# Patient Record
Sex: MALE | Race: WHITE | NOT HISPANIC OR LATINO
[De-identification: names, ages, dates, MRNs, and addresses within clinical notes are randomized per-mention and may not be internally consistent; named-entity substitution may affect disease eponyms.]

---

## 2017-01-05 ENCOUNTER — RX RENEWAL (OUTPATIENT)
Age: 72
End: 2017-01-05

## 2017-03-09 ENCOUNTER — APPOINTMENT (OUTPATIENT)
Dept: FAMILY MEDICINE | Facility: CLINIC | Age: 72
End: 2017-03-09

## 2017-03-09 VITALS
HEIGHT: 71 IN | SYSTOLIC BLOOD PRESSURE: 122 MMHG | BODY MASS INDEX: 27.23 KG/M2 | WEIGHT: 194.5 LBS | DIASTOLIC BLOOD PRESSURE: 70 MMHG | HEART RATE: 64 BPM

## 2017-03-09 VITALS — DIASTOLIC BLOOD PRESSURE: 80 MMHG | SYSTOLIC BLOOD PRESSURE: 120 MMHG | RESPIRATION RATE: 16 BRPM | HEART RATE: 72 BPM

## 2017-06-29 ENCOUNTER — APPOINTMENT (OUTPATIENT)
Dept: FAMILY MEDICINE | Facility: CLINIC | Age: 72
End: 2017-06-29

## 2017-06-29 VITALS
RESPIRATION RATE: 16 BRPM | BODY MASS INDEX: 26.2 KG/M2 | WEIGHT: 187.13 LBS | SYSTOLIC BLOOD PRESSURE: 110 MMHG | OXYGEN SATURATION: 97 % | HEIGHT: 71 IN | DIASTOLIC BLOOD PRESSURE: 66 MMHG | HEART RATE: 68 BPM

## 2017-06-29 VITALS — RESPIRATION RATE: 16 BRPM | HEART RATE: 64 BPM | DIASTOLIC BLOOD PRESSURE: 90 MMHG | SYSTOLIC BLOOD PRESSURE: 140 MMHG

## 2017-06-29 DIAGNOSIS — Z71.89 OTHER SPECIFIED COUNSELING: ICD-10-CM

## 2017-06-29 DIAGNOSIS — Z12.11 ENCOUNTER FOR SCREENING FOR MALIGNANT NEOPLASM OF COLON: ICD-10-CM

## 2017-06-29 DIAGNOSIS — J18.9 PNEUMONIA, UNSPECIFIED ORGANISM: ICD-10-CM

## 2017-06-29 RX ORDER — CEPHALEXIN 500 MG/1
500 TABLET ORAL
Qty: 14 | Refills: 0 | Status: COMPLETED | COMMUNITY
Start: 2017-04-12

## 2017-06-29 RX ORDER — AMOXICILLIN 875 MG/1
875 TABLET, FILM COATED ORAL
Qty: 20 | Refills: 0 | Status: COMPLETED | COMMUNITY
Start: 2017-03-09 | End: 2017-06-29

## 2017-07-11 ENCOUNTER — APPOINTMENT (OUTPATIENT)
Dept: ORTHOPEDIC SURGERY | Facility: CLINIC | Age: 72
End: 2017-07-11

## 2017-07-11 VITALS — HEART RATE: 68 BPM | DIASTOLIC BLOOD PRESSURE: 82 MMHG | SYSTOLIC BLOOD PRESSURE: 135 MMHG

## 2017-07-11 VITALS — WEIGHT: 187 LBS | BODY MASS INDEX: 26.18 KG/M2 | HEIGHT: 71 IN

## 2017-07-11 DIAGNOSIS — M47.24 OTHER SPONDYLOSIS WITH RADICULOPATHY, THORACIC REGION: ICD-10-CM

## 2017-07-11 DIAGNOSIS — M54.14 RADICULOPATHY, THORACIC REGION: ICD-10-CM

## 2017-07-11 DIAGNOSIS — S23.9XXA SPRAIN OF UNSPECIFIED PARTS OF THORAX, INITIAL ENCOUNTER: ICD-10-CM

## 2017-10-03 ENCOUNTER — MED ADMIN CHARGE (OUTPATIENT)
Age: 72
End: 2017-10-03

## 2017-10-03 ENCOUNTER — APPOINTMENT (OUTPATIENT)
Dept: FAMILY MEDICINE | Facility: CLINIC | Age: 72
End: 2017-10-03
Payer: MEDICARE

## 2017-10-03 PROCEDURE — 90662 IIV NO PRSV INCREASED AG IM: CPT

## 2017-10-03 PROCEDURE — G0008: CPT

## 2017-12-06 ENCOUNTER — MED ADMIN CHARGE (OUTPATIENT)
Age: 72
End: 2017-12-06

## 2017-12-06 ENCOUNTER — APPOINTMENT (OUTPATIENT)
Dept: FAMILY MEDICINE | Facility: CLINIC | Age: 72
End: 2017-12-06
Payer: MEDICARE

## 2017-12-06 PROCEDURE — 90670 PCV13 VACCINE IM: CPT

## 2017-12-06 PROCEDURE — G0009: CPT

## 2018-01-16 ENCOUNTER — LABORATORY RESULT (OUTPATIENT)
Age: 73
End: 2018-01-16

## 2018-01-31 ENCOUNTER — APPOINTMENT (OUTPATIENT)
Dept: FAMILY MEDICINE | Facility: CLINIC | Age: 73
End: 2018-01-31
Payer: MEDICARE

## 2018-01-31 VITALS
HEIGHT: 71 IN | TEMPERATURE: 97.6 F | BODY MASS INDEX: 27.21 KG/M2 | DIASTOLIC BLOOD PRESSURE: 62 MMHG | WEIGHT: 194.38 LBS | SYSTOLIC BLOOD PRESSURE: 112 MMHG

## 2018-01-31 VITALS — RESPIRATION RATE: 16 BRPM | SYSTOLIC BLOOD PRESSURE: 90 MMHG | HEART RATE: 72 BPM | DIASTOLIC BLOOD PRESSURE: 60 MMHG

## 2018-01-31 PROCEDURE — 99213 OFFICE O/P EST LOW 20 MIN: CPT

## 2018-01-31 RX ORDER — TOBRAMYCIN AND DEXAMETHASONE 3; 1 MG/ML; MG/ML
0.3-0.1 SUSPENSION/ DROPS OPHTHALMIC
Qty: 5 | Refills: 0 | Status: COMPLETED | COMMUNITY
Start: 2018-01-02

## 2018-01-31 RX ORDER — DILTIAZEM HCL 120 MG
CAPSULE, EXT RELEASE 24 HR ORAL
Refills: 0 | Status: COMPLETED | COMMUNITY

## 2018-01-31 RX ORDER — POLYMYXIN B SULFATE AND TRIMETHOPRIM 10000; 1 [USP'U]/ML; MG/ML
10000-0.1 SOLUTION OPHTHALMIC
Qty: 10 | Refills: 0 | Status: COMPLETED | COMMUNITY
Start: 2018-01-17

## 2018-01-31 RX ORDER — ERYTHROMYCIN 5 MG/G
5 OINTMENT OPHTHALMIC
Qty: 4 | Refills: 0 | Status: COMPLETED | COMMUNITY
Start: 2018-01-17

## 2018-01-31 RX ORDER — NAPROXEN 500 MG/1
500 TABLET ORAL
Qty: 60 | Refills: 1 | Status: COMPLETED | COMMUNITY
Start: 2017-03-09 | End: 2018-01-31

## 2018-01-31 RX ORDER — METOPROLOL SUCCINATE 200 MG/1
TABLET, EXTENDED RELEASE ORAL
Refills: 0 | Status: COMPLETED | COMMUNITY

## 2018-02-21 ENCOUNTER — APPOINTMENT (OUTPATIENT)
Dept: FAMILY MEDICINE | Facility: CLINIC | Age: 73
End: 2018-02-21
Payer: MEDICARE

## 2018-02-21 VITALS
DIASTOLIC BLOOD PRESSURE: 62 MMHG | WEIGHT: 194.13 LBS | BODY MASS INDEX: 27.18 KG/M2 | HEIGHT: 71 IN | SYSTOLIC BLOOD PRESSURE: 126 MMHG

## 2018-02-21 VITALS — RESPIRATION RATE: 16 BRPM | DIASTOLIC BLOOD PRESSURE: 74 MMHG | SYSTOLIC BLOOD PRESSURE: 120 MMHG | HEART RATE: 72 BPM

## 2018-02-21 DIAGNOSIS — J06.9 ACUTE UPPER RESPIRATORY INFECTION, UNSPECIFIED: ICD-10-CM

## 2018-02-21 DIAGNOSIS — Q38.3 OTHER CONGENITAL MALFORMATIONS OF TONGUE: ICD-10-CM

## 2018-02-21 PROCEDURE — 99214 OFFICE O/P EST MOD 30 MIN: CPT

## 2018-02-21 RX ORDER — AMOXICILLIN 250 MG/1
250 CAPSULE ORAL
Qty: 28 | Refills: 0 | Status: COMPLETED | COMMUNITY
Start: 2018-02-20 | End: 2018-02-21

## 2018-02-21 RX ORDER — METHYLPREDNISOLONE 4 MG/1
4 TABLET ORAL
Qty: 21 | Refills: 0 | Status: COMPLETED | COMMUNITY
Start: 2018-02-20

## 2018-02-21 RX ORDER — SOD CHLOR,BICARB/SQUEEZ BOTTLE
PACKET, WITH RINSE DEVICE NASAL
Qty: 1 | Refills: 0 | Status: COMPLETED | COMMUNITY
Start: 2018-01-31 | End: 2018-02-21

## 2018-03-17 ENCOUNTER — APPOINTMENT (OUTPATIENT)
Dept: FAMILY MEDICINE | Facility: CLINIC | Age: 73
End: 2018-03-17
Payer: MEDICARE

## 2018-03-17 VITALS
WEIGHT: 194 LBS | HEIGHT: 71 IN | DIASTOLIC BLOOD PRESSURE: 70 MMHG | HEART RATE: 96 BPM | OXYGEN SATURATION: 96 % | TEMPERATURE: 99 F | BODY MASS INDEX: 27.16 KG/M2 | SYSTOLIC BLOOD PRESSURE: 110 MMHG

## 2018-03-17 VITALS — RESPIRATION RATE: 16 BRPM | HEART RATE: 76 BPM | SYSTOLIC BLOOD PRESSURE: 110 MMHG | DIASTOLIC BLOOD PRESSURE: 70 MMHG

## 2018-03-17 LAB
BILIRUB UR QL STRIP: NEGATIVE
CLARITY UR: CLEAR
COLLECTION METHOD: NORMAL
GLUCOSE UR-MCNC: NEGATIVE
HCG UR QL: 0.2 EU/DL
HGB UR QL STRIP.AUTO: NORMAL
KETONES UR-MCNC: NEGATIVE
LEUKOCYTE ESTERASE UR QL STRIP: NORMAL
NITRITE UR QL STRIP: NEGATIVE
PH UR STRIP: 6
PROT UR STRIP-MCNC: 100
SP GR UR STRIP: 1.02

## 2018-03-17 PROCEDURE — 99214 OFFICE O/P EST MOD 30 MIN: CPT | Mod: 25

## 2018-03-17 PROCEDURE — 81003 URINALYSIS AUTO W/O SCOPE: CPT | Mod: QW

## 2018-10-11 ENCOUNTER — APPOINTMENT (OUTPATIENT)
Dept: FAMILY MEDICINE | Facility: CLINIC | Age: 73
End: 2018-10-11
Payer: MEDICARE

## 2018-10-11 VITALS
RESPIRATION RATE: 16 BRPM | HEART RATE: 68 BPM | WEIGHT: 194 LBS | OXYGEN SATURATION: 96 % | HEIGHT: 71 IN | BODY MASS INDEX: 27.16 KG/M2 | DIASTOLIC BLOOD PRESSURE: 60 MMHG | SYSTOLIC BLOOD PRESSURE: 90 MMHG | TEMPERATURE: 98.4 F

## 2018-10-11 DIAGNOSIS — R82.71 BACTERIURIA: ICD-10-CM

## 2018-10-11 LAB
BILIRUB UR QL STRIP: NEGATIVE
GLUCOSE UR-MCNC: NEGATIVE
HCG UR QL: 0.2 EU/DL
HGB UR QL STRIP.AUTO: NORMAL
KETONES UR-MCNC: NEGATIVE
LEUKOCYTE ESTERASE UR QL STRIP: NORMAL
NITRITE UR QL STRIP: NEGATIVE
PH UR STRIP: 6
PROT UR STRIP-MCNC: NEGATIVE
SP GR UR STRIP: 1.02

## 2018-10-11 PROCEDURE — 81003 URINALYSIS AUTO W/O SCOPE: CPT | Mod: QW

## 2018-10-11 PROCEDURE — 99214 OFFICE O/P EST MOD 30 MIN: CPT

## 2018-10-11 RX ORDER — MOMETASONE FUROATE 1 MG/G
0.1 CREAM TOPICAL
Qty: 15 | Refills: 0 | Status: COMPLETED | COMMUNITY
Start: 2018-06-21

## 2018-10-11 RX ORDER — DESONIDE 0.5 MG/G
0.05 CREAM TOPICAL
Qty: 30 | Refills: 0 | Status: COMPLETED | COMMUNITY
Start: 2018-09-28

## 2018-10-11 RX ORDER — CEPHALEXIN 500 MG/1
500 CAPSULE ORAL
Qty: 20 | Refills: 0 | Status: COMPLETED | COMMUNITY
Start: 2018-10-03

## 2018-10-11 NOTE — HISTORY OF PRESENT ILLNESS
[FreeTextEntry8] : recurrent  uti  \par \par has  seen multiple  md  for recurrent  utihas  had  multiple  opinions has  had  bacteriuria  but not having  any sx  of  dysuria going  for  ct  scan of abd and pelvis

## 2018-10-23 ENCOUNTER — APPOINTMENT (OUTPATIENT)
Dept: FAMILY MEDICINE | Facility: CLINIC | Age: 73
End: 2018-10-23

## 2018-10-25 ENCOUNTER — APPOINTMENT (OUTPATIENT)
Dept: FAMILY MEDICINE | Facility: CLINIC | Age: 73
End: 2018-10-25
Payer: MEDICARE

## 2018-10-25 VITALS — TEMPERATURE: 98.2 F

## 2018-10-25 DIAGNOSIS — Z23 ENCOUNTER FOR IMMUNIZATION: ICD-10-CM

## 2018-10-25 PROCEDURE — 90662 IIV NO PRSV INCREASED AG IM: CPT

## 2018-10-25 PROCEDURE — G0008: CPT

## 2018-10-31 ENCOUNTER — APPOINTMENT (OUTPATIENT)
Dept: FAMILY MEDICINE | Facility: CLINIC | Age: 73
End: 2018-10-31

## 2019-02-11 ENCOUNTER — NON-APPOINTMENT (OUTPATIENT)
Age: 74
End: 2019-02-11

## 2019-02-11 ENCOUNTER — APPOINTMENT (OUTPATIENT)
Dept: FAMILY MEDICINE | Facility: CLINIC | Age: 74
End: 2019-02-11
Payer: MEDICARE

## 2019-02-11 VITALS
OXYGEN SATURATION: 97 % | SYSTOLIC BLOOD PRESSURE: 136 MMHG | HEART RATE: 65 BPM | RESPIRATION RATE: 16 BRPM | WEIGHT: 193 LBS | BODY MASS INDEX: 27.02 KG/M2 | TEMPERATURE: 97.7 F | HEIGHT: 71 IN | DIASTOLIC BLOOD PRESSURE: 86 MMHG

## 2019-02-11 VITALS — HEART RATE: 72 BPM | DIASTOLIC BLOOD PRESSURE: 90 MMHG | SYSTOLIC BLOOD PRESSURE: 140 MMHG | RESPIRATION RATE: 16 BRPM

## 2019-02-11 PROCEDURE — 93000 ELECTROCARDIOGRAM COMPLETE: CPT

## 2019-02-11 PROCEDURE — 99214 OFFICE O/P EST MOD 30 MIN: CPT | Mod: 25

## 2019-02-11 RX ORDER — APIXABAN 5 MG/1
5 TABLET, FILM COATED ORAL
Qty: 4 | Refills: 0 | Status: COMPLETED | COMMUNITY
Start: 2018-05-23 | End: 2019-02-11

## 2019-02-11 RX ORDER — NAPROXEN 500 MG/1
500 TABLET ORAL
Qty: 60 | Refills: 1 | Status: COMPLETED | COMMUNITY
Start: 2017-07-11 | End: 2019-02-11

## 2019-02-11 RX ORDER — LEVOFLOXACIN 500 MG/1
500 TABLET, FILM COATED ORAL DAILY
Qty: 10 | Refills: 0 | Status: COMPLETED | COMMUNITY
Start: 2018-03-17 | End: 2019-02-11

## 2019-02-11 NOTE — ASSESSMENT
[High Risk Surgery - Intraperitoneal, Intrathoracic or Supringuinal Vascular Procedures] : High Risk Surgery - Intraperitoneal, Intrathoracic or Supringuinal Vascular Procedures - No (0) [Ischemic Heart Disease] : Ischemic Heart Disease - No (0) [Congestive Heart Failure] : Congestive Heart Failure - No (0) [Prior Cerebrovascular Accident or TIA] : Prior Cerebrovascular Accident or TIA - No (0) [Creatinine >= 2mg/dL (1 Point)] : Creatinine >= 2mg/dL - No (0) [Insulin-dependent Diabetic (1 Point)] : Insulin-dependent Diabetic - No (0) [0] : 0 , RCRI Class: I, Risk of Post-Op Cardiac Complications: 0.4%, Procedure Risk: Low-Risk [Patient Optimized for Surgery] : Patient optimized for surgery [No Further Testing Recommended] : no further testing recommended

## 2019-02-11 NOTE — HISTORY OF PRESENT ILLNESS
[No Pertinent Cardiac History] : no history of aortic stenosis, atrial fibrillation, coronary artery disease, recent myocardial infarction, or implantable device/pacemaker [No Pertinent Pulmonary History] : no history of asthma, COPD, sleep apnea, or smoking [No Adverse Anesthesia Reaction] : no adverse anesthesia reaction in self or family member [(Patient denies any chest pain, claudication, dyspnea on exertion, orthopnea, palpitations or syncope)] : Patient denies any chest pain, claudication, dyspnea on exertion, orthopnea, palpitations or syncope [Moderate (4-6 METs)] : Moderate (4-6 METs) [Chronic Anticoagulation] : no chronic anticoagulation [Chronic Kidney Disease] : no chronic kidney disease [Diabetes] : no diabetes [FreeTextEntry1] : Cataract  [FreeTextEntry2] : 2//25/19  [FreeTextEntry3] : Dr. Edouard [FreeTextEntry4] : pt is a 73 year old  male here for cataract extraction

## 2019-02-11 NOTE — REVIEW OF SYSTEMS
[Fever] : no fever [Chills] : no chills [Discharge] : no discharge [Vision Problems] : vision problems [Sore Throat] : no sore throat [Chest Pain] : no chest pain [Palpitations] : no palpitations [Shortness Of Breath] : no shortness of breath [Cough] : no cough [Abdominal Pain] : no abdominal pain [Constipation] : constipation [Diarrhea] : no diarrhea [Dysuria] : no dysuria [Skin Rash] : no skin rash [Dizziness] : no dizziness [Fainting] : no fainting [Swollen Glands] : no swollen glands

## 2019-02-11 NOTE — CONSULT LETTER
[Dear  ___] : Dear  [unfilled], [( Thank you for referring [unfilled] for consultation for _____ )] : Thank you for referring [unfilled] for consultation for [unfilled] [Consult Closing:] : Thank you very much for allowing me to participate in the care of this patient.  If you have any questions, please do not hesitate to contact me.

## 2019-03-06 ENCOUNTER — APPOINTMENT (OUTPATIENT)
Dept: FAMILY MEDICINE | Facility: CLINIC | Age: 74
End: 2019-03-06
Payer: MEDICARE

## 2019-03-06 VITALS
RESPIRATION RATE: 16 BRPM | OXYGEN SATURATION: 98 % | SYSTOLIC BLOOD PRESSURE: 124 MMHG | DIASTOLIC BLOOD PRESSURE: 70 MMHG | HEART RATE: 59 BPM | TEMPERATURE: 97.5 F | HEIGHT: 71 IN | WEIGHT: 190 LBS | BODY MASS INDEX: 26.6 KG/M2

## 2019-03-06 VITALS — HEART RATE: 72 BPM | RESPIRATION RATE: 16 BRPM | DIASTOLIC BLOOD PRESSURE: 90 MMHG | SYSTOLIC BLOOD PRESSURE: 140 MMHG

## 2019-03-06 DIAGNOSIS — H26.9 UNSPECIFIED CATARACT: ICD-10-CM

## 2019-03-06 PROCEDURE — 99214 OFFICE O/P EST MOD 30 MIN: CPT

## 2019-03-06 RX ORDER — CIPROFLOXACIN HYDROCHLORIDE 500 MG/1
500 TABLET, FILM COATED ORAL TWICE DAILY
Qty: 20 | Refills: 0 | Status: COMPLETED | COMMUNITY
Start: 2019-02-11 | End: 2019-03-06

## 2019-03-06 NOTE — HISTORY OF PRESENT ILLNESS
[Atrial Fibrillation] : atrial fibrillation [No Pertinent Pulmonary History] : no history of asthma, COPD, sleep apnea, or smoking [No Adverse Anesthesia Reaction] : no adverse anesthesia reaction in self or family member [(Patient denies any chest pain, claudication, dyspnea on exertion, orthopnea, palpitations or syncope)] : Patient denies any chest pain, claudication, dyspnea on exertion, orthopnea, palpitations or syncope [Moderate (4-6 METs)] : Moderate (4-6 METs) [Chronic Anticoagulation] : no chronic anticoagulation [Chronic Kidney Disease] : no chronic kidney disease [Diabetes] : no diabetes [FreeTextEntry1] : cataract OD [FreeTextEntry2] : 3/19/19 [FreeTextEntry3] : Dr. Edouard  [FreeTextEntry4] : pt here  for  clearance for cataract  surgery  active  medical problems  include AF AND HLD

## 2019-03-06 NOTE — ASSESSMENT
[High Risk Surgery - Intraperitoneal, Intrathoracic or Supringuinal Vascular Procedures] : High Risk Surgery - Intraperitoneal, Intrathoracic or Supringuinal Vascular Procedures - No (0) [Ischemic Heart Disease] : Ischemic Heart Disease - No (0) [Congestive Heart Failure] : Congestive Heart Failure - No (0) [Prior Cerebrovascular Accident or TIA] : Prior Cerebrovascular Accident or TIA - No (0) [Creatinine >= 2mg/dL (1 Point)] : Creatinine >= 2mg/dL - No (0) [Insulin-dependent Diabetic (1 Point)] : Insulin-dependent Diabetic - No (0) [0] : 0 , RCRI Class: I, Risk of Post-Op Cardiac Complications: 0.4%, Procedure Risk: Low-Risk [Patient Optimized for Surgery] : Patient optimized for surgery [FreeTextEntry4] : acceptable medical condition for surgery\par

## 2019-03-06 NOTE — REVIEW OF SYSTEMS
[Vision Problems] : vision problems [Constipation] : constipation [Fever] : no fever [Chills] : no chills [Discharge] : no discharge [Sore Throat] : no sore throat [Chest Pain] : no chest pain [Palpitations] : no palpitations [Shortness Of Breath] : no shortness of breath [Cough] : no cough [Abdominal Pain] : no abdominal pain [Diarrhea] : no diarrhea [Dysuria] : no dysuria [Skin Rash] : no skin rash [Dizziness] : no dizziness [Fainting] : no fainting [Swollen Glands] : no swollen glands

## 2019-03-09 ENCOUNTER — RX RENEWAL (OUTPATIENT)
Age: 74
End: 2019-03-09

## 2019-03-15 ENCOUNTER — APPOINTMENT (OUTPATIENT)
Dept: ORTHOPEDIC SURGERY | Facility: CLINIC | Age: 74
End: 2019-03-15

## 2019-08-23 ENCOUNTER — RX RENEWAL (OUTPATIENT)
Age: 74
End: 2019-08-23

## 2019-09-19 ENCOUNTER — APPOINTMENT (OUTPATIENT)
Dept: UROLOGY | Facility: CLINIC | Age: 74
End: 2019-09-19
Payer: MEDICARE

## 2019-09-19 PROCEDURE — 99204 OFFICE O/P NEW MOD 45 MIN: CPT | Mod: 25

## 2019-09-19 PROCEDURE — 51798 US URINE CAPACITY MEASURE: CPT

## 2019-09-20 LAB
APPEARANCE: CLEAR
BACTERIA: ABNORMAL
BILIRUBIN URINE: NEGATIVE
BLOOD URINE: ABNORMAL
COLOR: NORMAL
GLUCOSE QUALITATIVE U: NEGATIVE
HYALINE CASTS: 1 /LPF
KETONES URINE: NEGATIVE
LEUKOCYTE ESTERASE URINE: NEGATIVE
MICROSCOPIC-UA: NORMAL
NITRITE URINE: NEGATIVE
PH URINE: 7.5
PROTEIN URINE: ABNORMAL
PSA FREE FLD-MCNC: NORMAL %
PSA FREE SERPL-MCNC: <0.01 NG/ML
PSA SERPL-MCNC: <0.01 NG/ML
RED BLOOD CELLS URINE: 10 /HPF
SPECIFIC GRAVITY URINE: 1.01
SQUAMOUS EPITHELIAL CELLS: 1 /HPF
UROBILINOGEN URINE: NORMAL
WHITE BLOOD CELLS URINE: 16 /HPF

## 2019-10-03 ENCOUNTER — APPOINTMENT (OUTPATIENT)
Dept: FAMILY MEDICINE | Facility: CLINIC | Age: 74
End: 2019-10-03
Payer: MEDICARE

## 2019-10-03 VITALS
OXYGEN SATURATION: 97 % | WEIGHT: 186.13 LBS | TEMPERATURE: 98 F | HEIGHT: 71 IN | SYSTOLIC BLOOD PRESSURE: 128 MMHG | BODY MASS INDEX: 26.06 KG/M2 | DIASTOLIC BLOOD PRESSURE: 80 MMHG | HEART RATE: 68 BPM

## 2019-10-03 VITALS — SYSTOLIC BLOOD PRESSURE: 110 MMHG | DIASTOLIC BLOOD PRESSURE: 70 MMHG | HEART RATE: 72 BPM | RESPIRATION RATE: 16 BRPM

## 2019-10-03 PROCEDURE — G0008: CPT

## 2019-10-03 PROCEDURE — G0444 DEPRESSION SCREEN ANNUAL: CPT | Mod: 59

## 2019-10-03 PROCEDURE — 99214 OFFICE O/P EST MOD 30 MIN: CPT | Mod: 25

## 2019-10-03 PROCEDURE — 90662 IIV NO PRSV INCREASED AG IM: CPT

## 2019-10-03 NOTE — HISTORY OF PRESENT ILLNESS
[Hyperlipidemia] : Hyperlipidemia [Other: ___] : [unfilled] [Mild] : mild [Congestion] : congestion [Cough] : cough [Improving] : improving [Doing Well] : Patient is doing well [Moderate Intensity Therapy] : Patient is currently on moderate intensity statin  therapy [Sore Throat] : no sore throat [Fever] : no fever [FreeTextEntry6] : had  episode of  AF  1 WK AGO LASTED SEVERAL  HRS  SAW  CARDIOLOGY AND  HAD  CARDIOVERSION NOW ON ELIQUES  FOR  4 WKS

## 2019-10-03 NOTE — REVIEW OF SYSTEMS
[Palpitations] : palpitations [Anxiety] : anxiety [Fever] : no fever [Fatigue] : no fatigue [de-identified] : WORRIED  ABOUT  SON  WITH CANCER

## 2019-10-03 NOTE — PHYSICAL EXAM
[No Acute Distress] : no acute distress [PERRL] : pupils equal round and reactive to light [Normal Oropharynx] : the oropharynx was normal [No Lymphadenopathy] : no lymphadenopathy [No Accessory Muscle Use] : no accessory muscle use [Regular Rhythm] : with a regular rhythm [No Edema] : there was no peripheral edema [No Rash] : no rash [Normal] : normal gait, coordination grossly intact, no focal deficits and deep tendon reflexes were 2+ and symmetric [Normal Insight/Judgement] : insight and judgment were intact

## 2020-02-04 LAB
ALBUMIN SERPL ELPH-MCNC: 4.3 G/DL
ALP BLD-CCNC: 80 U/L
ALT SERPL-CCNC: 40 U/L
ANION GAP SERPL CALC-SCNC: 10 MMOL/L
AST SERPL-CCNC: 25 U/L
BASOPHILS # BLD AUTO: 0.07 K/UL
BASOPHILS NFR BLD AUTO: 1.5 %
BILIRUB SERPL-MCNC: 0.4 MG/DL
BUN SERPL-MCNC: 27 MG/DL
CALCIUM SERPL-MCNC: 10.1 MG/DL
CHLORIDE SERPL-SCNC: 106 MMOL/L
CHOLEST SERPL-MCNC: 180 MG/DL
CHOLEST/HDLC SERPL: 4.1 RATIO
CO2 SERPL-SCNC: 26 MMOL/L
CREAT SERPL-MCNC: 1.32 MG/DL
EOSINOPHIL # BLD AUTO: 0.13 K/UL
EOSINOPHIL NFR BLD AUTO: 2.8 %
GLUCOSE SERPL-MCNC: 104 MG/DL
HCT VFR BLD CALC: 51 %
HDLC SERPL-MCNC: 43 MG/DL
HGB BLD-MCNC: 16.6 G/DL
IMM GRANULOCYTES NFR BLD AUTO: 0.2 %
LDLC SERPL CALC-MCNC: 116 MG/DL
LYMPHOCYTES # BLD AUTO: 1.01 K/UL
LYMPHOCYTES NFR BLD AUTO: 21.9 %
MAN DIFF?: NORMAL
MCHC RBC-ENTMCNC: 31.6 PG
MCHC RBC-ENTMCNC: 32.5 GM/DL
MCV RBC AUTO: 97.1 FL
MONOCYTES # BLD AUTO: 0.6 K/UL
MONOCYTES NFR BLD AUTO: 13 %
NEUTROPHILS # BLD AUTO: 2.79 K/UL
NEUTROPHILS NFR BLD AUTO: 60.6 %
PLATELET # BLD AUTO: 278 K/UL
POTASSIUM SERPL-SCNC: 4.7 MMOL/L
PROT SERPL-MCNC: 6.6 G/DL
RBC # BLD: 5.25 M/UL
RBC # FLD: 14.1 %
SODIUM SERPL-SCNC: 142 MMOL/L
T4 SERPL-MCNC: 6.2 UG/DL
TRIGL SERPL-MCNC: 98 MG/DL
TSH SERPL-ACNC: 1.45 UIU/ML
URATE SERPL-MCNC: 6.4 MG/DL
WBC # FLD AUTO: 4.61 K/UL

## 2020-02-10 ENCOUNTER — NON-APPOINTMENT (OUTPATIENT)
Age: 75
End: 2020-02-10

## 2020-02-10 ENCOUNTER — APPOINTMENT (OUTPATIENT)
Dept: FAMILY MEDICINE | Facility: CLINIC | Age: 75
End: 2020-02-10
Payer: MEDICARE

## 2020-02-10 VITALS
OXYGEN SATURATION: 98 % | RESPIRATION RATE: 16 BRPM | WEIGHT: 182 LBS | SYSTOLIC BLOOD PRESSURE: 134 MMHG | BODY MASS INDEX: 25.48 KG/M2 | HEIGHT: 71 IN | DIASTOLIC BLOOD PRESSURE: 80 MMHG | HEART RATE: 62 BPM

## 2020-02-10 VITALS — SYSTOLIC BLOOD PRESSURE: 120 MMHG | HEART RATE: 60 BPM | RESPIRATION RATE: 16 BRPM | DIASTOLIC BLOOD PRESSURE: 70 MMHG

## 2020-02-10 PROCEDURE — 93000 ELECTROCARDIOGRAM COMPLETE: CPT | Mod: 59

## 2020-02-10 PROCEDURE — G0439: CPT

## 2020-02-10 PROCEDURE — 99213 OFFICE O/P EST LOW 20 MIN: CPT | Mod: 25

## 2020-02-10 NOTE — HISTORY OF PRESENT ILLNESS
[FreeTextEntry1] : pt  here for cpe and management of HLD AF  [de-identified] : PT  SON  KIT RECOVERING FROM TESTICULAR  CANCER

## 2020-02-10 NOTE — PHYSICAL EXAM
[No Acute Distress] : no acute distress [Well Nourished] : well nourished [Well Developed] : well developed [Normal Sclera/Conjunctiva] : normal sclera/conjunctiva [Well-Appearing] : well-appearing [PERRL] : pupils equal round and reactive to light [EOMI] : extraocular movements intact [Normal Outer Ear/Nose] : the outer ears and nose were normal in appearance [No JVD] : no jugular venous distention [Normal Oropharynx] : the oropharynx was normal [No Lymphadenopathy] : no lymphadenopathy [Supple] : supple [Thyroid Normal, No Nodules] : the thyroid was normal and there were no nodules present [No Respiratory Distress] : no respiratory distress  [No Accessory Muscle Use] : no accessory muscle use [Clear to Auscultation] : lungs were clear to auscultation bilaterally [Regular Rhythm] : with a regular rhythm [Normal Rate] : normal rate  [Normal S1, S2] : normal S1 and S2 [No Carotid Bruits] : no carotid bruits [No Murmur] : no murmur heard [No Abdominal Bruit] : a ~M bruit was not heard ~T in the abdomen [No Varicosities] : no varicosities [Pedal Pulses Present] : the pedal pulses are present [No Edema] : there was no peripheral edema [No Palpable Aorta] : no palpable aorta [No Extremity Clubbing/Cyanosis] : no extremity clubbing/cyanosis [Non Tender] : non-tender [Soft] : abdomen soft [No HSM] : no HSM [Non-distended] : non-distended [No Masses] : no abdominal mass palpated [Normal Bowel Sounds] : normal bowel sounds [Normal Sphincter Tone] : normal sphincter tone [No Mass] : no mass [Urethral Meatus] : meatus normal [Urinary Bladder Findings] : the bladder was normal on palpation [Scrotum] : the scrotum was normal [Testes Mass (___cm)] : there were no testicular masses [Normal Posterior Cervical Nodes] : no posterior cervical lymphadenopathy [No CVA Tenderness] : no CVA  tenderness [Normal Anterior Cervical Nodes] : no anterior cervical lymphadenopathy [No Joint Swelling] : no joint swelling [No Spinal Tenderness] : no spinal tenderness [No Rash] : no rash [Grossly Normal Strength/Tone] : grossly normal strength/tone [Coordination Grossly Intact] : coordination grossly intact [No Focal Deficits] : no focal deficits [Normal Gait] : normal gait [Deep Tendon Reflexes (DTR)] : deep tendon reflexes were 2+ and symmetric [Normal Insight/Judgement] : insight and judgment were intact [Normal Affect] : the affect was normal

## 2020-02-10 NOTE — HEALTH RISK ASSESSMENT
[Very Good] : ~his/her~  mood as very good [Yes] : Yes [2 - 4 times a month (2 pts)] : 2-4 times a month (2 points) [No falls in past year] : Patient reported no falls in the past year [0] : 2) Feeling down, depressed, or hopeless: Not at all (0) [With Family] : lives with family [Retired] : retired [College] : College [] :  [# Of Children ___] : has [unfilled] children [Smoke Detector] : smoke detector [Carbon Monoxide Detector] : carbon monoxide detector [Seat Belt] :  uses seat belt [] : No [Reports changes in hearing] : Reports no changes in hearing [Reports changes in vision] : Reports no changes in vision [Change in mental status noted] : No change in mental status noted [Reports changes in dental health] : Reports no changes in dental health [ColonoscopyDate] : 5/14 [de-identified] : PARTHA  [de-identified] : [facility]  SURGERY  [AdvancecareDate] : 2/20

## 2020-04-14 ENCOUNTER — RX RENEWAL (OUTPATIENT)
Age: 75
End: 2020-04-14

## 2020-05-24 DIAGNOSIS — Z00.00 ENCOUNTER FOR GENERAL ADULT MEDICAL EXAMINATION W/OUT ABNORMAL FINDINGS: ICD-10-CM

## 2020-05-28 ENCOUNTER — EMERGENCY (EMERGENCY)
Facility: HOSPITAL | Age: 75
LOS: 1 days | Discharge: HOME | End: 2020-05-28
Attending: EMERGENCY MEDICINE
Payer: MEDICARE

## 2020-05-28 VITALS
DIASTOLIC BLOOD PRESSURE: 82 MMHG | HEART RATE: 63 BPM | WEIGHT: 182.1 LBS | TEMPERATURE: 98 F | HEIGHT: 71 IN | SYSTOLIC BLOOD PRESSURE: 148 MMHG | OXYGEN SATURATION: 99 % | RESPIRATION RATE: 20 BRPM

## 2020-05-28 LAB
ALBUMIN SERPL ELPH-MCNC: 3.7 G/DL — SIGNIFICANT CHANGE UP (ref 3.3–5)
ALBUMIN SERPL ELPH-MCNC: 3.9 G/DL — SIGNIFICANT CHANGE UP (ref 3.3–5)
ALP SERPL-CCNC: 69 U/L — SIGNIFICANT CHANGE UP (ref 40–120)
ALP SERPL-CCNC: 76 U/L — SIGNIFICANT CHANGE UP (ref 40–120)
ALT FLD-CCNC: 26 U/L — SIGNIFICANT CHANGE UP (ref 10–45)
ALT FLD-CCNC: 27 U/L — SIGNIFICANT CHANGE UP (ref 10–45)
ANION GAP SERPL CALC-SCNC: 9 MMOL/L — SIGNIFICANT CHANGE UP (ref 5–17)
ANION GAP SERPL CALC-SCNC: 9 MMOL/L — SIGNIFICANT CHANGE UP (ref 5–17)
APPEARANCE UR: CLEAR — SIGNIFICANT CHANGE UP
APTT BLD: 27.6 SEC — SIGNIFICANT CHANGE UP (ref 27.5–36.3)
AST SERPL-CCNC: 20 U/L — SIGNIFICANT CHANGE UP (ref 10–40)
AST SERPL-CCNC: 23 U/L — SIGNIFICANT CHANGE UP (ref 10–40)
BACTERIA # UR AUTO: ABNORMAL
BASE EXCESS BLDV CALC-SCNC: 1.3 MMOL/L — SIGNIFICANT CHANGE UP (ref -2–2)
BASOPHILS # BLD AUTO: 0.04 K/UL — SIGNIFICANT CHANGE UP (ref 0–0.2)
BASOPHILS # BLD AUTO: 0.07 K/UL — SIGNIFICANT CHANGE UP (ref 0–0.2)
BASOPHILS NFR BLD AUTO: 0.3 % — SIGNIFICANT CHANGE UP (ref 0–2)
BASOPHILS NFR BLD AUTO: 0.6 % — SIGNIFICANT CHANGE UP (ref 0–2)
BILIRUB SERPL-MCNC: 0.4 MG/DL — SIGNIFICANT CHANGE UP (ref 0.2–1.2)
BILIRUB SERPL-MCNC: 0.4 MG/DL — SIGNIFICANT CHANGE UP (ref 0.2–1.2)
BILIRUB UR-MCNC: NEGATIVE — SIGNIFICANT CHANGE UP
BUN SERPL-MCNC: 21 MG/DL — SIGNIFICANT CHANGE UP (ref 7–23)
BUN SERPL-MCNC: 26 MG/DL — HIGH (ref 7–23)
CA-I SERPL-SCNC: 1.33 MMOL/L — HIGH (ref 1.12–1.3)
CALCIUM SERPL-MCNC: 10 MG/DL — SIGNIFICANT CHANGE UP (ref 8.4–10.5)
CALCIUM SERPL-MCNC: 9.9 MG/DL — SIGNIFICANT CHANGE UP (ref 8.4–10.5)
CHLORIDE BLDV-SCNC: 107 MMOL/L — SIGNIFICANT CHANGE UP (ref 96–108)
CHLORIDE SERPL-SCNC: 108 MMOL/L — SIGNIFICANT CHANGE UP (ref 96–108)
CHLORIDE SERPL-SCNC: 108 MMOL/L — SIGNIFICANT CHANGE UP (ref 96–108)
CO2 BLDV-SCNC: 29 MMOL/L — SIGNIFICANT CHANGE UP (ref 22–30)
CO2 SERPL-SCNC: 22 MMOL/L — SIGNIFICANT CHANGE UP (ref 22–31)
CO2 SERPL-SCNC: 24 MMOL/L — SIGNIFICANT CHANGE UP (ref 22–31)
COLOR SPEC: SIGNIFICANT CHANGE UP
CREAT SERPL-MCNC: 1.17 MG/DL — SIGNIFICANT CHANGE UP (ref 0.5–1.3)
CREAT SERPL-MCNC: 1.17 MG/DL — SIGNIFICANT CHANGE UP (ref 0.5–1.3)
DIFF PNL FLD: ABNORMAL
EOSINOPHIL # BLD AUTO: 0.02 K/UL — SIGNIFICANT CHANGE UP (ref 0–0.5)
EOSINOPHIL # BLD AUTO: 0.08 K/UL — SIGNIFICANT CHANGE UP (ref 0–0.5)
EOSINOPHIL NFR BLD AUTO: 0.2 % — SIGNIFICANT CHANGE UP (ref 0–6)
EOSINOPHIL NFR BLD AUTO: 0.6 % — SIGNIFICANT CHANGE UP (ref 0–6)
EPI CELLS # UR: 0 /HPF — SIGNIFICANT CHANGE UP
GAS PNL BLDV: 140 MMOL/L — SIGNIFICANT CHANGE UP (ref 135–145)
GAS PNL BLDV: SIGNIFICANT CHANGE UP
GAS PNL BLDV: SIGNIFICANT CHANGE UP
GLUCOSE BLDV-MCNC: 97 MG/DL — SIGNIFICANT CHANGE UP (ref 70–99)
GLUCOSE SERPL-MCNC: 103 MG/DL — HIGH (ref 70–99)
GLUCOSE SERPL-MCNC: 126 MG/DL — HIGH (ref 70–99)
GLUCOSE UR QL: NEGATIVE — SIGNIFICANT CHANGE UP
HCO3 BLDV-SCNC: 27 MMOL/L — SIGNIFICANT CHANGE UP (ref 21–29)
HCT VFR BLD CALC: 45.8 % — SIGNIFICANT CHANGE UP (ref 39–50)
HCT VFR BLD CALC: 48 % — SIGNIFICANT CHANGE UP (ref 39–50)
HCT VFR BLDA CALC: 48 % — SIGNIFICANT CHANGE UP (ref 39–50)
HGB BLD CALC-MCNC: 15.5 G/DL — SIGNIFICANT CHANGE UP (ref 13–17)
HGB BLD-MCNC: 14.7 G/DL — SIGNIFICANT CHANGE UP (ref 13–17)
HGB BLD-MCNC: 15.6 G/DL — SIGNIFICANT CHANGE UP (ref 13–17)
HYALINE CASTS # UR AUTO: 2 /LPF — SIGNIFICANT CHANGE UP (ref 0–2)
IMM GRANULOCYTES NFR BLD AUTO: 0.4 % — SIGNIFICANT CHANGE UP (ref 0–1.5)
IMM GRANULOCYTES NFR BLD AUTO: 0.5 % — SIGNIFICANT CHANGE UP (ref 0–1.5)
INR BLD: 0.93 RATIO — SIGNIFICANT CHANGE UP (ref 0.88–1.16)
KETONES UR-MCNC: NEGATIVE — SIGNIFICANT CHANGE UP
LACTATE BLDV-MCNC: 1.6 MMOL/L — SIGNIFICANT CHANGE UP (ref 0.7–2)
LEUKOCYTE ESTERASE UR-ACNC: ABNORMAL
LIDOCAIN IGE QN: 23 U/L — SIGNIFICANT CHANGE UP (ref 7–60)
LYMPHOCYTES # BLD AUTO: 0.78 K/UL — LOW (ref 1–3.3)
LYMPHOCYTES # BLD AUTO: 0.97 K/UL — LOW (ref 1–3.3)
LYMPHOCYTES # BLD AUTO: 6.2 % — LOW (ref 13–44)
LYMPHOCYTES # BLD AUTO: 8.1 % — LOW (ref 13–44)
MCHC RBC-ENTMCNC: 30.9 PG — SIGNIFICANT CHANGE UP (ref 27–34)
MCHC RBC-ENTMCNC: 31.1 PG — SIGNIFICANT CHANGE UP (ref 27–34)
MCHC RBC-ENTMCNC: 32.1 GM/DL — SIGNIFICANT CHANGE UP (ref 32–36)
MCHC RBC-ENTMCNC: 32.5 GM/DL — SIGNIFICANT CHANGE UP (ref 32–36)
MCV RBC AUTO: 95.6 FL — SIGNIFICANT CHANGE UP (ref 80–100)
MCV RBC AUTO: 96.4 FL — SIGNIFICANT CHANGE UP (ref 80–100)
MONOCYTES # BLD AUTO: 0.9 K/UL — SIGNIFICANT CHANGE UP (ref 0–0.9)
MONOCYTES # BLD AUTO: 1.44 K/UL — HIGH (ref 0–0.9)
MONOCYTES NFR BLD AUTO: 12 % — SIGNIFICANT CHANGE UP (ref 2–14)
MONOCYTES NFR BLD AUTO: 7.2 % — SIGNIFICANT CHANGE UP (ref 2–14)
NEUTROPHILS # BLD AUTO: 10.65 K/UL — HIGH (ref 1.8–7.4)
NEUTROPHILS # BLD AUTO: 9.45 K/UL — HIGH (ref 1.8–7.4)
NEUTROPHILS NFR BLD AUTO: 78.9 % — HIGH (ref 43–77)
NEUTROPHILS NFR BLD AUTO: 85 % — HIGH (ref 43–77)
NITRITE UR-MCNC: POSITIVE
NRBC # BLD: 0 /100 WBCS — SIGNIFICANT CHANGE UP (ref 0–0)
NRBC # BLD: 0 /100 WBCS — SIGNIFICANT CHANGE UP (ref 0–0)
PCO2 BLDV: 50 MMHG — SIGNIFICANT CHANGE UP (ref 35–50)
PH BLDV: 7.36 — SIGNIFICANT CHANGE UP (ref 7.35–7.45)
PH UR: 6 — SIGNIFICANT CHANGE UP (ref 5–8)
PLATELET # BLD AUTO: 219 K/UL — SIGNIFICANT CHANGE UP (ref 150–400)
PLATELET # BLD AUTO: 225 K/UL — SIGNIFICANT CHANGE UP (ref 150–400)
PO2 BLDV: 29 MMHG — SIGNIFICANT CHANGE UP (ref 25–45)
POTASSIUM BLDV-SCNC: 3.7 MMOL/L — SIGNIFICANT CHANGE UP (ref 3.5–5.3)
POTASSIUM SERPL-MCNC: 3.9 MMOL/L — SIGNIFICANT CHANGE UP (ref 3.5–5.3)
POTASSIUM SERPL-MCNC: 4.4 MMOL/L — SIGNIFICANT CHANGE UP (ref 3.5–5.3)
POTASSIUM SERPL-SCNC: 3.9 MMOL/L — SIGNIFICANT CHANGE UP (ref 3.5–5.3)
POTASSIUM SERPL-SCNC: 4.4 MMOL/L — SIGNIFICANT CHANGE UP (ref 3.5–5.3)
PROT SERPL-MCNC: 6.4 G/DL — SIGNIFICANT CHANGE UP (ref 6–8.3)
PROT SERPL-MCNC: 6.7 G/DL — SIGNIFICANT CHANGE UP (ref 6–8.3)
PROT UR-MCNC: ABNORMAL
PROTHROM AB SERPL-ACNC: 10.7 SEC — SIGNIFICANT CHANGE UP (ref 10–12.9)
RBC # BLD: 4.75 M/UL — SIGNIFICANT CHANGE UP (ref 4.2–5.8)
RBC # BLD: 5.02 M/UL — SIGNIFICANT CHANGE UP (ref 4.2–5.8)
RBC # FLD: 14 % — SIGNIFICANT CHANGE UP (ref 10.3–14.5)
RBC # FLD: 14 % — SIGNIFICANT CHANGE UP (ref 10.3–14.5)
RBC CASTS # UR COMP ASSIST: 8 /HPF — SIGNIFICANT CHANGE UP (ref 0–4)
SAO2 % BLDV: 48 % — LOW (ref 67–88)
SARS-COV-2 RNA SPEC QL NAA+PROBE: SIGNIFICANT CHANGE UP
SODIUM SERPL-SCNC: 139 MMOL/L — SIGNIFICANT CHANGE UP (ref 135–145)
SODIUM SERPL-SCNC: 141 MMOL/L — SIGNIFICANT CHANGE UP (ref 135–145)
SP GR SPEC: 1.02 — SIGNIFICANT CHANGE UP (ref 1.01–1.02)
UROBILINOGEN FLD QL: NEGATIVE — SIGNIFICANT CHANGE UP
WBC # BLD: 11.98 K/UL — HIGH (ref 3.8–10.5)
WBC # BLD: 12.53 K/UL — HIGH (ref 3.8–10.5)
WBC # FLD AUTO: 11.98 K/UL — HIGH (ref 3.8–10.5)
WBC # FLD AUTO: 12.53 K/UL — HIGH (ref 3.8–10.5)
WBC UR QL: 35 /HPF — HIGH (ref 0–5)

## 2020-05-28 PROCEDURE — 99218: CPT | Mod: CS

## 2020-05-28 PROCEDURE — 74176 CT ABD & PELVIS W/O CONTRAST: CPT | Mod: 26

## 2020-05-28 RX ORDER — ONDANSETRON 8 MG/1
4 TABLET, FILM COATED ORAL ONCE
Refills: 0 | Status: COMPLETED | OUTPATIENT
Start: 2020-05-28 | End: 2020-05-28

## 2020-05-28 RX ORDER — CEFTRIAXONE 500 MG/1
1000 INJECTION, POWDER, FOR SOLUTION INTRAMUSCULAR; INTRAVENOUS ONCE
Refills: 0 | Status: COMPLETED | OUTPATIENT
Start: 2020-05-28 | End: 2020-05-28

## 2020-05-28 RX ORDER — KETOROLAC TROMETHAMINE 30 MG/ML
15 SYRINGE (ML) INJECTION EVERY 6 HOURS
Refills: 0 | Status: DISCONTINUED | OUTPATIENT
Start: 2020-05-28 | End: 2020-05-28

## 2020-05-28 RX ORDER — ACETAMINOPHEN 500 MG
975 TABLET ORAL ONCE
Refills: 0 | Status: COMPLETED | OUTPATIENT
Start: 2020-05-28 | End: 2020-05-28

## 2020-05-28 RX ORDER — ACETAMINOPHEN 500 MG
650 TABLET ORAL EVERY 6 HOURS
Refills: 0 | Status: DISCONTINUED | OUTPATIENT
Start: 2020-05-28 | End: 2020-06-01

## 2020-05-28 RX ORDER — ONDANSETRON 8 MG/1
4 TABLET, FILM COATED ORAL EVERY 8 HOURS
Refills: 0 | Status: DISCONTINUED | OUTPATIENT
Start: 2020-05-28 | End: 2020-06-01

## 2020-05-28 RX ORDER — SODIUM CHLORIDE 9 MG/ML
3 INJECTION INTRAMUSCULAR; INTRAVENOUS; SUBCUTANEOUS EVERY 8 HOURS
Refills: 0 | Status: DISCONTINUED | OUTPATIENT
Start: 2020-05-28 | End: 2020-06-01

## 2020-05-28 RX ORDER — SODIUM CHLORIDE 9 MG/ML
1000 INJECTION INTRAMUSCULAR; INTRAVENOUS; SUBCUTANEOUS
Refills: 0 | Status: DISCONTINUED | OUTPATIENT
Start: 2020-05-28 | End: 2020-06-01

## 2020-05-28 RX ORDER — CEFTRIAXONE 500 MG/1
1000 INJECTION, POWDER, FOR SOLUTION INTRAMUSCULAR; INTRAVENOUS EVERY 24 HOURS
Refills: 0 | Status: DISCONTINUED | OUTPATIENT
Start: 2020-05-28 | End: 2020-06-01

## 2020-05-28 RX ORDER — SODIUM CHLORIDE 9 MG/ML
1000 INJECTION INTRAMUSCULAR; INTRAVENOUS; SUBCUTANEOUS ONCE
Refills: 0 | Status: COMPLETED | OUTPATIENT
Start: 2020-05-28 | End: 2020-05-28

## 2020-05-28 RX ADMIN — SODIUM CHLORIDE 3 MILLILITER(S): 9 INJECTION INTRAMUSCULAR; INTRAVENOUS; SUBCUTANEOUS at 13:47

## 2020-05-28 RX ADMIN — Medication 650 MILLIGRAM(S): at 18:19

## 2020-05-28 RX ADMIN — ONDANSETRON 4 MILLIGRAM(S): 8 TABLET, FILM COATED ORAL at 09:29

## 2020-05-28 RX ADMIN — SODIUM CHLORIDE 1000 MILLILITER(S): 9 INJECTION INTRAMUSCULAR; INTRAVENOUS; SUBCUTANEOUS at 09:29

## 2020-05-28 RX ADMIN — CEFTRIAXONE 100 MILLIGRAM(S): 500 INJECTION, POWDER, FOR SOLUTION INTRAMUSCULAR; INTRAVENOUS at 12:03

## 2020-05-28 RX ADMIN — Medication 975 MILLIGRAM(S): at 10:40

## 2020-05-28 RX ADMIN — SODIUM CHLORIDE 100 MILLILITER(S): 9 INJECTION INTRAMUSCULAR; INTRAVENOUS; SUBCUTANEOUS at 15:00

## 2020-05-28 RX ADMIN — SODIUM CHLORIDE 3 MILLILITER(S): 9 INJECTION INTRAMUSCULAR; INTRAVENOUS; SUBCUTANEOUS at 22:09

## 2020-05-28 NOTE — ED PROVIDER NOTE - PROGRESS NOTE DETAILS
RICH Wolff: offered pain medication however refused and preferred anti-emetics only RICH Wolff: patient amenable to CDU for IV abx, pain control, and monitor close vital signs. urology aware of above

## 2020-05-28 NOTE — CONSULT NOTE ADULT - SUBJECTIVE AND OBJECTIVE BOX
HPI  75 y/o male PMHx prostate cancer s/p RALP , paroxysmal atrial fib on toprol no AC, nephrolithiasis, non smoker presented with right renal colic since 5AM this morning. Of note he has several year hx of asymptomatic bacteriuria for which he took keflex a few times and the urine cleared but had recurrence 1-2 months later. Cystoscopy by Dr. Deluna apparently revealed what sounds like bladder neck contracture and was not able to visualize bladder. Has also Seen Dr. Goldberg and Dr. Felix and decided on no intervention given no problems and reports that the office ultrasounds showed he was emptying bladder well. Never had any symptoms of UTI or fevers. Patient reported the pain is described to be a soreness, rated 7/10, and with associated nausea, no emesis. No fevers or chills, denies dysuria, frequency, urgency, incomplete emptying, straining, or incontinence or hematuria. In ED pain resolved with tylenol only. No pain meds at home.    PAST MEDICAL & SURGICAL HISTORY:  Prostate cancer  Paroxysmal atrial fibrillation      MEDICATIONS  (STANDING):  sodium chloride 0.9% lock flush 3 milliLiter(s) IV Push every 8 hours    MEDICATIONS  (PRN):      FAMILY HISTORY:      Allergies    Allergy Status Unknown    Intolerances        SOCIAL HISTORY:    REVIEW OF SYSTEMS: Otherwise negative as stated in HPI    Physical Exam  Vital signs  T(C): 36.8 (20 @ 11:40), Max: 37.2 (20 @ 09:22)  HR: 76 (20 @ 11:40)  BP: 131/66 (20 @ 11:40)  SpO2: 97% (20 @ 11:40)  Wt(kg): --    Output    UOP    Gen:  NAD    Pulm:  No respiratory distress  	  CV:  RRR    GI:  S/ND/NT    :  No SP tenderness, very mild R CVAT    MSK:      LABS:       @ 09:26    WBC 12.53 / Hct 48.0  / SCr 1.17         139  |  108  |  26<H>  ----------------------------<  126<H>  4.4   |  22  |  1.17    Ca    9.9      28 May 2020 09:26    TPro  6.7  /  Alb  3.9  /  TBili  0.4  /  DBili  x   /  AST  23  /  ALT  27  /  AlkPhos  76      PT/INR - ( 28 May 2020 09:26 )   PT: 10.7 sec;   INR: 0.93 ratio         PTT - ( 28 May 2020 09:26 )  PTT: 27.6 sec  Urinalysis Basic - ( 28 May 2020 11:02 )    Color: Light Yellow / Appearance: Clear / S.017 / pH: x  Gluc: x / Ketone: Negative  / Bili: Negative / Urobili: Negative   Blood: x / Protein: 30 mg/dL / Nitrite: Positive   Leuk Esterase: Large / RBC: 8 /hpf / WBC 35 /HPF   Sq Epi: x / Non Sq Epi: 0 /hpf / Bacteria: Many        Urine Cx:  Blood Cx:    RADIOLOGY:  < from: CT Abdomen and Pelvis No Cont (20 @ 10:10) >  PROCEDURE DATE:  2020            INTERPRETATION:  CLINICAL INFORMATION: Right flank pain. Evaluate for kidney stone.    COMPARISON: None.    PROCEDURE:   CT of the Abdomen and Pelvis wasperformed without intravenous contrast.   Intravenous contrast: None.  Oral contrast: None.  Sagittal and coronal reformats were performed.    FINDINGS:  LOWER CHEST: Within normal limits.    LIVER: Fluid attenuation lesion in the dome of the right hepatic lobe, consistent with benign etiology such as a cyst.  BILE DUCTS: Normal caliber.  GALLBLADDER: Within normal limits.  SPLEEN: Within normal limits.  PANCREAS: Within normal limits.  ADRENALS: Within normal limits.  KIDNEYS/URETERS: Bilateral parapelvic renal cysts. Mild right hydronephrosis to the level of a 6 mm stone at the UPJ. Bilateral nonobstructing renal stones measuring 7 mm in the upper pole of the left kidney and 4 mm in the upper pole of the right kidney.    BLADDER: Within normal limits.  REPRODUCTIVE ORGANS: No pelvic masses.    BOWEL: No bowel obstruction. Appendix is not visualized.  PERITONEUM: No ascites.  VESSELS: Atherosclerotic calcifications.  RETROPERITONEUM/LYMPH NODES: No lymphadenopathy.    ABDOMINAL WALL: Bilateral fat-containing inguinal hernia.  BONES: Degenerative changes.    IMPRESSION:     Right-sided obstructive uropathy with mild right hydronephrosis to the level of a 6 mm stone at the UPJ.      < end of copied text >

## 2020-05-28 NOTE — ED PROVIDER NOTE - CLINICAL SUMMARY MEDICAL DECISION MAKING FREE TEXT BOX
Claire: 74 year old male with cancer s/p resection here with right flank pain radiating to the groin. will get labs, ua, urine culture, ct a/p r/o stone. reassess

## 2020-05-28 NOTE — ED CDU PROVIDER DISPOSITION NOTE - NSFOLLOWUPINSTRUCTIONS_ED_ALL_ED_FT
1. STAY HYDRATED and Strain Urine.   2. Follow up with your Primary Care Physician within the next 2-3 days. Please call tomorrow to make an appointment.  3. Additionally recommend follow up with Urology within a week after discharge. Please see below for follow up information.   4. For pain take Motrin 400-600mg every 6 hrs as needed for pain (take with food). You may alternate with Tylenol 1g every 6 hours. Additionally take Flomax once each night.   5. Continue your current medication regim en.  6. Return to the Emergency Room if you experience new or worsening symptoms including increased or worsening pain, fevers, nausea or vomiting, burining with urination and all other concerns      [facility]   [phone]    [address] [place] [address] 1. STAY HYDRATED and Strain Urine.   2. Follow up with your Primary Care Physician within the next 2-3 days. Please call tomorrow to make an appointment.  3. Additionally recommend follow up with Urology within a week after discharge. Please see below for follow up information.   4. For pain take Motrin 400-600mg every 6 hours as needed for pain (take with food). You may alternate with Tylenol 1000 mg every 6 hours. Additionally take Flomax once each night.   5. Take Keflex as prescribed for your urinary tract infection.  6. Continue your current medication regimen.  7. Return to the Emergency Room if you experience new or worsening symptoms including increased or worsening pain, fevers, nausea or vomiting, burning with urination and all other concerns      [facility]   [phone]    [address] [place] [address] 1. STAY HYDRATED and Strain Urine.   2. Follow up with your Primary Care Physician within the next 2-3 days. Please call tomorrow to make an appointment.  3. Additionally recommend follow up with Urology within a week after discharge. Please see below for follow up information.   4. For pain take Motrin 400-600mg every 6 hours as needed for pain (take with food). You may alternate with Tylenol 1000 mg every 6 hours. Additionally take Flomax once each night.   5. Take Cefdinir 300mg twice a day for 10 days, as prescribed for your urinary tract infection.  6. Continue your current medication regimen.  7. Return to the Emergency Room if you experience new or worsening symptoms including increased or worsening pain, fevers, nausea or vomiting, burning with urination and all other concerns      [facility]   [phone]    [address] [place] [address]    Goldberg, Gary D D  UROLOGY  [address] [place] [address]  Phone: [phone]  Fax: [phone] 1. STAY HYDRATED and Strain Urine.   2. Follow up with your Primary Care Physician within the next 2-3 days. Please call tomorrow to make an appointment.  3. Additionally recommend follow up with Urology within a week after discharge. Please see below for follow up information.   4. For pain take Motrin 400-600mg every 6 hours as needed for pain (take with food). You may alternate with Tylenol 1000 mg every 6 hours.   5. Take Cefdinir 300mg twice a day for 10 days, as prescribed for your urinary tract infection.  6. Continue your current medication regimen.  7. Return to the Emergency Room if you experience new or worsening symptoms including increased or worsening pain, fevers, nausea or vomiting, burning with urination and all other concerns      [facility]   [phone]    [address] [place] [address]    Goldberg, Gary D D  UROLOGY  [address] [place] [id number]  Phone: [phone]  Fax: [phone]

## 2020-05-28 NOTE — ED CDU PROVIDER INITIAL DAY NOTE - PHYSICAL EXAMINATION
CONSTITUTIONAL: Patient is awake, alert and oriented x 3. Patient is well appearing and in no acute distress.  HEAD: NCAT,   NECK: supple,   LUNGS: CTA B/L,  HEART: RRR.+S1S2 no murmurs,   ABDOMEN: Soft nd/nt+ RCVAT  EXTREMITY: no edema or calf tenderness b/l, FROM upper and lower ext b/l  SKIN: with no rash or lesions.   NEURO: No focal deficits

## 2020-05-28 NOTE — ED CDU PROVIDER DISPOSITION NOTE - CLINICAL COURSE
73 y/o male PMHx prostate cancer s/p resection, paroxysmal atrial fib no AC, L sided kidney stone found incidentally,  presents to the ED c/o constant waxing and waning R flank pain since 5AM this morning. Patient reported the pain is described to be a soreness, rated 7/10, and with associated nausea. Patient unsure if it was a muscle strain as he was doing heavy lifting. Patient gave himself an enema as he thought he was constipated and had minimal bowel movement. Patient has not taken any medication for pain prior to arrival. He denies fevers, chills, chest pain, SOB, cough, respiratory symptoms, vomiting, diarrhea, dizziness, headache, numbness, paresthesias, headache, hematuria, dysuria, urinary freq, rash, penile discharge, testicular pain. Patient reported he is prone to UTIs but urologist cannot perform cystoscopy as he has scar tissue from previous prostate surgery.   In ED pt w/ leukocytosis 12.53, otherwise labs unremarkable. UA w/ 35wbc leuk and nitrite. Pt received dose iv abx. On CT found to have 6mm R UPJ stone. Decision made to send pt to CDU for further monitoring and management. While in CDU....... 73 y/o male PMHx prostate cancer s/p resection, paroxysmal atrial fib no AC, L sided kidney stone found incidentally,  presents to the ED c/o constant waxing and waning R flank pain since 5AM this morning. Patient reported the pain is described to be a soreness, rated 7/10, and with associated nausea. Patient unsure if it was a muscle strain as he was doing heavy lifting. Patient gave himself an enema as he thought he was constipated and had minimal bowel movement. Patient has not taken any medication for pain prior to arrival. He denies fevers, chills, chest pain, SOB, cough, respiratory symptoms, vomiting, diarrhea, dizziness, headache, numbness, paresthesias, headache, hematuria, dysuria, urinary freq, rash, penile discharge, testicular pain. Patient reported he is prone to UTIs but urologist cannot perform cystoscopy as he has scar tissue from previous prostate surgery.   In ED pt w/ leukocytosis 12.53, otherwise labs unremarkable. UA w/ 35wbc leuk and nitrite. Pt received dose iv abx. On CT found to have 6mm R UPJ stone. Decision made to send pt to CDU for further monitoring and management. While in CDU patient's pain remained control however he spiked a fever of 100.8. Urology was informed and recommended continuing to observe overnight. Fever improved with tylenol and pain remained controlled with no adverse events or return of symptoms overnight. In AM urology re-evaluated pt and recommended _______________________. 73 y/o male PMHx prostate cancer s/p resection, paroxysmal atrial fib no AC, L sided kidney stone found incidentally,  presents to the ED c/o constant waxing and waning R flank pain since 5AM this morning. Patient reported the pain is described to be a soreness, rated 7/10, and with associated nausea. Patient unsure if it was a muscle strain as he was doing heavy lifting. Patient gave himself an enema as he thought he was constipated and had minimal bowel movement. Patient has not taken any medication for pain prior to arrival. He denies fevers, chills, chest pain, SOB, cough, respiratory symptoms, vomiting, diarrhea, dizziness, headache, numbness, paresthesias, headache, hematuria, dysuria, urinary freq, rash, penile discharge, testicular pain. Patient reported he is prone to UTIs but urologist cannot perform cystoscopy as he has scar tissue from previous prostate surgery.   In ED pt w/ leukocytosis 12.53, otherwise labs unremarkable. UA w/ 35wbc leuk and nitrite. Pt received dose iv abx. On CT found to have 6mm R UPJ stone. Decision made to send pt to CDU for further monitoring and management. While in CDU patient's pain remained control however he spiked a fever of 100.8. Urology was informed and recommended continuing to observe overnight. WBC improved slightly to 11.98K. Fever improved with tylenol and pain remained controlled with no adverse events or return of symptoms overnight. In AM urology re-evaluated pt and recommended _______________________. 73 y/o male PMHx prostate cancer s/p resection, paroxysmal atrial fib no AC, L sided kidney stone found incidentally,  presents to the ED c/o constant waxing and waning R flank pain since 5AM this morning. Patient reported the pain is described to be a soreness, rated 7/10, and with associated nausea. Patient unsure if it was a muscle strain as he was doing heavy lifting. Patient gave himself an enema as he thought he was constipated and had minimal bowel movement. Patient has not taken any medication for pain prior to arrival. He denies fevers, chills, chest pain, SOB, cough, respiratory symptoms, vomiting, diarrhea, dizziness, headache, numbness, paresthesias, headache, hematuria, dysuria, urinary freq, rash, penile discharge, testicular pain. Patient reported he is prone to UTIs but urologist cannot perform cystoscopy as he has scar tissue from previous prostate surgery.   In ED pt w/ leukocytosis 12.53, otherwise labs unremarkable. UA w/ 35wbc leuk and nitrite. Pt received dose iv abx. On CT found to have 6mm R UPJ stone. Decision made to send pt to CDU for further monitoring and management. While in CDU patient's pain remained control however he spiked a fever of 100.8. Urology was informed and recommended continuing to observe overnight. WBC improved slightly to 11.98K. Fever improved with tylenol and pain remained controlled with no adverse events or return of symptoms overnight. In AM urology re-evaluated pt and recommended cefdinir 300mg BID x 10 days and outpatient follow up within the next week. MOLLY Contreras.

## 2020-05-28 NOTE — ED CDU PROVIDER INITIAL DAY NOTE - PROGRESS NOTE DETAILS
CDU NOTE RICH Mills: VSS NAD. Patient is resting comfortably and is without any complaints. Plan to repeat labs this evening and again in am CDU NOTE RICH Mills: NAD. Patient is resting comfortably and is without any complaints. Spiked fever to 100.8. Spoke to Urology who initially stated pt could go home from ED. Advised ED felt pt should stay and he was placed in CDU. Uro reports will discuss w/ attending and give recs Patient seen at bedside in NAD.  VSS.  Patient resting comfortably without complaints. Temp improved sp tylenol. Pt states he feels well. Denies current pain, n/v/d, chills. Ambulating around unit without difficulty. Urology resident returned call, aware of temp 100.8 and they d/w Dr. Goldberg, wishes to continue monitoring overnight and will see in AM< urology advised to call if temp continues to increase or anything changes. Will continue to monitor closely. - Luis Alberto Fernandes PA-C Patient seen at bedside in NAD.  VSS.  Patient resting comfortably without complaints. Temp improved sp tylenol. Pt states he feels well. Denies current pain, n/v/d, chills. Ambulating around unit without difficulty. Urology resident returned call, aware of temp 100.8 and they d/w Dr. Goldberg, wishes to continue monitoring overnight and will see in AM, urology advised to call if temp continues to increase or anything changes. Will continue to monitor closely. - Luis Alberto Fernandes PA-C

## 2020-05-28 NOTE — ED ADULT NURSE NOTE - OBJECTIVE STATEMENT
74 y.o M A&OX3 with PMH of kidney stone, afib and prostate CA, presents to the ED c.o R sided abd/flank pain since last night. Pt. reports symptoms presented last night and worsened around 0500 this AM associated with nausea. States he was having difficulty sleeping due to the discomfort. Thought he may be constipated so he gave himself an enema. Had a small BM with minimal relief. Denies any episodes of vomiting. Describes pain as "twisting" sensation. Rates pain level 8/10 at this time. Does not radiate to groin region. + R sided CVA tenderness noted. Denies hematuria, dysuria, nausea, fevers, chills, palpitations, CP, and SOB. Pt. states he was heavy lifting yesterday. Safety and comfort provided.

## 2020-05-28 NOTE — ED PROVIDER NOTE - OBJECTIVE STATEMENT
73 y/o male PMHx prostate cancer s/p resection, paroxysmal atrial fib on toprol no AC, L sided kidney stone no instrumentation found incidentally, non smoker now presenting to the ED c/o constant waxing and waning flank pain since 5AM this morning. Patient reported the pain is described to be a soreness, rated 7/10, and with associated nausea. Patient unsure if it was a muscle strain as he was doing heavy lifting. Patient gave himself an enema as he thought he was constipated and had minimal bowel movement. Patient has not taken any medication for pain prior to arrival. Patient denied CP, SOB, cough, respiratory symptoms, vomiting, diarrhea, dizziness, headache, numbness, paresthesias, headache, hematuria, dysuria, urinary freq, rash, penile discharge, testicular pain, COVID contact, fever. Patient reported he is prone to UTIs but urologist cannot perform cystoscopy as he has scar tissue from previous prostate surgery

## 2020-05-28 NOTE — CONSULT NOTE ADULT - ASSESSMENT
73 yo M with R renal colic    - no signs of infection  - currently does not have any pain  - would treat with cephalosporin or similar for asymptomatic bacteriuria in setting of ureteral stone for 5-7 days  - follow-up urine culture  - bladder neck contracture would complicate retrograde stent placement  - follow-up with Dr. Goldberg next week, please call office tomorrow for appointment  - return precautions given to patient including fevers, chills, emesis, unremitting pain, and all questions answered. Patient agrees with plan, prefers to avoid intervention and this is reasonable at this time  - discussed with Dr. Goldberg

## 2020-05-28 NOTE — ED ADULT TRIAGE NOTE - CHIEF COMPLAINT QUOTE
right flank/abdominal pain, "squeezing"  associated with nausea since 0500   no urinary symptoms, fevers, chills, chest pain  PMH kidney stones

## 2020-05-28 NOTE — ED CDU PROVIDER INITIAL DAY NOTE - MEDICAL DECISION MAKING DETAILS
Claire: 74 year old male with right sided kidney stone with positive urine. will c/w iv abx, urology consulting, pain control, reassess

## 2020-05-28 NOTE — ED CDU PROVIDER DISPOSITION NOTE - PATIENT PORTAL LINK FT
You can access the FollowMyHealth Patient Portal offered by [facility] by registering at the following website: [email or web address]. By joining [employer] [facility]’s FollowMyHealth portal, you will also be able to view your health information using other applications (apps) compatible with our system.

## 2020-05-28 NOTE — ED CDU PROVIDER DISPOSITION NOTE - ATTENDING CONTRIBUTION TO CARE
I, Basim Contreras, have personally performed a face to face diagnostic evaluation on this patient.  I have reviewed the ACP note and agree with the history, exam, and plan of care, except as noted.  History and Exam by me shows pt improved and without pain.  Patient stable for outpatient f/u with urology.

## 2020-05-29 VITALS
OXYGEN SATURATION: 97 % | RESPIRATION RATE: 18 BRPM | TEMPERATURE: 98 F | DIASTOLIC BLOOD PRESSURE: 75 MMHG | SYSTOLIC BLOOD PRESSURE: 148 MMHG | HEART RATE: 73 BPM

## 2020-05-29 DIAGNOSIS — Z90.79 ACQUIRED ABSENCE OF OTHER GENITAL ORGAN(S): Chronic | ICD-10-CM

## 2020-05-29 DIAGNOSIS — C61 MALIGNANT NEOPLASM OF PROSTATE: ICD-10-CM

## 2020-05-29 DIAGNOSIS — N20.0 CALCULUS OF KIDNEY: ICD-10-CM

## 2020-05-29 DIAGNOSIS — N39.0 URINARY TRACT INFECTION, SITE NOT SPECIFIED: ICD-10-CM

## 2020-05-29 PROCEDURE — 87086 URINE CULTURE/COLONY COUNT: CPT

## 2020-05-29 PROCEDURE — 82947 ASSAY GLUCOSE BLOOD QUANT: CPT

## 2020-05-29 PROCEDURE — 99217: CPT | Mod: CS

## 2020-05-29 PROCEDURE — 83605 ASSAY OF LACTIC ACID: CPT

## 2020-05-29 PROCEDURE — 84132 ASSAY OF SERUM POTASSIUM: CPT

## 2020-05-29 PROCEDURE — 82803 BLOOD GASES ANY COMBINATION: CPT

## 2020-05-29 PROCEDURE — 84295 ASSAY OF SERUM SODIUM: CPT

## 2020-05-29 PROCEDURE — 96375 TX/PRO/DX INJ NEW DRUG ADDON: CPT

## 2020-05-29 PROCEDURE — 83690 ASSAY OF LIPASE: CPT

## 2020-05-29 PROCEDURE — 87186 SC STD MICRODIL/AGAR DIL: CPT

## 2020-05-29 PROCEDURE — 85014 HEMATOCRIT: CPT

## 2020-05-29 PROCEDURE — 85730 THROMBOPLASTIN TIME PARTIAL: CPT

## 2020-05-29 PROCEDURE — 96374 THER/PROPH/DIAG INJ IV PUSH: CPT

## 2020-05-29 PROCEDURE — 80053 COMPREHEN METABOLIC PANEL: CPT

## 2020-05-29 PROCEDURE — G0378: CPT

## 2020-05-29 PROCEDURE — 85610 PROTHROMBIN TIME: CPT

## 2020-05-29 PROCEDURE — 82330 ASSAY OF CALCIUM: CPT

## 2020-05-29 PROCEDURE — 81001 URINALYSIS AUTO W/SCOPE: CPT

## 2020-05-29 PROCEDURE — 96361 HYDRATE IV INFUSION ADD-ON: CPT

## 2020-05-29 PROCEDURE — 99284 EMERGENCY DEPT VISIT MOD MDM: CPT | Mod: 25

## 2020-05-29 PROCEDURE — 85027 COMPLETE CBC AUTOMATED: CPT

## 2020-05-29 PROCEDURE — 74176 CT ABD & PELVIS W/O CONTRAST: CPT

## 2020-05-29 PROCEDURE — 96376 TX/PRO/DX INJ SAME DRUG ADON: CPT

## 2020-05-29 PROCEDURE — 82435 ASSAY OF BLOOD CHLORIDE: CPT

## 2020-05-29 RX ORDER — CEFDINIR 250 MG/5ML
1 POWDER, FOR SUSPENSION ORAL
Qty: 20 | Refills: 0
Start: 2020-05-29 | End: 2020-06-07

## 2020-05-29 RX ADMIN — SODIUM CHLORIDE 1000 MILLILITER(S): 9 INJECTION INTRAMUSCULAR; INTRAVENOUS; SUBCUTANEOUS at 02:26

## 2020-05-29 RX ADMIN — CEFTRIAXONE 100 MILLIGRAM(S): 500 INJECTION, POWDER, FOR SOLUTION INTRAMUSCULAR; INTRAVENOUS at 09:51

## 2020-05-29 RX ADMIN — SODIUM CHLORIDE 3 MILLILITER(S): 9 INJECTION INTRAMUSCULAR; INTRAVENOUS; SUBCUTANEOUS at 05:44

## 2020-05-29 NOTE — ED CDU PROVIDER SUBSEQUENT DAY NOTE - ATTENDING CONTRIBUTION TO CARE
I, Basim Contreras, have personally performed a face to face diagnostic evaluation on this patient.  I have reviewed the ACP note and agree with the history, exam, and plan of care, except as noted.  History and Exam by me shows pt feels improved, without fever, and comfortable with plan of outpatient abx and f/u as outpatient.

## 2020-05-29 NOTE — CONSULT NOTE ADULT - ASSESSMENT
right proximal stone with history of recurrent uti and bladder neck contracture  patient refusing surgical therapy   advised of concern with stricture and infections and emergent intervention if febrile with nt  treatment options discussed include medical expulsive therapy, elective eswl,   cysto with turbn and ureteroscopy   pcn and stone extraction'    patient wishes for conservative therapy

## 2020-05-29 NOTE — CONSULT NOTE ADULT - SUBJECTIVE AND OBJECTIVE BOX
HPI  Patient not seen by me for years but presented to [facility] with sudden onseet of severe right flank pain associated with recurrent uti and recurrent strictures following ralp by Dr Shane.  He has had intermittent pain without fever or chills, n/v.  Hi is a 73 y/o male PMHx prostate cancer s/p RALP , paroxysmal atrial fib on toprol no AC, nephrolithiasis, non smoker presented with right renal colic since 5AM this morning. Of note he has several year hx of asymptomatic bacteriuria for which he took keflex a few times and the urine cleared but had recurrence 1-2 months later. Cystoscopy by Dr. Deluna cw bladder neck contracture and was not able to visualize bladder. Patient and Dr Deluna  decided on no intervention given no problems and reports that the office ultrasounds showed he was emptying bladder well. Never had any symptoms of UTI or fevers. Patient reported the pain is described to be a soreness, rated 7/10, and with associated nausea, no emesis. No fevers or chills, denies dysuria, frequency, urgency, incomplete emptying, straining, or incontinence or hematuria. In ED pain resolved with tylenol only. No pain meds at home.      PAST MEDICAL & SURGICAL HISTORY:  Prostate cancer  Paroxysmal atrial fibrillation  History of robot-assisted laparoscopic radical prostatectomy      MEDICATIONS  (STANDING):  cefTRIAXone   IVPB 1000 milliGRAM(s) IV Intermittent every 24 hours  sodium chloride 0.9% lock flush 3 milliLiter(s) IV Push every 8 hours  sodium chloride 0.9%. 1000 milliLiter(s) (100 mL/Hr) IV Continuous <Continuous>    MEDICATIONS  (PRN):  acetaminophen   Tablet .. 650 milliGRAM(s) Oral every 6 hours PRN Mild Pain (1 - 3)  ketorolac   Injectable 15 milliGRAM(s) IV Push every 6 hours PRN Mild Pain (1 - 3)  ondansetron Injectable 4 milliGRAM(s) IV Push every 8 hours PRN Nausea and/or Vomiting    FAMILY HISTORY:  No pertinent family history in first degree relatives    Allergies    No Known Allergies    SOCIAL HISTORY:  no etoh ros    REVIEW OF SYSTEMS:   Gen neg neck neg heent neg  res neg cv ne  gu per hpi gi neg  msk pain neuro neg  all neg skin neg psych neg  endo neg    Physical Exam  Vital signs  T(C): 36.7 (20 @ 07:46), Max: 38.2 (20 @ 18:09)  HR: 73 (20 @ 07:46)  BP: 148/75 (20 @ 07:46)  SpO2: 97% (20 @ 07:46)    Gen:  WDWN male in nad  heent ncat neck symm supple  cor reg  chest clear  abd soft nd nt no cvat no spt no debbie guarding  gu absent prostate no cvat  neuro non focal psych a and o x 3  ext no c.c.e  LABS:       @ 21:46    WBC 11.98 / Hct 45.8  / SCr 1.17      @ 09:26    WBC 12.53 / Hct 48.0  / SCr 1.17         141  |  108  |  21  ----------------------------<  103<H>  3.9   |  24  |  1.17    Ca    10.0      28 May 2020 21:46    TPro  6.4  /  Alb  3.7  /  TBili  0.4  /  DBili  x   /  AST  20  /  ALT  26  /  AlkPhos  69      PT/INR - ( 28 May 2020 09:26 )   PT: 10.7 sec;   INR: 0.93 ratio         PTT - ( 28 May 2020 09:26 )  PTT:27.6 sec  Urinalysis Basic - ( 28 May 2020 11:02 )    Color: Light Yellow / Appearance: Clear / S.017 / pH: x  Gluc: x / Ketone: Negative  / Bili: Negative / Urobili: Negative   Blood: x / Protein: 30 mg/dL / Nitrite: Positive   Leuk Esterase: Large / RBC: 8 /hpf / WBC 35 /HPF   Sq Epi: x / Non Sq Epi: 0 /hpf / Bacteria: Many        RADIOLOGY: right proximal renal stone

## 2020-05-29 NOTE — ED CDU PROVIDER SUBSEQUENT DAY NOTE - MEDICAL DECISION MAKING DETAILS
Patient with R UPJ stone and with fever/chills.  Patient feels improved and received abx.  Patient without pain and currently asymptomatic.  Patient stable and seen by urology.  Patient stable to f/u with urology as outpatient.

## 2020-05-29 NOTE — ED ADULT NURSE REASSESSMENT NOTE - NS ED NURSE REASSESS COMMENT FT1
07.00 Am Received Report from Erik Lala . Pt is observed for Renal stones/+UTI .  Pt is A&OX 4 denies N/V/D fever chills cp sob   Pt is pain free now  has stable vitals afebrile now  Comfort care & safety measures maintained    Iv site looks clean & dry no signs of infiltration noted  Continue to monitor   09.20 Am Pt is evaluated by CDU MD Contreras . Pt is feeling better Pt is Discharged ML out PA  Aidee Munoz  Explained the follow up care & gave the discharge summary Pt verbalized the understanding on follow up care Pt stable to go home Pt had stable vitals steady gait A&OX 4 at the time of Discharge.

## 2020-05-29 NOTE — ED CDU PROVIDER SUBSEQUENT DAY NOTE - PROGRESS NOTE DETAILS
Patient sleeping. NAD. No complaints. VSS. No interval changes from previous CDU note. Pt has been ambulating to bathroom w/o difficulty. Has not complained of pain, fever/chills. Temp 99.3, improved from previous. Urology to re-evaluate this AM. Will continue to monitor. - Luis Alberto Fernandes PA-C Patient seen at bedside. VSS. Patient resting comfortably, no complaints. Patient spoke with urology, recommending outpatient follow up. Will DW Urology for final plan. Will reevaluate. - Aidee Munoz PA-C Spoke with Urology, patient can be DC home with Cefdinir for 10 days, with follow up with Dr. Goldberg within the next week. MOLLY Contreras.- Aidee Munoz PA-C

## 2020-05-29 NOTE — ED CDU PROVIDER SUBSEQUENT DAY NOTE - CONSTITUTIONAL, MLM
normal... Well appearing elderly male, awake, alert, oriented to person, place, time/situation and in no apparent distress.

## 2020-05-29 NOTE — ED CDU PROVIDER SUBSEQUENT DAY NOTE - HISTORY
75 yo male PMhx prostate CA and afib, currently being observed overnight in CDU for monitoring of a 6mm R UPJ w/ mild hydro. Patient seen at bedside in NAD.  VSS.  Patient resting comfortably without complaints. No interval changes from previous CDU note. Temp remains 100.1. Pt denies current pain, fever/chills, n/v/d, weakness, abdominal pain. Will continue to monitor. - Luis Alberto Fernandes PA-C

## 2020-06-02 ENCOUNTER — APPOINTMENT (OUTPATIENT)
Dept: FAMILY MEDICINE | Facility: CLINIC | Age: 75
End: 2020-06-02
Payer: MEDICARE

## 2020-06-02 VITALS
OXYGEN SATURATION: 98 % | SYSTOLIC BLOOD PRESSURE: 124 MMHG | RESPIRATION RATE: 16 BRPM | HEART RATE: 68 BPM | BODY MASS INDEX: 25.62 KG/M2 | WEIGHT: 183 LBS | DIASTOLIC BLOOD PRESSURE: 82 MMHG | HEIGHT: 71 IN

## 2020-06-02 VITALS — DIASTOLIC BLOOD PRESSURE: 80 MMHG | HEART RATE: 72 BPM | SYSTOLIC BLOOD PRESSURE: 136 MMHG | RESPIRATION RATE: 16 BRPM

## 2020-06-02 PROCEDURE — 99495 TRANSJ CARE MGMT MOD F2F 14D: CPT

## 2020-06-02 NOTE — REVIEW OF SYSTEMS
[Fever] : no fever [Chills] : no chills [Diarrhea] : no diarrhea [Hematuria] : no hematuria [Dysuria] : no dysuria [Back Pain] : back pain

## 2020-06-02 NOTE — ASSESSMENT
[email or web address] [address]  [place], [address]      Brief Procedure Note    Aldo Strickland  1957  [id number]    Date of Procedure: 5/30/2019    Preoperative diagnosis: -    Postoperative diagnosis: ENDO: PHG  COLO: Colon Polyps    Type of Anesthesia: MAC (Monitored anesthesia care)    Description of findings: same as post op dx    Procedure: Procedure(s):  UPPER ENDOSCOPY  COLONOSCOPY / polypectomy    :  Dr. Yasmani Francisco MD    Assistant(s): Endoscopy Technician-1: Tabitha Dodd  Endoscopy RN-1: Forrest Stahl RN  Endoscopy RN-2: Elizabeth Guerrero RN    Devices/implants/grafts/tissues/prosthesis: None    EBL:None    Specimens:   ID Type Source Tests Collected by Time Destination   1 : polyp rectum Preservative Rectum  Brook Gonzalez MD 5/30/2019 1519 Pathology   2 : Polyp Descending Preservative Colon, Descending  Brook Gonzalez MD 5/30/2019 1520 Pathology   3 : Polyp Transverse Preservative Colon, Alveria Asa  Brook Gonzalez MD 5/30/2019 1521 Pathology   4 : Polyp Cecum Preservative Cecum  Brook Gonzalez MD 5/30/2019 1524 Pathology   5 : Polyp Ascending Preservative Colon, Ascending  Brook Gonzalez MD 5/30/2019 1531 Pathology       Findings: See printed and scanned procedure note    Complications: None    Dr. Yasmani Francisco MD  5/30/2019  3:50 PM [FreeTextEntry1] : URETERAL STONE  STILL IN PLACE  BUT NO TEMP OR CHILL  INCREASE FLUIDS

## 2020-06-02 NOTE — HISTORY OF PRESENT ILLNESS
[FreeTextEntry1] : U  [de-identified] : WAS  ADMITTED  TO [facility] WITH RT  SIDED  KIDNEY STONE  WAS  DISCHARGED 5/28/20  SAW   HAS NOT PASSED THE STONE NEXT  NEEDS  STENT OR  POSSIBLE LITHOTRIPSY STONE IS  6 MM ALSO ON CEFDINIR 300 MG  DAILY

## 2020-06-02 NOTE — PHYSICAL EXAM
[No Acute Distress] : no acute distress [Supple] : supple [Normal TMs] : both tympanic membranes were normal [PERRL] : pupils equal round and reactive to light [Regular Rhythm] : with a regular rhythm [No Murmur] : no murmur heard [Clear to Auscultation] : lungs were clear to auscultation bilaterally [Normal Posterior Cervical Nodes] : no posterior cervical lymphadenopathy [Normal Supraclavicular Nodes] : no supraclavicular lymphadenopathy [No Edema] : there was no peripheral edema [No Rash] : no rash [Normal Anterior Cervical Nodes] : no anterior cervical lymphadenopathy [Normal] : no CVA or spinal tenderness [Normal Gait] : normal gait

## 2020-06-04 ENCOUNTER — APPOINTMENT (OUTPATIENT)
Dept: UROLOGY | Facility: CLINIC | Age: 75
End: 2020-06-04

## 2020-06-22 PROBLEM — C61 MALIGNANT NEOPLASM OF PROSTATE: Chronic | Status: ACTIVE | Noted: 2020-05-28

## 2020-06-22 PROBLEM — I48.0 PAROXYSMAL ATRIAL FIBRILLATION: Chronic | Status: ACTIVE | Noted: 2020-05-28

## 2020-06-25 ENCOUNTER — APPOINTMENT (OUTPATIENT)
Dept: FAMILY MEDICINE | Facility: CLINIC | Age: 75
End: 2020-06-25
Payer: MEDICARE

## 2020-06-25 VITALS — DIASTOLIC BLOOD PRESSURE: 80 MMHG | RESPIRATION RATE: 16 BRPM | HEART RATE: 72 BPM | SYSTOLIC BLOOD PRESSURE: 140 MMHG

## 2020-06-25 VITALS
HEART RATE: 69 BPM | OXYGEN SATURATION: 98 % | DIASTOLIC BLOOD PRESSURE: 100 MMHG | SYSTOLIC BLOOD PRESSURE: 150 MMHG | RESPIRATION RATE: 16 BRPM | WEIGHT: 182 LBS | HEIGHT: 71 IN | BODY MASS INDEX: 25.48 KG/M2

## 2020-06-25 PROCEDURE — 36415 COLL VENOUS BLD VENIPUNCTURE: CPT

## 2020-06-25 PROCEDURE — 99214 OFFICE O/P EST MOD 30 MIN: CPT | Mod: 25

## 2020-06-25 NOTE — ASSESSMENT
[High Risk Surgery - Intraperitoneal, Intrathoracic or Supringuinal Vascular Procedures] : High Risk Surgery - Intraperitoneal, Intrathoracic or Supringuinal Vascular Procedures - No (0) [Congestive Heart Failure] : Congestive Heart Failure - No (0) [Ischemic Heart Disease] : Ischemic Heart Disease - No (0) [Prior Cerebrovascular Accident or TIA] : Prior Cerebrovascular Accident or TIA - No (0) [Creatinine >= 2mg/dL (1 Point)] : Creatinine >= 2mg/dL - No (0) [Insulin-dependent Diabetic (1 Point)] : Insulin-dependent Diabetic - No (0) [0] : 0 , RCRI Class: I, Risk of Post-Op Cardiac Complications: 3.9%, 95% CI for Risk Estimate: 2.8% - 5.4% [Patient Optimized for Surgery] : Patient optimized for surgery [No Further Testing Recommended] : no further testing recommended [FreeTextEntry4] : acceptable medical condition for surgery\par

## 2020-06-25 NOTE — CONSULT LETTER
[Consult Letter:] : I had the pleasure of evaluating your patient, [unfilled]. [Dear  ___] : Dear  [unfilled], [( Thank you for referring [unfilled] for consultation for _____ )] : Thank you for referring [unfilled] for consultation for [unfilled] [Consult Closing:] : Thank you very much for allowing me to participate in the care of this patient.  If you have any questions, please do not hesitate to contact me. [Sincerely,] : Sincerely,

## 2020-06-25 NOTE — HISTORY OF PRESENT ILLNESS
[FreeTextEntry1] : lithotripsy  [FreeTextEntry3] : Dr.Goldberg  [FreeTextEntry2] : 6/30/20 [FreeTextEntry4] : pt isw a 75 year old  male here for clearance for lithotripsy right side .  Pt active  medical problems include HLD  ATRIAL FIB CA PROSTATE

## 2020-06-25 NOTE — RESULTS/DATA
[] : results reviewed [de-identified] : BUN 22  CREATININE  1.2 UA  MOD  BLOOD  [de-identified] : PTT  26.7 INR 1.0  [de-identified] : WBC  5.51 HGB 15.5 PLTS 224  [de-identified] : NSR  WNL NSC

## 2020-06-25 NOTE — REVIEW OF SYSTEMS
[Fever] : no fever [Sore Throat] : no sore throat [Chest Pain] : no chest pain [Earache] : no earache [Shortness Of Breath] : no shortness of breath [Palpitations] : no palpitations [Wheezing] : no wheezing [Abdominal Pain] : no abdominal pain [Cough] : no cough [Diarrhea] : no diarrhea [Constipation] : no constipation [Skin Rash] : no skin rash [Dysuria] : no dysuria [Joint Swelling] : no joint swelling [Dizziness] : no dizziness [Headache] : no headache [Swollen Glands] : no swollen glands

## 2020-08-11 NOTE — ED ADULT NURSE REASSESSMENT NOTE - NS ED NURSE REASSESS COMMENT FT1
18.00 Received report from  Erik Caicedo  . Pt is observed for Chest pain for Nuclear stress test . Pt is A&OX 4  obeys commands  Ashwini N/V/D CP SOB Has steady gait  has 100.8F temp  with  chills medicated as per order  IV site looks clean & dry no signs of infiltration noted  Comfort care & safety measures continued  Pt advised to call for help if needed  call bell with in the reach   Continue to monitor Alert-The patient is alert, awake and responds to voice. The patient is oriented to time, place, and person. The triage nurse is able to obtain subjective information.

## 2020-09-16 ENCOUNTER — APPOINTMENT (OUTPATIENT)
Dept: UROLOGY | Facility: CLINIC | Age: 75
End: 2020-09-16

## 2020-10-12 ENCOUNTER — APPOINTMENT (OUTPATIENT)
Dept: FAMILY MEDICINE | Facility: CLINIC | Age: 75
End: 2020-10-12
Payer: MEDICARE

## 2020-10-12 PROCEDURE — 90662 IIV NO PRSV INCREASED AG IM: CPT

## 2020-10-12 PROCEDURE — G0008: CPT

## 2020-12-02 ENCOUNTER — APPOINTMENT (OUTPATIENT)
Dept: CT IMAGING | Facility: CLINIC | Age: 75
End: 2020-12-02
Payer: MEDICARE

## 2020-12-02 ENCOUNTER — OUTPATIENT (OUTPATIENT)
Dept: OUTPATIENT SERVICES | Facility: HOSPITAL | Age: 75
LOS: 1 days | End: 2020-12-02
Payer: MEDICARE

## 2020-12-02 DIAGNOSIS — Z90.79 ACQUIRED ABSENCE OF OTHER GENITAL ORGAN(S): Chronic | ICD-10-CM

## 2020-12-02 DIAGNOSIS — Z00.8 ENCOUNTER FOR OTHER GENERAL EXAMINATION: ICD-10-CM

## 2020-12-02 DIAGNOSIS — N20.0 CALCULUS OF KIDNEY: ICD-10-CM

## 2020-12-02 PROCEDURE — 74176 CT ABD & PELVIS W/O CONTRAST: CPT | Mod: 26

## 2020-12-02 PROCEDURE — 74176 CT ABD & PELVIS W/O CONTRAST: CPT

## 2020-12-07 ENCOUNTER — APPOINTMENT (OUTPATIENT)
Dept: UROLOGY | Facility: CLINIC | Age: 75
End: 2020-12-07
Payer: MEDICARE

## 2020-12-07 DIAGNOSIS — N39.46 MIXED INCONTINENCE: ICD-10-CM

## 2020-12-07 DIAGNOSIS — Z84.1 FAMILY HISTORY OF DISORDERS OF KIDNEY AND URETER: ICD-10-CM

## 2020-12-07 DIAGNOSIS — N20.9 URINARY CALCULUS, UNSPECIFIED: ICD-10-CM

## 2020-12-07 DIAGNOSIS — N52.31 ERECTILE DYSFUNCTION FOLLOWING RADICAL PROSTATECTOMY: ICD-10-CM

## 2020-12-07 DIAGNOSIS — R35.0 FREQUENCY OF MICTURITION: ICD-10-CM

## 2020-12-07 LAB
BILIRUB UR QL STRIP: NORMAL
CLARITY UR: CLEAR
COLLECTION METHOD: NORMAL
GLUCOSE UR-MCNC: NORMAL
HCG UR QL: 0.2 EU/DL
HGB UR QL STRIP.AUTO: NORMAL
KETONES UR-MCNC: NORMAL
LEUKOCYTE ESTERASE UR QL STRIP: NORMAL
NITRITE UR QL STRIP: NORMAL
PH UR STRIP: 6
PROT UR STRIP-MCNC: NORMAL
SP GR UR STRIP: 1.01

## 2020-12-07 PROCEDURE — 51798 US URINE CAPACITY MEASURE: CPT

## 2020-12-07 PROCEDURE — 99215 OFFICE O/P EST HI 40 MIN: CPT | Mod: 25

## 2020-12-07 PROCEDURE — 81003 URINALYSIS AUTO W/O SCOPE: CPT | Mod: QW

## 2020-12-07 RX ORDER — METOPROLOL SUCCINATE 100 MG/1
100 TABLET, EXTENDED RELEASE ORAL
Refills: 0 | Status: ACTIVE | COMMUNITY

## 2020-12-07 NOTE — HISTORY OF PRESENT ILLNESS
[FreeTextEntry1] : Mr. BAKARI MANTILLA comes in today for his urologic follow up. 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 Shane. Postoperatively he had a bladder stone causing urinary retention, which was managed by Dr. Salazar.  Since his prostate surgery, Mr. Mantilla has had stress incontinence and wears 3-4 pads per day.  In addition, he has ED. His PSAs have remained undetectable.\par \par From his general urologic history, Mr. Mantilla reports mild lower urinary tract symptoms, with nocturia x 1.\par IPSS: 3/35\par Sono: 10cc PVR\par \par At the end of May 2020 he awakened with right flank pain and was diagnosed with a kidney stone  (see below). He then had a procedure for this, which was likely a ureteroscopy +/- a urethral dilation/bladder neck DVIU (no records available). \par \par Mr. Mantilla has erectile dysfunction.  Initially he tried Viagra, without any improvement.  He has been using Trimix, successfully, since his surgery. \par \par PSAs: 11/4/20--<0.01; 10/8/18--0.0; \par \par Prostate Bx: 7/8/05--Prostate adenocarcinoma. [place] 3+4=7; 6/14/05--[place] 3+3=6\par \par CT abd/pelvis: 12/2/20--3mm right and 6mm left renal stone.  No hydronephrosis; 6/23/20--4mm right midpole stone. 6mm right proximal ureteral stone. 5/28/20--6mm right UPJ stone with mild hydronephrosis. Bilateral renal stones 7mm in the left upper pole and 4mm in the right upper pole;  11/16/05--s/p prostatectomy. Bladder contains 2.1 cm calculus. \par \par Renal sono:  10/6/20--6mm right renal stone. 3.8cm septated left renal cyst.

## 2020-12-07 NOTE — ASSESSMENT
[FreeTextEntry1] : I discussed the findings and options with Mr. BAKARI MANTILLA in detail.\par He does not want intervention for the postoperative stress incontinence (the most effective being an artificial sphincter).\par \par Regarding the ED, he will continue with Trimix (and will send us his current dose when he needs a refill).\par \par The acute urolithiasis episode was managed with a procedure in June and I have asked Mr. Mantilla to send me the operative report.  He will forego treatment for the bilateral renal stones at this time (the preferred being a left ESWL procedure). \par \par Providing there are no new problems, Mr. Mantilla should return in one year (sono PVR, renal sono, PSA).

## 2020-12-07 NOTE — REVIEW OF SYSTEMS
[Dry Eyes] : dryness of the eyes [Palpitations] : palpitations [Constipation] : constipation [Incontinence] : incontinence [Negative] : Heme/Lymph [FreeTextEntry3] : Sinus problems

## 2020-12-07 NOTE — HISTORY OF PRESENT ILLNESS
[FreeTextEntry1] : Mr. BAKARI MANTILLA comes in today for his urologic follow up. 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 Shane. Postoperatively he had a bladder stone causing urinary retention, which was managed by Dr. Salazar.  Since his prostate surgery, Mr. Mantilla has had stress incontinence and wears 3-4 pads per day.  In addition, he has ED. His PSAs have remained undetectable.\par \par From his general urologic history, Mr. Mantilla reports mild lower urinary tract symptoms, with nocturia x 1.\par IPSS: 3/35\par Sono: 10cc PVR\par \par At the end of May 2020 he awakened with right flank pain and was diagnosed with a kidney stone  (see below). He then had a procedure for this, which was likely a ureteroscopy +/- a urethral dilation/bladder neck DVIU (no records available). \par \par Mr. Mantilla has erectile dysfunction.  Initially he tried Viagra, without any improvement.  He has been using Trimix, successfully, since his surgery. \par \par PSAs: 11/4/20--<0.01; 10/8/18--0.0; \par \par Prostate Bx: 7/8/05--Prostate adenocarcinoma. [place] 3+4=7; 6/14/05--[place] 3+3=6\par \par CT abd/pelvis: 12/2/20--3mm right and 6mm left renal stone.  No hydronephrosis; 6/23/20--4mm right midpole stone. 6mm right proximal ureteral stone. 5/28/20--6mm right UPJ stone with mild hydronephrosis. Bilateral renal stones 7mm in the left upper pole and 4mm in the right upper pole;  11/16/05--s/p prostatectomy. Bladder contains 2.1 cm calculus. \par \par Renal sono:  10/6/20--6mm right renal stone. 3.8cm septated left renal cyst.

## 2020-12-07 NOTE — LETTER BODY
[Dear  ___] : Dear  [unfilled], [Consult Letter:] : I had the pleasure of evaluating your patient, [unfilled]. [Please see my note below.] : Please see my note below. [Consult Closing:] : Thank you very much for allowing me to participate in the care of this patient.  If you have any questions, please do not hesitate to contact me. [Sincerely,] : Sincerely, [FreeTextEntry3] : Calvin Wilder MD, FACS

## 2020-12-07 NOTE — PHYSICAL EXAM
[No Prostate Nodules] : no prostate nodules [General Appearance - Well Developed] : well developed [General Appearance - Well Nourished] : well nourished [Normal Appearance] : normal appearance [Well Groomed] : well groomed [General Appearance - In No Acute Distress] : no acute distress [Edema] : no peripheral edema [Respiration, Rhythm And Depth] : normal respiratory rhythm and effort [Exaggerated Use Of Accessory Muscles For Inspiration] : no accessory muscle use [Abdomen Soft] : soft [Abdomen Tenderness] : non-tender [Costovertebral Angle Tenderness] : no ~M costovertebral angle tenderness [Urethral Meatus] : meatus normal [Urinary Bladder Findings] : the bladder was normal on palpation [Scrotum] : the scrotum was normal [Testes Mass (___cm)] : there were no testicular masses [Normal Station and Gait] : the gait and station were normal for the patient's age [] : no rash [No Focal Deficits] : no focal deficits [Oriented To Time, Place, And Person] : oriented to person, place, and time [Affect] : the affect was normal [Mood] : the mood was normal [Not Anxious] : not anxious [No Palpable Adenopathy] : no palpable adenopathy [Abdomen Mass (___ Cm)] : no abdominal mass palpated [Abdomen Hernia] : no hernia was discovered [Penis Abnormality] : normal circumcised penis [Epididymis] : the epididymides were normal [Testes Tenderness] : no tenderness of the testes [Skin Color & Pigmentation] : normal skin color and pigmentation [FreeTextEntry1] : Testicular asymmetry (Lt<Rt)--likely a consequence of an undescended testis managed with hormones.

## 2020-12-07 NOTE — ADDENDUM
[FreeTextEntry1] : A portion of this note was written by [Jordan Mast] on 12/03/2020 acting as a scribe for Dr. Wilder. \par \par I have personally reviewed the chart and agree that the record accurately reflects my personal performance of the history, physical exam, assessment, and plan.

## 2020-12-10 LAB — BACTERIA UR CULT: ABNORMAL

## 2020-12-29 ENCOUNTER — RX RENEWAL (OUTPATIENT)
Age: 75
End: 2020-12-29

## 2021-01-16 ENCOUNTER — APPOINTMENT (OUTPATIENT)
Dept: FAMILY MEDICINE | Facility: CLINIC | Age: 76
End: 2021-01-16

## 2021-03-30 ENCOUNTER — APPOINTMENT (OUTPATIENT)
Dept: FAMILY MEDICINE | Facility: CLINIC | Age: 76
End: 2021-03-30
Payer: MEDICARE

## 2021-03-30 VITALS — SYSTOLIC BLOOD PRESSURE: 140 MMHG | HEART RATE: 64 BPM | RESPIRATION RATE: 16 BRPM | DIASTOLIC BLOOD PRESSURE: 86 MMHG

## 2021-03-30 VITALS
TEMPERATURE: 97.2 F | HEIGHT: 71 IN | DIASTOLIC BLOOD PRESSURE: 86 MMHG | SYSTOLIC BLOOD PRESSURE: 150 MMHG | OXYGEN SATURATION: 96 % | RESPIRATION RATE: 16 BRPM | WEIGHT: 184 LBS | BODY MASS INDEX: 25.76 KG/M2 | HEART RATE: 64 BPM

## 2021-03-30 DIAGNOSIS — R19.5 OTHER FECAL ABNORMALITIES: ICD-10-CM

## 2021-03-30 PROCEDURE — G0444 DEPRESSION SCREEN ANNUAL: CPT | Mod: 59

## 2021-03-30 PROCEDURE — 99214 OFFICE O/P EST MOD 30 MIN: CPT | Mod: 25

## 2021-03-30 NOTE — ASSESSMENT
[FreeTextEntry1] : stool  changes  will  check for o and p otherwise medically stable  continue all meds\par

## 2021-03-30 NOTE — PHYSICAL EXAM
[No Acute Distress] : no acute distress [Normal TMs] : both tympanic membranes were normal [Normal Percussion] : the chest was normal to percussion [Normal Rate] : normal rate  [No Edema] : there was no peripheral edema [Normal] : soft, non-tender, non-distended, no masses palpated, no HSM and normal bowel sounds [Normal Supraclavicular Nodes] : no supraclavicular lymphadenopathy [Normal Posterior Cervical Nodes] : no posterior cervical lymphadenopathy [Normal Anterior Cervical Nodes] : no anterior cervical lymphadenopathy [No CVA Tenderness] : no CVA  tenderness [No Joint Swelling] : no joint swelling [No Rash] : no rash [No Focal Deficits] : no focal deficits [Normal Affect] : the affect was normal

## 2021-03-30 NOTE — HISTORY OF PRESENT ILLNESS
[FreeTextEntry8] : pt c/o seed like  substance in stool  no abd pain and no rectal itching no temp no abd pain ate unusual  food vegetables Saturday for [employer]

## 2021-03-31 LAB — DEPRECATED O AND P PREP STL: NORMAL

## 2021-05-20 ENCOUNTER — APPOINTMENT (OUTPATIENT)
Dept: ORTHOPEDIC SURGERY | Facility: CLINIC | Age: 76
End: 2021-05-20

## 2021-06-07 NOTE — ED PROVIDER NOTE - NEUROLOGICAL, MLM
This prescription was just given 1 week ago and 30 tablets were given. If he is using the medication as prescribed he should have plenty left of this.   If he has already used all 30 tablets of the medication, he needs to be seen for evaluation or have a telephone visit with a provider to discuss his symptoms further.    Alert and oriented, no focal deficits, no motor or sensory deficits.

## 2021-06-08 ENCOUNTER — NON-APPOINTMENT (OUTPATIENT)
Age: 76
End: 2021-06-08

## 2021-06-08 ENCOUNTER — APPOINTMENT (OUTPATIENT)
Dept: ELECTROPHYSIOLOGY | Facility: CLINIC | Age: 76
End: 2021-06-08
Payer: MEDICARE

## 2021-06-08 VITALS
SYSTOLIC BLOOD PRESSURE: 142 MMHG | WEIGHT: 180 LBS | DIASTOLIC BLOOD PRESSURE: 84 MMHG | HEIGHT: 71 IN | OXYGEN SATURATION: 96 % | HEART RATE: 64 BPM | BODY MASS INDEX: 25.2 KG/M2

## 2021-06-08 PROCEDURE — 99215 OFFICE O/P EST HI 40 MIN: CPT

## 2021-06-08 PROCEDURE — 93000 ELECTROCARDIOGRAM COMPLETE: CPT

## 2021-06-08 RX ORDER — DILTIAZEM HYDROCHLORIDE 90 MG/1
TABLET ORAL
Refills: 0 | Status: DISCONTINUED | COMMUNITY
End: 2021-06-08

## 2021-06-08 RX ORDER — NITROFURANTOIN (MONOHYDRATE/MACROCRYSTALS) 25; 75 MG/1; MG/1
100 CAPSULE ORAL
Qty: 10 | Refills: 0 | Status: DISCONTINUED | COMMUNITY
Start: 2020-12-10 | End: 2021-06-08

## 2021-06-08 NOTE — PHYSICAL EXAM
[Well Developed] : well developed [Well Nourished] : well nourished [Normal Conjunctiva] : normal conjunctiva [Normal Venous Pressure] : normal venous pressure [Normal S1, S2] : normal S1, S2 [No Murmur] : no murmur [Clear Lung Fields] : clear lung fields [No Respiratory Distress] : no respiratory distress  [Soft] : abdomen soft [Normal Gait] : normal gait [No Edema] : no edema [No Rash] : no rash [Moves all extremities] : moves all extremities [Alert and Oriented] : alert and oriented

## 2021-06-09 NOTE — DISCUSSION/SUMMARY
[FreeTextEntry1] : 75 year old male with symptomatic persistent atrial fibrillation in the setting of hypertension.  Despite being counseled of his increased risk for stroke with his [id number] of 3 (age > 75 and hypertension) he is followed off oral AC.  He is adamant about an impossibility that he would not have asymptomatic AF despite being educated about the data. His MD has advised him of rapid initiation of AC and urgent cardioversion which has been occurring at a frequency of 1x/12 - 24 months.  We reaffirmed the recommendation for continued TE prophylaxis.  We discussed possible ILR to confirm asymptomatic arrhythmia and also the role of daily ECG checks (AliveCor device).  This device can be used to better identify timing of AF on recurrence which may allow for avoiding a DON for future interventions.  He does have propafenone which he has previously used for chemical cardioversion.  He will continue his metoprolol and dilt.  \par \par We had a very lengthy conversation about the role of ablation to minimize the likelihood of recurrence.  He was counseled that this would NOT change the recommendation for oral AC.  \par \par He will follow up with us in clinic in 6 months.

## 2021-06-09 NOTE — END OF VISIT
[] : Fellow [FreeTextEntry3] : Recommended AC\par Possible ablation\par follow up in 6 months. [Time Spent: ___ minutes] : I have spent [unfilled] minutes of time on the encounter.

## 2021-06-09 NOTE — REVIEW OF SYSTEMS
[Fever] : no fever [Earache] : no earache [Sore Throat] : no sore throat [SOB] : no shortness of breath [Chest Discomfort] : no chest discomfort [Syncope] : no syncope [Rash] : no rash [Dizziness] : no dizziness [Confusion] : no confusion was observed

## 2021-06-09 NOTE — HISTORY OF PRESENT ILLNESS
[FreeTextEntry1] : 75 year old male with past medical history of hypertension, hyperlipidemia,  paroxysmal atrial fibrillation is here for follow up. He was last seen in clinic on April 15,2014. States he has been having sporadic episodes of atrial fibrillation since he was 20 years old. States he can feel when he is atrial fibrillation. Initially he was having episodes of atrial fibrillation every couple of years but now he is experiencing once every year. His last episode of atrial fibrillation was on 5/21/21 requiring DON cardioversion on 5/27/21. Prior episode of atrial fibrillation was about a year ago.  His symptoms in atrial fibrillation include palpitations. His cardiologist is Dr Barker and has been seen by Electrophysiologist at [facility] in the past. He is opposed to long term anticoagulation for stroke prevention. States he takes apixaban for short term  when he goes into atrial fibrillation. \par

## 2021-06-09 NOTE — CARDIOLOGY SUMMARY
[de-identified] : Sinsu rhythm, HR 65, QTc of 386 msec [de-identified] : Stress echo 3/3/2021, exercise on mariluz protocol for 9 minutes achieving 85 % of MPHR. No evidence \par of ischemic on EKG/Echo. [de-identified] : 3/3/2021\par Normal LV systolic function with EF of 55 %.\par Normal RV size and systolic function.\par No significant valvular abnormalities.\par Mild left atrial dilatation

## 2021-06-22 ENCOUNTER — APPOINTMENT (OUTPATIENT)
Dept: CARDIOLOGY | Facility: CLINIC | Age: 76
End: 2021-06-22

## 2021-07-29 ENCOUNTER — OUTPATIENT (OUTPATIENT)
Dept: OUTPATIENT SERVICES | Facility: HOSPITAL | Age: 76
LOS: 1 days | End: 2021-07-29
Payer: MEDICARE

## 2021-07-29 ENCOUNTER — NON-APPOINTMENT (OUTPATIENT)
Age: 76
End: 2021-07-29

## 2021-07-29 ENCOUNTER — TRANSCRIPTION ENCOUNTER (OUTPATIENT)
Age: 76
End: 2021-07-29

## 2021-07-29 ENCOUNTER — APPOINTMENT (OUTPATIENT)
Dept: ELECTROPHYSIOLOGY | Facility: CLINIC | Age: 76
End: 2021-07-29
Payer: MEDICARE

## 2021-07-29 VITALS
RESPIRATION RATE: 17 BRPM | OXYGEN SATURATION: 95 % | DIASTOLIC BLOOD PRESSURE: 60 MMHG | SYSTOLIC BLOOD PRESSURE: 121 MMHG | HEART RATE: 65 BPM

## 2021-07-29 VITALS
HEART RATE: 108 BPM | WEIGHT: 179.02 LBS | OXYGEN SATURATION: 96 % | RESPIRATION RATE: 16 BRPM | HEIGHT: 71 IN | DIASTOLIC BLOOD PRESSURE: 82 MMHG | TEMPERATURE: 98 F | SYSTOLIC BLOOD PRESSURE: 139 MMHG

## 2021-07-29 VITALS
WEIGHT: 179 LBS | HEIGHT: 71 IN | SYSTOLIC BLOOD PRESSURE: 133 MMHG | DIASTOLIC BLOOD PRESSURE: 84 MMHG | HEART RATE: 91 BPM | BODY MASS INDEX: 25.06 KG/M2 | OXYGEN SATURATION: 96 %

## 2021-07-29 DIAGNOSIS — Z90.79 ACQUIRED ABSENCE OF OTHER GENITAL ORGAN(S): Chronic | ICD-10-CM

## 2021-07-29 DIAGNOSIS — I48.0 PAROXYSMAL ATRIAL FIBRILLATION: ICD-10-CM

## 2021-07-29 LAB
ALBUMIN SERPL ELPH-MCNC: 4.1 G/DL — SIGNIFICANT CHANGE UP (ref 3.3–5)
ALP SERPL-CCNC: 83 U/L — SIGNIFICANT CHANGE UP (ref 40–120)
ALT FLD-CCNC: 29 U/L — SIGNIFICANT CHANGE UP (ref 10–45)
ANION GAP SERPL CALC-SCNC: 9 MMOL/L — SIGNIFICANT CHANGE UP (ref 5–17)
APTT BLD: 34.3 SEC — SIGNIFICANT CHANGE UP (ref 27.5–35.5)
AST SERPL-CCNC: 22 U/L — SIGNIFICANT CHANGE UP (ref 10–40)
BILIRUB SERPL-MCNC: 0.4 MG/DL — SIGNIFICANT CHANGE UP (ref 0.2–1.2)
BUN SERPL-MCNC: 27 MG/DL — HIGH (ref 7–23)
CALCIUM SERPL-MCNC: 10.8 MG/DL — HIGH (ref 8.4–10.5)
CHLORIDE SERPL-SCNC: 108 MMOL/L — SIGNIFICANT CHANGE UP (ref 96–108)
CO2 SERPL-SCNC: 23 MMOL/L — SIGNIFICANT CHANGE UP (ref 22–31)
CREAT SERPL-MCNC: 1.15 MG/DL — SIGNIFICANT CHANGE UP (ref 0.5–1.3)
GLUCOSE SERPL-MCNC: 112 MG/DL — HIGH (ref 70–99)
HCT VFR BLD CALC: 48.9 % — SIGNIFICANT CHANGE UP (ref 39–50)
HGB BLD-MCNC: 16.3 G/DL — SIGNIFICANT CHANGE UP (ref 13–17)
INR BLD: 1.1 RATIO — SIGNIFICANT CHANGE UP (ref 0.88–1.16)
MCHC RBC-ENTMCNC: 31.8 PG — SIGNIFICANT CHANGE UP (ref 27–34)
MCHC RBC-ENTMCNC: 33.3 GM/DL — SIGNIFICANT CHANGE UP (ref 32–36)
MCV RBC AUTO: 95.5 FL — SIGNIFICANT CHANGE UP (ref 80–100)
NRBC # BLD: 0 /100 WBCS — SIGNIFICANT CHANGE UP (ref 0–0)
PLATELET # BLD AUTO: 245 K/UL — SIGNIFICANT CHANGE UP (ref 150–400)
POTASSIUM SERPL-MCNC: 4.6 MMOL/L — SIGNIFICANT CHANGE UP (ref 3.5–5.3)
POTASSIUM SERPL-SCNC: 4.6 MMOL/L — SIGNIFICANT CHANGE UP (ref 3.5–5.3)
PROT SERPL-MCNC: 7.3 G/DL — SIGNIFICANT CHANGE UP (ref 6–8.3)
PROTHROM AB SERPL-ACNC: 13.1 SEC — SIGNIFICANT CHANGE UP (ref 10.6–13.6)
RBC # BLD: 5.12 M/UL — SIGNIFICANT CHANGE UP (ref 4.2–5.8)
RBC # FLD: 14 % — SIGNIFICANT CHANGE UP (ref 10.3–14.5)
SODIUM SERPL-SCNC: 140 MMOL/L — SIGNIFICANT CHANGE UP (ref 135–145)
WBC # BLD: 7.65 K/UL — SIGNIFICANT CHANGE UP (ref 3.8–10.5)
WBC # FLD AUTO: 7.65 K/UL — SIGNIFICANT CHANGE UP (ref 3.8–10.5)

## 2021-07-29 PROCEDURE — 92960 CARDIOVERSION ELECTRIC EXT: CPT

## 2021-07-29 PROCEDURE — 93000 ELECTROCARDIOGRAM COMPLETE: CPT | Mod: PD

## 2021-07-29 PROCEDURE — 93010 ELECTROCARDIOGRAM REPORT: CPT

## 2021-07-29 PROCEDURE — 99213 OFFICE O/P EST LOW 20 MIN: CPT

## 2021-07-29 RX ORDER — ATORVASTATIN CALCIUM 40 MG/1
40 TABLET, FILM COATED ORAL
Refills: 0 | Status: DISCONTINUED | COMMUNITY
End: 2021-07-29

## 2021-07-29 RX ORDER — SODIUM CHLORIDE 9 MG/ML
3 INJECTION INTRAMUSCULAR; INTRAVENOUS; SUBCUTANEOUS EVERY 8 HOURS
Refills: 0 | Status: DISCONTINUED | OUTPATIENT
Start: 2021-07-29 | End: 2021-08-12

## 2021-07-29 NOTE — ASU PREOP CHECKLIST - WAS PATIENT ON BETA BLOCKER?
Airway  Urgency: elective    Airway not difficult    General Information and Staff    Patient location during procedure: OR  Anesthesiologist: NURYS BASSETT  CRNA: JAZZ ORO    Indications and Patient Condition  Indications for airway management: airway protection    Preoxygenated: yes  Mask difficulty assessment: 1 - vent by mask    Final Airway Details  Final airway type: supraglottic airway      Successful airway: unique  Size 4    Number of attempts at approach: 1    Additional Comments  Smooth IV/mask induction and placement of LMA. Atraumatic, lips/teeth/mouth intact, as preop. +ETCO2, bilateral breath sounds and equal.              Yes

## 2021-07-29 NOTE — CARDIOLOGY SUMMARY
[de-identified] : Sinsu rhythm, HR 65, QTc of 386 msec [de-identified] : artem shows SR on 7/28 11:50 am, but AF this AM at 6:30 am [de-identified] : Stress echo 3/3/2021, exercise on mariluz protocol for 9 minutes achieving 85 % of MPHR. No evidence \par of ischemic on EKG/Echo. [de-identified] : 3/3/2021\par Normal LV systolic function with EF of 55 %.\par Normal RV size and systolic function.\par No significant valvular abnormalities.\par Mild left atrial dilatation

## 2021-07-29 NOTE — DISCUSSION/SUMMARY
[FreeTextEntry1] : History of persistent AF, now back in AF for < 12 hours by history, < 18 by CroquetteLanda brina.  He has resumed AC.  We discussed options for therapy.  We will plan for DCCV today.  He will take his AC for 4 weeks, but understands that my continued recommendation is longterm AC.  \par

## 2021-07-29 NOTE — HISTORY OF PRESENT ILLNESS
[FreeTextEntry1] : As noted this AM he awoke and felt the onset of AF.  He did have some wine last night.  Ramon shows SR on 7/28 11:50 am, but AF this AM at 6:30 am.  No chest pain.  No shortness of breath. No lightheadedness/dizziness.  It is not as bad as it usually is.  He took his Eliquis at 6 AM.

## 2021-07-29 NOTE — PRE-ANESTHESIA EVALUATION ADULT - NSANTHTIREDRD_ENT_A_CORE
[FreeTextEntry1] : Documented by Neftali Lara acting as a scribe for Dr. Dawit Sanders on 2/8/2019 No

## 2021-07-29 NOTE — PHYSICAL EXAM

## 2021-07-30 ENCOUNTER — TRANSCRIPTION ENCOUNTER (OUTPATIENT)
Age: 76
End: 2021-07-30

## 2021-07-31 ENCOUNTER — INPATIENT (INPATIENT)
Facility: HOSPITAL | Age: 76
LOS: 3 days | Discharge: HOME | DRG: 853 | End: 2021-08-04
Attending: INTERNAL MEDICINE | Admitting: INTERNAL MEDICINE
Payer: MEDICARE

## 2021-07-31 VITALS
SYSTOLIC BLOOD PRESSURE: 142 MMHG | WEIGHT: 179.9 LBS | RESPIRATION RATE: 20 BRPM | HEIGHT: 71 IN | DIASTOLIC BLOOD PRESSURE: 77 MMHG | HEART RATE: 71 BPM | TEMPERATURE: 98 F | OXYGEN SATURATION: 96 %

## 2021-07-31 DIAGNOSIS — C61 MALIGNANT NEOPLASM OF PROSTATE: ICD-10-CM

## 2021-07-31 DIAGNOSIS — N20.0 CALCULUS OF KIDNEY: ICD-10-CM

## 2021-07-31 DIAGNOSIS — Z90.79 ACQUIRED ABSENCE OF OTHER GENITAL ORGAN(S): Chronic | ICD-10-CM

## 2021-07-31 DIAGNOSIS — I48.0 PAROXYSMAL ATRIAL FIBRILLATION: ICD-10-CM

## 2021-07-31 LAB
ALBUMIN SERPL ELPH-MCNC: 3 G/DL — LOW (ref 3.3–5)
ALBUMIN SERPL ELPH-MCNC: 4 G/DL — SIGNIFICANT CHANGE UP (ref 3.3–5)
ALP SERPL-CCNC: 55 U/L — SIGNIFICANT CHANGE UP (ref 40–120)
ALP SERPL-CCNC: 79 U/L — SIGNIFICANT CHANGE UP (ref 40–120)
ALT FLD-CCNC: 23 U/L — SIGNIFICANT CHANGE UP (ref 10–45)
ALT FLD-CCNC: 29 U/L — SIGNIFICANT CHANGE UP (ref 10–45)
ANION GAP SERPL CALC-SCNC: 11 MMOL/L — SIGNIFICANT CHANGE UP (ref 5–17)
ANION GAP SERPL CALC-SCNC: 12 MMOL/L — SIGNIFICANT CHANGE UP (ref 5–17)
APPEARANCE UR: CLEAR — SIGNIFICANT CHANGE UP
APTT BLD: 24.9 SEC — LOW (ref 27.5–35.5)
AST SERPL-CCNC: 22 U/L — SIGNIFICANT CHANGE UP (ref 10–40)
AST SERPL-CCNC: 26 U/L — SIGNIFICANT CHANGE UP (ref 10–40)
BACTERIA # UR AUTO: ABNORMAL
BASE EXCESS BLDV CALC-SCNC: 3.2 MMOL/L — HIGH (ref -2–2)
BASOPHILS # BLD AUTO: 0.08 K/UL — SIGNIFICANT CHANGE UP (ref 0–0.2)
BASOPHILS NFR BLD AUTO: 0.9 % — SIGNIFICANT CHANGE UP (ref 0–2)
BILIRUB SERPL-MCNC: 0.4 MG/DL — SIGNIFICANT CHANGE UP (ref 0.2–1.2)
BILIRUB SERPL-MCNC: 0.7 MG/DL — SIGNIFICANT CHANGE UP (ref 0.2–1.2)
BILIRUB UR-MCNC: NEGATIVE — SIGNIFICANT CHANGE UP
BLD GP AB SCN SERPL QL: NEGATIVE — SIGNIFICANT CHANGE UP
BUN SERPL-MCNC: 33 MG/DL — HIGH (ref 7–23)
BUN SERPL-MCNC: 39 MG/DL — HIGH (ref 7–23)
CA-I SERPL-SCNC: 1.27 MMOL/L — SIGNIFICANT CHANGE UP (ref 1.12–1.3)
CALCIUM SERPL-MCNC: 10.5 MG/DL — SIGNIFICANT CHANGE UP (ref 8.4–10.5)
CALCIUM SERPL-MCNC: 9.2 MG/DL — SIGNIFICANT CHANGE UP (ref 8.4–10.5)
CHLORIDE BLDV-SCNC: 108 MMOL/L — SIGNIFICANT CHANGE UP (ref 96–108)
CHLORIDE SERPL-SCNC: 104 MMOL/L — SIGNIFICANT CHANGE UP (ref 96–108)
CHLORIDE SERPL-SCNC: 108 MMOL/L — SIGNIFICANT CHANGE UP (ref 96–108)
CO2 BLDV-SCNC: 28 MMOL/L — SIGNIFICANT CHANGE UP (ref 22–30)
CO2 SERPL-SCNC: 18 MMOL/L — LOW (ref 22–31)
CO2 SERPL-SCNC: 25 MMOL/L — SIGNIFICANT CHANGE UP (ref 22–31)
COLOR SPEC: YELLOW — SIGNIFICANT CHANGE UP
CREAT SERPL-MCNC: 1.51 MG/DL — HIGH (ref 0.5–1.3)
CREAT SERPL-MCNC: 1.78 MG/DL — HIGH (ref 0.5–1.3)
DIFF PNL FLD: ABNORMAL
EOSINOPHIL # BLD AUTO: 0.15 K/UL — SIGNIFICANT CHANGE UP (ref 0–0.5)
EOSINOPHIL NFR BLD AUTO: 1.7 % — SIGNIFICANT CHANGE UP (ref 0–6)
EPI CELLS # UR: 0 /HPF — SIGNIFICANT CHANGE UP
GAS PNL BLDA: SIGNIFICANT CHANGE UP
GAS PNL BLDV: 137 MMOL/L — SIGNIFICANT CHANGE UP (ref 135–145)
GAS PNL BLDV: SIGNIFICANT CHANGE UP
GAS PNL BLDV: SIGNIFICANT CHANGE UP
GLUCOSE BLDV-MCNC: 121 MG/DL — HIGH (ref 70–99)
GLUCOSE SERPL-MCNC: 122 MG/DL — HIGH (ref 70–99)
GLUCOSE SERPL-MCNC: 136 MG/DL — HIGH (ref 70–99)
GLUCOSE UR QL: NEGATIVE — SIGNIFICANT CHANGE UP
HCO3 BLDV-SCNC: 27 MMOL/L — SIGNIFICANT CHANGE UP (ref 21–29)
HCT VFR BLD CALC: 40.5 % — SIGNIFICANT CHANGE UP (ref 39–50)
HCT VFR BLD CALC: 46.4 % — SIGNIFICANT CHANGE UP (ref 39–50)
HCT VFR BLDA CALC: 50 % — SIGNIFICANT CHANGE UP (ref 39–50)
HGB BLD CALC-MCNC: 16.2 G/DL — SIGNIFICANT CHANGE UP (ref 13–17)
HGB BLD-MCNC: 13.3 G/DL — SIGNIFICANT CHANGE UP (ref 13–17)
HGB BLD-MCNC: 15.3 G/DL — SIGNIFICANT CHANGE UP (ref 13–17)
HYALINE CASTS # UR AUTO: 4 /LPF — HIGH (ref 0–2)
INR BLD: 1.24 RATIO — HIGH (ref 0.88–1.16)
KETONES UR-MCNC: ABNORMAL
LACTATE BLDV-MCNC: 1.7 MMOL/L — SIGNIFICANT CHANGE UP (ref 0.7–2)
LEUKOCYTE ESTERASE UR-ACNC: ABNORMAL
LYMPHOCYTES # BLD AUTO: 0.23 K/UL — LOW (ref 1–3.3)
LYMPHOCYTES # BLD AUTO: 2.6 % — LOW (ref 13–44)
MAGNESIUM SERPL-MCNC: 1.9 MG/DL — SIGNIFICANT CHANGE UP (ref 1.6–2.6)
MANUAL SMEAR VERIFICATION: SIGNIFICANT CHANGE UP
MCHC RBC-ENTMCNC: 31.4 PG — SIGNIFICANT CHANGE UP (ref 27–34)
MCHC RBC-ENTMCNC: 31.5 PG — SIGNIFICANT CHANGE UP (ref 27–34)
MCHC RBC-ENTMCNC: 32.8 GM/DL — SIGNIFICANT CHANGE UP (ref 32–36)
MCHC RBC-ENTMCNC: 33 GM/DL — SIGNIFICANT CHANGE UP (ref 32–36)
MCV RBC AUTO: 95.5 FL — SIGNIFICANT CHANGE UP (ref 80–100)
MCV RBC AUTO: 95.7 FL — SIGNIFICANT CHANGE UP (ref 80–100)
METAMYELOCYTES # FLD: 0.9 % — HIGH (ref 0–0)
MONOCYTES # BLD AUTO: 0 K/UL — SIGNIFICANT CHANGE UP (ref 0–0.9)
MONOCYTES NFR BLD AUTO: 0 % — LOW (ref 2–14)
NEUTROPHILS # BLD AUTO: 8.19 K/UL — HIGH (ref 1.8–7.4)
NEUTROPHILS NFR BLD AUTO: 91.3 % — HIGH (ref 43–77)
NEUTS BAND # BLD: 2.6 % — SIGNIFICANT CHANGE UP (ref 0–8)
NITRITE UR-MCNC: POSITIVE
NRBC # BLD: 0 /100 WBCS — SIGNIFICANT CHANGE UP (ref 0–0)
PCO2 BLDV: 41 MMHG — SIGNIFICANT CHANGE UP (ref 35–50)
PH BLDV: 7.44 — SIGNIFICANT CHANGE UP (ref 7.35–7.45)
PH UR: 6 — SIGNIFICANT CHANGE UP (ref 5–8)
PHOSPHATE SERPL-MCNC: 2.7 MG/DL — SIGNIFICANT CHANGE UP (ref 2.5–4.5)
PLAT MORPH BLD: NORMAL — SIGNIFICANT CHANGE UP
PLATELET # BLD AUTO: 178 K/UL — SIGNIFICANT CHANGE UP (ref 150–400)
PLATELET # BLD AUTO: 214 K/UL — SIGNIFICANT CHANGE UP (ref 150–400)
PO2 BLDV: 30 MMHG — SIGNIFICANT CHANGE UP (ref 25–45)
POTASSIUM BLDV-SCNC: 4 MMOL/L — SIGNIFICANT CHANGE UP (ref 3.5–5.3)
POTASSIUM SERPL-MCNC: 3.9 MMOL/L — SIGNIFICANT CHANGE UP (ref 3.5–5.3)
POTASSIUM SERPL-MCNC: 4.3 MMOL/L — SIGNIFICANT CHANGE UP (ref 3.5–5.3)
POTASSIUM SERPL-SCNC: 3.9 MMOL/L — SIGNIFICANT CHANGE UP (ref 3.5–5.3)
POTASSIUM SERPL-SCNC: 4.3 MMOL/L — SIGNIFICANT CHANGE UP (ref 3.5–5.3)
PROT SERPL-MCNC: 5.9 G/DL — LOW (ref 6–8.3)
PROT SERPL-MCNC: 7 G/DL — SIGNIFICANT CHANGE UP (ref 6–8.3)
PROT UR-MCNC: ABNORMAL
PROTHROM AB SERPL-ACNC: 14.7 SEC — HIGH (ref 10.6–13.6)
RBC # BLD: 4.24 M/UL — SIGNIFICANT CHANGE UP (ref 4.2–5.8)
RBC # BLD: 4.85 M/UL — SIGNIFICANT CHANGE UP (ref 4.2–5.8)
RBC # FLD: 13.7 % — SIGNIFICANT CHANGE UP (ref 10.3–14.5)
RBC # FLD: 14.4 % — SIGNIFICANT CHANGE UP (ref 10.3–14.5)
RBC BLD AUTO: NORMAL — SIGNIFICANT CHANGE UP
RBC CASTS # UR COMP ASSIST: 4 /HPF — SIGNIFICANT CHANGE UP (ref 0–4)
RH IG SCN BLD-IMP: POSITIVE — SIGNIFICANT CHANGE UP
RH IG SCN BLD-IMP: POSITIVE — SIGNIFICANT CHANGE UP
SAO2 % BLDV: 50 % — LOW (ref 67–88)
SARS-COV-2 RNA SPEC QL NAA+PROBE: SIGNIFICANT CHANGE UP
SODIUM SERPL-SCNC: 138 MMOL/L — SIGNIFICANT CHANGE UP (ref 135–145)
SODIUM SERPL-SCNC: 140 MMOL/L — SIGNIFICANT CHANGE UP (ref 135–145)
SP GR SPEC: 1.02 — SIGNIFICANT CHANGE UP (ref 1.01–1.02)
UROBILINOGEN FLD QL: NEGATIVE — SIGNIFICANT CHANGE UP
WBC # BLD: 26.42 K/UL — HIGH (ref 3.8–10.5)
WBC # BLD: 8.72 K/UL — SIGNIFICANT CHANGE UP (ref 3.8–10.5)
WBC # FLD AUTO: 26.42 K/UL — HIGH (ref 3.8–10.5)
WBC # FLD AUTO: 8.72 K/UL — SIGNIFICANT CHANGE UP (ref 3.8–10.5)
WBC UR QL: 113 /HPF — HIGH (ref 0–5)

## 2021-07-31 PROCEDURE — 99291 CRITICAL CARE FIRST HOUR: CPT

## 2021-07-31 PROCEDURE — 99285 EMERGENCY DEPT VISIT HI MDM: CPT

## 2021-07-31 PROCEDURE — 74177 CT ABD & PELVIS W/CONTRAST: CPT | Mod: 26,MA

## 2021-07-31 PROCEDURE — 71045 X-RAY EXAM CHEST 1 VIEW: CPT | Mod: 26

## 2021-07-31 PROCEDURE — 93308 TTE F-UP OR LMTD: CPT | Mod: 26,GC

## 2021-07-31 PROCEDURE — 99222 1ST HOSP IP/OBS MODERATE 55: CPT

## 2021-07-31 RX ORDER — PSYLLIUM SEED (WITH DEXTROSE)
1 POWDER (GRAM) ORAL
Qty: 0 | Refills: 0 | DISCHARGE

## 2021-07-31 RX ORDER — HYDROMORPHONE HYDROCHLORIDE 2 MG/ML
0.25 INJECTION INTRAMUSCULAR; INTRAVENOUS; SUBCUTANEOUS
Refills: 0 | Status: DISCONTINUED | OUTPATIENT
Start: 2021-07-31 | End: 2021-07-31

## 2021-07-31 RX ORDER — APIXABAN 2.5 MG/1
5 TABLET, FILM COATED ORAL EVERY 12 HOURS
Refills: 0 | Status: DISCONTINUED | OUTPATIENT
Start: 2021-08-01 | End: 2021-08-04

## 2021-07-31 RX ORDER — APIXABAN 2.5 MG/1
5 TABLET, FILM COATED ORAL EVERY 12 HOURS
Refills: 0 | Status: DISCONTINUED | OUTPATIENT
Start: 2021-07-31 | End: 2021-07-31

## 2021-07-31 RX ORDER — METOPROLOL TARTRATE 50 MG
100 TABLET ORAL DAILY
Refills: 0 | Status: DISCONTINUED | OUTPATIENT
Start: 2021-07-31 | End: 2021-07-31

## 2021-07-31 RX ORDER — ONDANSETRON 8 MG/1
4 TABLET, FILM COATED ORAL ONCE
Refills: 0 | Status: COMPLETED | OUTPATIENT
Start: 2021-07-31 | End: 2021-07-31

## 2021-07-31 RX ORDER — SODIUM CHLORIDE 9 MG/ML
1000 INJECTION, SOLUTION INTRAVENOUS ONCE
Refills: 0 | Status: COMPLETED | OUTPATIENT
Start: 2021-07-31 | End: 2021-07-31

## 2021-07-31 RX ORDER — HEPARIN SODIUM 5000 [USP'U]/ML
5000 INJECTION INTRAVENOUS; SUBCUTANEOUS ONCE
Refills: 0 | Status: COMPLETED | OUTPATIENT
Start: 2021-07-31 | End: 2021-07-31

## 2021-07-31 RX ORDER — PANTOPRAZOLE SODIUM 20 MG/1
40 TABLET, DELAYED RELEASE ORAL
Refills: 0 | Status: DISCONTINUED | OUTPATIENT
Start: 2021-07-31 | End: 2021-08-04

## 2021-07-31 RX ORDER — CEFTRIAXONE 500 MG/1
1000 INJECTION, POWDER, FOR SOLUTION INTRAMUSCULAR; INTRAVENOUS ONCE
Refills: 0 | Status: COMPLETED | OUTPATIENT
Start: 2021-07-31 | End: 2021-07-31

## 2021-07-31 RX ORDER — PIPERACILLIN AND TAZOBACTAM 4; .5 G/20ML; G/20ML
3.38 INJECTION, POWDER, LYOPHILIZED, FOR SOLUTION INTRAVENOUS EVERY 8 HOURS
Refills: 0 | Status: DISCONTINUED | OUTPATIENT
Start: 2021-07-31 | End: 2021-08-04

## 2021-07-31 RX ORDER — HYDROMORPHONE HYDROCHLORIDE 2 MG/ML
0.25 INJECTION INTRAMUSCULAR; INTRAVENOUS; SUBCUTANEOUS
Refills: 0 | Status: DISCONTINUED | OUTPATIENT
Start: 2021-07-31 | End: 2021-08-04

## 2021-07-31 RX ORDER — DILTIAZEM HCL 120 MG
180 CAPSULE, EXT RELEASE 24 HR ORAL DAILY
Refills: 0 | Status: DISCONTINUED | OUTPATIENT
Start: 2021-07-31 | End: 2021-07-31

## 2021-07-31 RX ORDER — ACETAMINOPHEN 500 MG
975 TABLET ORAL ONCE
Refills: 0 | Status: COMPLETED | OUTPATIENT
Start: 2021-07-31 | End: 2021-07-31

## 2021-07-31 RX ORDER — CHLORHEXIDINE GLUCONATE 213 G/1000ML
1 SOLUTION TOPICAL
Refills: 0 | Status: DISCONTINUED | OUTPATIENT
Start: 2021-07-31 | End: 2021-08-04

## 2021-07-31 RX ORDER — MIDODRINE HYDROCHLORIDE 2.5 MG/1
10 TABLET ORAL EVERY 8 HOURS
Refills: 0 | Status: DISCONTINUED | OUTPATIENT
Start: 2021-07-31 | End: 2021-08-02

## 2021-07-31 RX ORDER — ONDANSETRON 8 MG/1
4 TABLET, FILM COATED ORAL EVERY 6 HOURS
Refills: 0 | Status: DISCONTINUED | OUTPATIENT
Start: 2021-07-31 | End: 2021-07-31

## 2021-07-31 RX ORDER — DOCUSATE SODIUM 100 MG
1 CAPSULE ORAL
Qty: 0 | Refills: 0 | DISCHARGE

## 2021-07-31 RX ORDER — SODIUM CHLORIDE 9 MG/ML
1000 INJECTION, SOLUTION INTRAVENOUS
Refills: 0 | Status: COMPLETED | OUTPATIENT
Start: 2021-07-31 | End: 2021-07-31

## 2021-07-31 RX ORDER — CEFTRIAXONE 500 MG/1
1000 INJECTION, POWDER, FOR SOLUTION INTRAMUSCULAR; INTRAVENOUS EVERY 24 HOURS
Refills: 0 | Status: DISCONTINUED | OUTPATIENT
Start: 2021-07-31 | End: 2021-07-31

## 2021-07-31 RX ORDER — OMEPRAZOLE 10 MG/1
1 CAPSULE, DELAYED RELEASE ORAL
Qty: 0 | Refills: 0 | DISCHARGE

## 2021-07-31 RX ORDER — KETOROLAC TROMETHAMINE 30 MG/ML
15 SYRINGE (ML) INJECTION ONCE
Refills: 0 | Status: DISCONTINUED | OUTPATIENT
Start: 2021-07-31 | End: 2021-07-31

## 2021-07-31 RX ORDER — HEPARIN SODIUM 5000 [USP'U]/ML
5000 INJECTION INTRAVENOUS; SUBCUTANEOUS EVERY 8 HOURS
Refills: 0 | Status: DISCONTINUED | OUTPATIENT
Start: 2021-07-31 | End: 2021-07-31

## 2021-07-31 RX ORDER — PHENYLEPHRINE HYDROCHLORIDE 10 MG/ML
1 INJECTION INTRAVENOUS
Qty: 40 | Refills: 0 | Status: DISCONTINUED | OUTPATIENT
Start: 2021-07-31 | End: 2021-08-01

## 2021-07-31 RX ORDER — MORPHINE SULFATE 50 MG/1
4 CAPSULE, EXTENDED RELEASE ORAL ONCE
Refills: 0 | Status: DISCONTINUED | OUTPATIENT
Start: 2021-07-31 | End: 2021-07-31

## 2021-07-31 RX ADMIN — Medication 975 MILLIGRAM(S): at 05:21

## 2021-07-31 RX ADMIN — CHLORHEXIDINE GLUCONATE 1 APPLICATION(S): 213 SOLUTION TOPICAL at 17:52

## 2021-07-31 RX ADMIN — SODIUM CHLORIDE 1000 MILLILITER(S): 9 INJECTION, SOLUTION INTRAVENOUS at 14:15

## 2021-07-31 RX ADMIN — ONDANSETRON 4 MILLIGRAM(S): 8 TABLET, FILM COATED ORAL at 03:31

## 2021-07-31 RX ADMIN — HEPARIN SODIUM 5000 UNIT(S): 5000 INJECTION INTRAVENOUS; SUBCUTANEOUS at 22:26

## 2021-07-31 RX ADMIN — SODIUM CHLORIDE 75 MILLILITER(S): 9 INJECTION, SOLUTION INTRAVENOUS at 17:50

## 2021-07-31 RX ADMIN — SODIUM CHLORIDE 1000 MILLILITER(S): 9 INJECTION, SOLUTION INTRAVENOUS at 14:51

## 2021-07-31 RX ADMIN — PHENYLEPHRINE HYDROCHLORIDE 30.6 MICROGRAM(S)/KG/MIN: 10 INJECTION INTRAVENOUS at 22:26

## 2021-07-31 RX ADMIN — PHENYLEPHRINE HYDROCHLORIDE 30.6 MICROGRAM(S)/KG/MIN: 10 INJECTION INTRAVENOUS at 17:50

## 2021-07-31 RX ADMIN — CEFTRIAXONE 100 MILLIGRAM(S): 500 INJECTION, POWDER, FOR SOLUTION INTRAMUSCULAR; INTRAVENOUS at 05:21

## 2021-07-31 RX ADMIN — MIDODRINE HYDROCHLORIDE 10 MILLIGRAM(S): 2.5 TABLET ORAL at 17:45

## 2021-07-31 RX ADMIN — PHENYLEPHRINE HYDROCHLORIDE 30.6 MICROGRAM(S)/KG/MIN: 10 INJECTION INTRAVENOUS at 10:59

## 2021-07-31 NOTE — CONSULT NOTE ADULT - SUBJECTIVE AND OBJECTIVE BOX
HPI:   75 y/o M with PMH of afib on eliquis (had cardioversion yesterday), prostate ca s/p resection presenting with L flank pain that started in the evening. Patient states pain radiates to his groin. Has associated n/v. States pain is similar to stones in past. Denies fever, chills at home, in ED spiked to 103.2 and became agitated. No fevers, chills, cp, sob, LE swelling    PAST MEDICAL & SURGICAL HISTORY:  Paroxysmal atrial fibrillation    Prostate cancer    History of robot-assisted laparoscopic radical prostatectomy      MEDICATIONS  (STANDING):    MEDICATIONS  (PRN):    FAMILY HISTORY:  No pertinent family history in first degree relatives      Allergies    quinidine (Other)  sulfa drugs (Other)    Intolerances      SOCIAL HISTORY:   Tobacco hx:    REVIEW OF SYSTEMS: Pertinent positives and negatives as stated in HPI, otherwise negative    Vital signs  T(C): 39.6, Max: 39.6 ( @ 05:15)  HR: 100  BP: 104/68  SpO2: 95%    Output    UOP    Physical Exam  Gen: NAD, was able to stated his name, place and day of the week.   Pulm: No respiratory distress, no subcostal retractions  CV: RRR, no JVD  Abd: Soft, NT, ND  : NO CVA tenderness   MSK: No edema present    LABS:     @ 03:25    Nitrite: Positive     @ 11:41    WBC 7.65  / Hct 48.9  / SCr 1.15         140  |  104  |  39<H>  ----------------------------<  122<H>  4.3   |  25  |  1.78<H>    Ca    10.5      2021 03:25    TPro  7.0  /  Alb  4.0  /  TBili  0.7  /  DBili  x   /  AST  22  /  ALT  29  /  AlkPhos  79      PT/INR - ( 2021 12:20 )   PT: 13.1 sec;   INR: 1.10 ratio         PTT - ( 2021 12:20 )  PTT:34.3 sec  Urinalysis Basic - ( 2021 03:48 )    Color: Yellow / Appearance: Clear / S.019 / pH: x  Gluc: x / Ketone: Small  / Bili: Negative / Urobili: Negative   Blood: x / Protein: 30 mg/dL / Nitrite: Positive   Leuk Esterase: Large / RBC: 4 /hpf /  /HPF   Sq Epi: x / Non Sq Epi: 0 /hpf / Bacteria: Many            Urine Cx:   Blood Cx:    RADIOLOGY:  < from: CT Abdomen and Pelvis w/ IV Cont (21 @ 04:20) >  Mild to moderate left hydroureter to the level of an obstructing 8 mm calculusin the proximal left ureter.    < end of copied text >

## 2021-07-31 NOTE — H&P ADULT - NSHPREVIEWOFSYSTEMS_GEN_ALL_CORE
REVIEW OF SYSTEMS:    CONSTITUTIONAL: No weakness, +fevers + chills  EYES/ENT: No visual changes;  No vertigo or throat pain   NECK: No pain or stiffness  RESPIRATORY: No cough, wheezing, hemoptysis; No shortness of breath  CARDIOVASCULAR: No chest pain or palpitations  GASTROINTESTINAL: L sided abdominal pain. + nausea, + vomiting No diarrhea or constipation. No melena or hematochezia.  GENITOURINARY: No dysuria, frequency or hematuria  NEUROLOGICAL: No numbness or weakness  SKIN: No itching, burning, rashes, or lesions   All other review of systems is negative unless indicated above.

## 2021-07-31 NOTE — PATIENT PROFILE ADULT - FALL HARM RISK TYPE OF ASSESSMENT
The patient has been given discharge instructions and discharged by ED Provider, the patient is in no acute distress at the time of discharge. The patient is alert and oriented x3, skin warm and dry at the time of discharge. Patient understands discharge instructions and has no further questions at the time of discharge. The patients  condition is stable at the time of discharge.  The patients condition has improved from the time of arrival. admission

## 2021-07-31 NOTE — CHART NOTE - NSCHARTNOTEFT_GEN_A_CORE
: Sorin Castillo      INDICATION: Septic Shock, Recent cardioversion      PROCEDURE:    [x ] LIMITED ECHO    [ x] IVC       FINDINGS:  PSLA and PSSA views show appropriate squeeze and left ventricular wall thickening and concentric wall motion without visible abnormality    IVC plump on examination    INTERPRETATION:  Grossly normal cardiac function without overt wall motion abnormalities noted  IVC fullness indicates pt is likely fluid replete and in the setting of shock state warrants vasopressors      Images stored on [facility] : Sorin Castillo      INDICATION: Septic Shock, Recent cardioversion      PROCEDURE:    [x ] LIMITED ECHO    FINDINGS:  PSLA and PSSA views show appropriate squeeze and left ventricular wall thickening and concentric wall motion without visible abnormality    IVC plump on examination    INTERPRETATION:  Grossly normal cardiac function without overt wall motion abnormalities noted  IVC fullness indicates pt is likely fluid replete and in the setting of shock state warrants vasopressors      Images stored on [employer]      Attending Addendum:     I was present during the entire procedure and agree with the above findings and interpretation. TIme spent on the procedure was separate from the critical care time spent caring for the patient.     Mesfin Rowell MD

## 2021-07-31 NOTE — PROGRESS NOTE ADULT - SUBJECTIVE AND OBJECTIVE BOX
Post op Check    Pt seen and examined without complaints. Pain is controlled. Denies SOB/CP/N/V.     Vital Signs Last 24 Hrs  T(C): 36.5 (31 Jul 2021 09:21), Max: 39.6 (31 Jul 2021 05:15)  T(F): 97.7 (31 Jul 2021 09:21), Max: 103.2 (31 Jul 2021 05:15)  HR: 87 (31 Jul 2021 12:30) (71 - 101)  BP: 103/53 (31 Jul 2021 11:30) (82/46 - 142/77)  BP(mean): 76 (31 Jul 2021 11:30) (60 - 88)  RR: 16 (31 Jul 2021 12:30) (16 - 22)  SpO2: 95% (31 Jul 2021 12:30) (78% - 100%)    I&O's Summary    31 Jul 2021 07:01  -  31 Jul 2021 12:45  --------------------------------------------------------  IN: 0 mL / OUT: 550 mL / NET: -550 mL        Physical Exam  Gen: NAD, A&Ox3  Pulm: No respiratory distress, no subcostal retractions  CV: RRR, no JVD  Abd: Soft, NT, ND  : voiding clear urine                           15.3   8.72  )-----------( 214      ( 31 Jul 2021 03:25 )             46.4       07-31    140  |  104  |  39<H>  ----------------------------<  122<H>  4.3   |  25  |  1.78<H>    Ca    10.5      31 Jul 2021 03:25    TPro  7.0  /  Alb  4.0  /  TBili  0.7  /  DBili  x   /  AST  22  /  ALT  29  /  AlkPhos  79  07-31

## 2021-07-31 NOTE — CONSULT NOTE ADULT - SUBJECTIVE AND OBJECTIVE BOX
Patient is a 76y old  Male who presents with a chief complaint of SEPTIC URETERAL STONE (31 Jul 2021 07:54)    From HPI" HPI:  Seen in PACU    77 y/o M with PMH of afib on eliquis (had cardioversion yesterday), prostate ca s/p resection presenting with L flank pain that started in the evening. Patient states pain radiates to his groin. Has associated n/v. States pain is similar to stones in past. No fevers, chills, cp, sob, LE swelling s/p L ureteral stent. (31 Jul 2021 11:28)  "    Above verified-agree with above unless noted below.  pt seen bu urology  Had cysto with left stent insertion today am     ID consulted     PAST MEDICAL & SURGICAL HISTORY:  Paroxysmal atrial fibrillation    Prostate cancer    History of robot-assisted laparoscopic radical prostatectomy        Social history:   denies   Smoking,    ETOH,      IVDU       FAMILY HISTORY:  No pertinent family history in first degree relatives    - Family history of medical problems in all first degree relatives reviewed.None of these were found to be related to patients current illness.    REVIEW OF SYSTEMS  General:+malaise fatigue or chills. Fevers absent    Skin:No rash  	  Ophthalmologic:Denies any visual complaints,discharge redness or photophobia  	  ENMT:No nasal discharge,headache,sinus congestion or throat pain.No dental complaints    Respiratory and Thorax:No cough,sputum or chest pain.Denies shortness of breath  	  Cardiovascular:	No chest pain,palpitaions or dizziness    Gastrointestinal:	NO nausea,abdominal pain or diarrhea.    Genitourinary:	+dysuria,frequency.+ flank pain  Now resolved     Musculoskeletal:	No joint swelling or pain.No weakness    Neurological:No confusion,diziness.No extremity weakness.No bladder or bowel incontinence	                Allergic/Immunologic:	No hives or rash   Allergies    quinidine (Other)  sulfa drugs (Other)    Intolerances        Antimicrobials:    cefTRIAXone   IVPB 1000 milliGRAM(s) IV Intermittent every 24 hours      Prior Antimicrobials:  MEDICATIONS  (STANDING):    cefTRIAXone   IVPB   100 mL/Hr IV Intermittent (07-31-21 @ 05:21)      Other medications reviewed  MEDICATIONS  (STANDING):  cefTRIAXone   IVPB 1000 milliGRAM(s) IV Intermittent every 24 hours  diltiazem    milliGRAM(s) Oral daily  lactated ringers Bolus 1000 milliLiter(s) IV Bolus once  lactated ringers Bolus 1000 milliLiter(s) IV Bolus once  lactated ringers. 1000 milliLiter(s) (75 mL/Hr) IV Continuous <Continuous>  metoprolol succinate  milliGRAM(s) Oral daily  pantoprazole    Tablet 40 milliGRAM(s) Oral before breakfast  phenylephrine    Infusion 1 MICROgram(s)/kG/Min (30.6 mL/Hr) IV Continuous <Continuous>        Vital Signs Last 24 Hrs  T(C): 36.5 (31 Jul 2021 09:21), Max: 39.6 (31 Jul 2021 05:15)  T(F): 97.7 (31 Jul 2021 09:21), Max: 103.2 (31 Jul 2021 05:15)  HR: 85 (31 Jul 2021 13:30) (71 - 101)  BP: 91/51 (31 Jul 2021 13:30) (82/46 - 142/77)  BP(mean): 65 (31 Jul 2021 13:30) (60 - 88)  RR: 16 (31 Jul 2021 13:30) (16 - 22)  SpO2: 96% (31 Jul 2021 13:30) (78% - 100%)    PHYSICAL EXAM:Pleasant patient in no acute distress.      Constitutional:Comfortable.Awake and alert  No cachexia     Eyes:PERRL EOMI.NO discharge or conjunctival injection    ENMT:No sinus tenderness.No thrush.No pharyngeal exudate or erythema.Fair dental hygiene    Neck:Supple,No LN,no JVD      Respiratory:Good air entry bilaterally,CTA    Cardiovascular:S1 S2 wnl, No murmurs,rub or gallops    Gastrointestinal:Soft BS(+) no tenderness no masses ,No rebound or guarding    Genitourinary:No CVA tendereness         Extremities:No cyanosis,clubbing or edema.    Vascular:peripheral pulses felt    Neurological:AAO X 3,No grossly focal deficits          Musculoskeletal:No joint swelling or LOM              Labs:                            15.3   8.72  )-----------( 214      ( 31 Jul 2021 03:25 )             46.4     WBC Count: 8.72 (07-31-21 @ 03:25)  WBC Count: 7.65 (07-29-21 @ 11:41)      07-31    140  |  104  |  39<H>  ----------------------------<  122<H>  4.3   |  25  |  1.78<H>    Creatinine, Serum: 1.78 mg/dL (07-31-21 @ 03:25)  Creatinine, Serum: 1.15 mg/dL (07-29-21 @ 11:41)      Ca    10.5      31 Jul 2021 03:25    TPro  7.0  /  Alb  4.0  /  TBili  0.7  /  DBili  x   /  AST  22  /  ALT  29  /  AlkPhos  79  07-31        Urine Microscopic-Add On (NC) (07.31.21 @ 03:48)   Hyaline Casts: 4 /lpf   Bacteria: Many   Epithelial Cells: 0 /hpf   Red Blood Cell - Urine: 4 /hpf   White Blood Cell - Urine: 113 /HPF Urinalysis (07.31.21 @ 03:48)   pH Urine: 6.0   Glucose Qualitative, Urine: Negative   Blood, Urine: Moderate   Color: Yellow   Urine Appearance: Clear   Bilirubin: Negative   Ketone - Urine: Small   Specific Gravity: 1.019   Protein, Urine: 30 mg/dL   Urobilinogen: Negative   Nitrite: Positive   Leukocyte Esterase Concentration: Large     Culture - Urine (05.28.20 @ 15:18)   - Amikacin: S <=16   - Ampicillin: R >16 These ampicillin results predict results for amoxicillin   - Ampicillin/Sulbactam: I 16/8 Enterobacter, Citrobacter, and Serratia may develop resistance during prolonged therapy (3-4 days)   - Aztreonam: S <=4   - Cefazolin: S <=8 (MIC_CL_COM_ENTERIC_CEFAZU) For uncomplicated UTI with K. pneumoniae, E. coli, or P. mirablis: JOSE <=16 is sensitive and JOSE >=32 is resistant. This also predicts results for oral agents cefaclor, cefdinir, cefpodoxime, cefprozil, cefuroxime axetil, cephalexin and locarbef for uncomplicated UTI. Note that some isolates may be susceptible to these agents while testing resistant to cefazolin.   - Cefepime: S <=4   - Cefoxitin: S <=8   - Ceftriaxone: S <=1 Enterobacter, Citrobacter, and Serratia may develop resistance during prolonged therapy   - Ciprofloxacin: S <=1   - Gentamicin: S <=4   - Imipenem: S <=1   - Levofloxacin: S <=2   - Meropenem: S <=1   - Nitrofurantoin: S <=32 Should not be used to treat pyelonephritis   - Piperacillin/Tazobactam: S <=16   - Tigecycline: S <=2   - Tobramycin: S <=4   - Trimethoprim/Sulfamethoxazole: R >2/38   Specimen Source: .Urine Clean Catch (Midstream)   Culture Results:   >100,000 CFU/ml Escherichia coli   Organism Identification: Escherichia coli   Organism: Escherichia coli     < from: Xray Chest 1 View AP/PA (07.31.21 @ 06:37) >    IMPRESSION:  Clear lungs.      < end of copied text >  < from: CT Abdomen and Pelvis w/ IV Cont (07.31.21 @ 04:20) >    IMPRESSION:  Mild to moderate left hydroureter to the level of an obstructing 8 mm calculusin the proximal left ureter.          < end of copied text >    Imaging studies(films) were independently reviewed.Findings as detailed in report above

## 2021-07-31 NOTE — PROGRESS NOTE ADULT - SUBJECTIVE AND OBJECTIVE BOX
***************************************************************  Angelique Chan, PGY2  Internal Medicine   pager: NS: [phone] [facility]: [phone]  ***************************************************************    PROGRESS NOTE:     Patient is a 76y old  Male who presents with a chief complaint of SEPTIC URETERAL STONE (2021 07:54)      SUBJECTIVE / OVERNIGHT EVENTS:  HPI:  77 y/o M with PMH of afib on eliquis (dx in his 20s, gets frequent cardioversions, was at hospital for cardioversion the day before admission), prostate ca s/p resection presenting with L flank pain that started in the evening. Patient states that he initially had pain in his bladder area, which then migrated to his back. He also had associated n/v and emesis. States he had similar pain is similar last year on the R side, for which he had a nephrostomy tube placement for 1-2 days and then lithotripsy. In the hospital, a L ureteral internal stent was placed with plans for lithotripsy in 1-2 weeks. When seen today, patient denied fevers, chills, cp, sob, n/v, LE swelling s/p L ureteral stent. He is making urine without dysuria. When admitted, patient had 103F.     MICU was consulted for hypotension s/p L ureteral stent placement, requiring a dc ggt at 0.8 mcg/kg/min. He was given 2L IVF, with continued hypotension, and 1 dose of CTX. Patient will be admitted to the MICU for management of dc ggt and septic shock.    Social Hx: never smoker, former , lives with wife      MEDICATIONS  (STANDING):  cefTRIAXone   IVPB 1000 milliGRAM(s) IV Intermittent every 24 hours  diltiazem    milliGRAM(s) Oral daily  lactated ringers. 1000 milliLiter(s) (75 mL/Hr) IV Continuous <Continuous>  metoprolol succinate  milliGRAM(s) Oral daily  pantoprazole    Tablet 40 milliGRAM(s) Oral before breakfast  phenylephrine    Infusion 1 MICROgram(s)/kG/Min (30.6 mL/Hr) IV Continuous <Continuous>    MEDICATIONS  (PRN):  HYDROmorphone  Injectable 0.25 milliGRAM(s) IV Push every 10 minutes PRN Moderate Pain (4 - 6)  ondansetron Injectable 4 milliGRAM(s) IV Push every 6 hours PRN Nausea and/or Vomiting      CAPILLARY BLOOD GLUCOSE        I&O's Summary    2021 07:01  -  2021 16:29  --------------------------------------------------------  IN: 1399.2 mL / OUT: 1450 mL / NET: -50.8 mL        PHYSICAL EXAM:  Vital Signs Last 24 Hrs  T(C): 36.5 (2021 09:21), Max: 39.6 (2021 05:15)  T(F): 97.7 (2021 09:21), Max: 103.2 (2021 05:15)  HR: 80 (2021 16:15) (71 - 101)  BP: 104/53 (2021 16:00) (82/46 - 142/77)  BP(mean): 77 (2021 16:00) (60 - 88)  RR: 16 (2021 16:15) (16 - 22)  SpO2: 96% (2021 16:15) (78% - 100%)    CONSTITUTIONAL: NAD, well-developed  RESPIRATORY: Normal respiratory effort; lungs are clear to auscultation bilaterally  CARDIOVASCULAR: Regular rate and rhythm, normal S1 and S2, no murmur/rub/gallop; No lower extremity edema; Peripheral pulses are 2+ bilaterally  ABDOMEN: Nontender to palpation, normoactive bowel sounds, no rebound/guarding; No hepatosplenomegaly  MUSCULOSKELETAL: no clubbing or cyanosis of digits; no joint swelling or tenderness to palpation  NEURO: CN 2-12 grossly intact, moves all limbs spontaneously  PSYCH: A+O to person, place, and time; affect appropriate    LABS:                        15.3   8.72  )-----------( 214      ( 2021 03:25 )             46.4     -    140  |  104  |  39<H>  ----------------------------<  122<H>  4.3   |  25  |  1.78<H>    Ca    10.5      2021 03:25    TPro  7.0  /  Alb  4.0  /  TBili  0.7  /  DBili  x   /  AST  22  /  ALT  29  /  AlkPhos  79  07-    PT/INR - ( 2021 06:05 )   PT: 14.7 sec;   INR: 1.24 ratio         PTT - ( 2021 06:05 )  PTT:24.9 sec      Urinalysis Basic - ( 2021 03:48 )    Color: Yellow / Appearance: Clear / S.019 / pH: x  Gluc: x / Ketone: Small  / Bili: Negative / Urobili: Negative   Blood: x / Protein: 30 mg/dL / Nitrite: Positive   Leuk Esterase: Large / RBC: 4 /hpf /  /HPF   Sq Epi: x / Non Sq Epi: 0 /hpf / Bacteria: Many          RADIOLOGY & ADDITIONAL TESTS:  Results Reviewed:   Imaging Personally Reviewed:  Electrocardiogram Personally Reviewed:    COORDINATION OF CARE:  Care Discussed with Consultants/Other Providers [Y/N]:  Prior or Outpatient Records Reviewed [Y/N]:   ***************************************************************  Angelique Chan, PGY2  Internal Medicine   pager: NS: [phone] [facility]: [phone]  ***************************************************************    MICU Transfer/Accept Note    Patient is a 76y old  Male who presents with a chief complaint of SEPTIC URETERAL STONE (2021 07:54)      SUBJECTIVE / OVERNIGHT EVENTS:  HPI:  75 y/o M with PMH of afib on eliquis (dx in his 20s, gets frequent cardioversions, was at hospital for cardioversion the day before admission), prostate ca s/p resection presenting with L flank pain that started in the evening. Patient states that he initially had pain in his bladder area, which then migrated to his back. He also had associated n/v and emesis. States he had similar pain is similar last year on the R side, for which he had a nephrostomy tube placement for 1-2 days and then lithotripsy. In the hospital, a L ureteral internal stent was placed with plans for lithotripsy in 1-2 weeks. When seen today, patient denied fevers, chills, cp, sob, n/v, LE swelling s/p L ureteral stent. He is making urine without dysuria. When admitted, patient had 103F.     MICU was consulted for hypotension s/p L ureteral stent placement, requiring a dc ggt at 0.8 mcg/kg/min. He was given 2L IVF, with continued hypotension, and 1 dose of CTX. Patient will be admitted to the MICU for management of dc ggt and septic shock.    Social Hx: never smoker, former , lives with wife      MEDICATIONS  (STANDING):  cefTRIAXone   IVPB 1000 milliGRAM(s) IV Intermittent every 24 hours  diltiazem    milliGRAM(s) Oral daily  lactated ringers. 1000 milliLiter(s) (75 mL/Hr) IV Continuous <Continuous>  metoprolol succinate  milliGRAM(s) Oral daily  pantoprazole    Tablet 40 milliGRAM(s) Oral before breakfast  phenylephrine    Infusion 1 MICROgram(s)/kG/Min (30.6 mL/Hr) IV Continuous <Continuous>    MEDICATIONS  (PRN):  HYDROmorphone  Injectable 0.25 milliGRAM(s) IV Push every 10 minutes PRN Moderate Pain (4 - 6)  ondansetron Injectable 4 milliGRAM(s) IV Push every 6 hours PRN Nausea and/or Vomiting      CAPILLARY BLOOD GLUCOSE        I&O's Summary    2021 07:01  -  2021 16:29  --------------------------------------------------------  IN: 1399.2 mL / OUT: 1450 mL / NET: -50.8 mL        PHYSICAL EXAM:  Vital Signs Last 24 Hrs  T(C): 36.5 (2021 09:21), Max: 39.6 (2021 05:15)  T(F): 97.7 (2021 09:21), Max: 103.2 (2021 05:15)  HR: 80 (2021 16:15) (71 - 101)  BP: 104/53 (2021 16:00) (82/46 - 142/77)  BP(mean): 77 (2021 16:00) (60 - 88)  RR: 16 (2021 16:15) (16 - 22)  SpO2: 96% (2021 16:15) (78% - 100%)    CONSTITUTIONAL: NAD, well-developed  RESPIRATORY: Normal respiratory effort; lungs are clear to auscultation bilaterally  CARDIOVASCULAR: Regular rate and rhythm, normal S1 and S2, no murmur/rub/gallop; No lower extremity edema; Peripheral pulses are 2+ bilaterally  ABDOMEN: Nontender to palpation, normoactive bowel sounds, no rebound/guarding; No hepatosplenomegaly  MUSCULOSKELETAL: no clubbing or cyanosis of digits; no joint swelling or tenderness to palpation  NEURO: CN 2-12 grossly intact, moves all limbs spontaneously  PSYCH: A+O to person, place, and time; affect appropriate    LABS:                        15.3   8.72  )-----------( 214      ( 2021 03:25 )             46.4         140  |  104  |  39<H>  ----------------------------<  122<H>  4.3   |  25  |  1.78<H>    Ca    10.5      2021 03:25    TPro  7.0  /  Alb  4.0  /  TBili  0.7  /  DBili  x   /  AST  22  /  ALT  29  /  AlkPhos  79      PT/INR - ( 2021 06:05 )   PT: 14.7 sec;   INR: 1.24 ratio         PTT - ( 2021 06:05 )  PTT:24.9 sec      Urinalysis Basic - ( 2021 03:48 )    Color: Yellow / Appearance: Clear / S.019 / pH: x  Gluc: x / Ketone: Small  / Bili: Negative / Urobili: Negative   Blood: x / Protein: 30 mg/dL / Nitrite: Positive   Leuk Esterase: Large / RBC: 4 /hpf /  /HPF   Sq Epi: x / Non Sq Epi: 0 /hpf / Bacteria: Many          RADIOLOGY & ADDITIONAL TESTS:  Results Reviewed:   Imaging Personally Reviewed:  Electrocardiogram Personally Reviewed:    COORDINATION OF CARE:  Care Discussed with Consultants/Other Providers [Y/N]:  Prior or Outpatient Records Reviewed [Y/N]:

## 2021-07-31 NOTE — H&P ADULT - NSHPLABSRESULTS_GEN_ALL_CORE
15.3   8.72  )-----------( 214      ( 2021 03:25 )             46.4           140  |  104  |  39<H>  ----------------------------<  122<H>  4.3   |  25  |  1.78<H>    Ca    10.5      2021 03:25    TPro  7.0  /  Alb  4.0  /  TBili  0.7  /  DBili  x   /  AST  22  /  ALT  29  /  AlkPhos  79                Urinalysis Basic - ( 2021 03:48 )    Color: Yellow / Appearance: Clear / S.019 / pH: x  Gluc: x / Ketone: Small  / Bili: Negative / Urobili: Negative   Blood: x / Protein: 30 mg/dL / Nitrite: Positive   Leuk Esterase: Large / RBC: 4 /hpf /  /HPF   Sq Epi: x / Non Sq Epi: 0 /hpf / Bacteria: Many        PT/INR - ( 2021 06:05 )   PT: 14.7 sec;   INR: 1.24 ratio         PTT - ( 2021 06:05 )  PTT:24.9 sec  < from: Xray Chest 1 View AP/PA (21 @ 06:37) >      IMPRESSION:  Clear lungs.    < end of copied text >    < from: CT Abdomen and Pelvis w/ IV Cont (21 @ 04:20) >      IMPRESSION:  Mild to moderate left hydroureter to the level of an obstructing 8 mm calculusin the proximal left ureter.      < end of copied text >    Monitor SR

## 2021-07-31 NOTE — PROGRESS NOTE ADULT - ASSESSMENT
A/P: 76y Male admitted with left flank pain 2/2 left ureteral stone and fevers (Tm 103.2) s/p left ureteral stent, requiring dc post op for BP support, but no additional fevers, AMS or tachycardia   DVT prophylaxis/OOBr  Incentive spirometry  Strict I&O's  check PVR  Analgesia and antiemetics as needed  regular Diet  AM labs  f/u urine and blood cultures  ID consult pending  abx per ID   MICU consult pending  1L LR bolus ordered

## 2021-07-31 NOTE — ED PROVIDER NOTE - PHYSICAL EXAMINATION
gen: appears uncomfortable  Mentation: AAO x 3  psych: mood appropriate  ENT: airway patent  Eyes: conjunctivae clear bilaterally  Cardio: RRR, no m/r/g  Resp: normal BS b/l  GI: s/nt/nd  : no CVA tenderness  Neuro: sensation and motor function intact  Skin: No evidence of rash  MSK: normal movement of all extremities  Lymph/Vasc: no LE edema

## 2021-07-31 NOTE — CONSULT NOTE ADULT - ASSESSMENT
patient with septic obstructing stone for emergent cysto stent insertion  r/b/c/la of therapy discussed in detail and possible need for nt   He is also aware of need for staged therapy and risk of septicemia nd shock.  no evidence of cap recurrence but will repeat psa    He will be admitted for iv abx pending cultures and ? cardiology  fu re afib  will admit to med.

## 2021-07-31 NOTE — ED PROVIDER NOTE - CLINICAL SUMMARY MEDICAL DECISION MAKING FREE TEXT BOX
Neto,  PGY-3: 75 y/o M presenting with L flank pain, will check labs, CT, urine to check for obstructing/infected stone. DO Neto PGY-3: 75 y/o M presenting with L flank pain, will check labs, CT, urine to check for obstructing/infected stone.    Claire: 76 year old male with left flank pain. history of stone in past. radiates to groin. c/o n/v. will get labs, ct a/p, ivf, ua, uine culture, reassess

## 2021-07-31 NOTE — CONSULT NOTE ADULT - SUBJECTIVE AND OBJECTIVE BOX
HPI   Patient is well known to me long standing history of nephrolithiasis and uti with prostate cancer and urethral stricture now returns with left flank pain associated with fever chills, nausea.  He was noted to have 8 mm left renal stone now in left ureter with hydro .  He has refused rx of left sided stone in past. He was evaluated 1 week ago for evaluation of afib.     PAST MEDICAL & SURGICAL HISTORY:  Paroxysmal atrial fibrillation  Nephrolithiasis  Prostate cancer  History of robot-assisted laparoscopic radical prostatectomy    MEDICATIONS  (STANDING):   	Eliquis 5 mg oral tablet: 1 tab(s) orally 2 times a day  · 	dilTIAZem 180 mg/24 hours oral capsule, extended release: 1 cap(s) orally once a day  · 	Metamucil 3.4 g/3.7 g oral powder for reconstitution: 1 dose(s) orally once a day, · 	Colace 100 mg oral capsule: 1 cap(s) orally 2 times a day, As Needed  · 	omeprazole 20 mg oral delayed release tablet: 1 tab(s) orally once a day  · 	naproxen 500 mg oral tablet: 1 tab(s) orally 2 times a day, As Needed  · 	metoprolol succinate 100 mg oral tablet, extended release: 1 tab(s) orally once a day  · 	Multiple Vitamins oral tablet: 1 tab(s) orally once a day    MEDICATIONS  (PRN):      FAMILY HISTORY:  No pertinent family history in first degree relatives    Allergies    quinidine (Other)  sulfa drugs (Other)    SOCIAL HISTORY:  no tobacco etoh    REVIEW OF SYSTEMS:   Gen neg  heent neg  neck neg  cv per hpi  gi neg  gu per hpi  msk neg   neuro neg  skin neg  all neg  endo neg  psych neg    Physical Exam  Vital signs  T(C): 38.5 (21 @ 07:25), Max: 39.6 (21 @ 05:15)  HR: 96 (21 @ 07:25)  BP: 94/61 (21 @ 07:25)  SpO2: 95% (21 @ 06:30)    Gen:  WDWN male in nad  heent ncat neck symm supple  cor reg  chest clear  abd soft nd nt no cvat  gu absent prostate no mass  ext no c/c/e  neuro non focal  psych a and o x 3    LABS:       @ 03:25    WBC 8.72  / Hct 46.4  / SCr 1.78      @ 11:41    WBC 7.65  / Hct 48.9  / SCr 1.15         140  |  104  |  39<H>  ----------------------------<  122<H>  4.3   |  25  |  1.78<H>    Ca    10.5      2021 03:25    TPro  7.0  /  Alb  4.0  /  TBili  0.7  /  DBili  x   /  AST  22  /  ALT  29  /  AlkPhos  79      PT/INR - ( 2021 06:05 )   PT: 14.7 sec;   INR: 1.24 ratio         PTT - ( 2021 06:05 )  PTT:24.9 sec  Urinalysis Basic - ( 2021 03:48 )    Color: Yellow / Appearance: Clear / S.019 / pH: x  Gluc: x / Ketone: Small  / Bili: Negative / Urobili: Negative   Blood: x / Protein: 30 mg/dL / Nitrite: Positive   Leuk Esterase: Large / RBC: 4 /hpf /  /HPF   Sq Epi: x / Non Sq Epi: 0 /hpf / Bacteria: Many    RADIOLOGY: left 8mm ureteral calculus with hydro

## 2021-07-31 NOTE — ED PROVIDER NOTE - OBJECTIVE STATEMENT
75 y/o M with PMH of afib on eliquis (had cardioversion yesterday), prostate ca s/p resection presenting with L flank pain that started in the evening. Patient states pain radiates to his groin. Has associated n/v. States pain is similar to stones in past. No fevers, chills, cp, sob, LE swelling

## 2021-07-31 NOTE — CONSULT NOTE ADULT - ASSESSMENT
75 y/o M with PMH of afib on eliquis (had cardioversion yesterday), prostate ca s/p resection presenting with L flank pain that started in the evening. Patient states pain radiates to his groin. Has associated n/v. States pain is similar to stones in past. Denies fever, chills at home, in ED spiked to 103.2 and became agitated    CT: Mild to moderate left hydroureter to the level of an obstructing 8 mm calculus the proximal left ureter.  WBC 7.65  / Hct 48.9  / SCr 1.15   Nitrite: Positive    Discussed with Dr Goldberg:  Patient is booked for OR for emergent stent placement   please collect UA/urine culture and blood cultures x2  NPO  IVF Boluses

## 2021-07-31 NOTE — PROGRESS NOTE ADULT - ASSESSMENT
75 y/o M with PMH of afib on eliquis (dx in his 20s, gets frequent cardioversions, was at hospital for cardioversion the day before admission), prostate ca s/p resection presenting with L flank pain that started the evening before admission, s/p L ureteral stent placement, found to have fever 103F, and hypotension, with U/A concerning for URI, being transferred to the MICU for management of septic shock, now on dc ggt.    # Neurology  - No active issues, Ax3    # Cardiovascular    # Afib  - Patient has had intermittent atrial fibrillation since his 20s. He was cardioverted electrically the day before this admission, and is in normal sinus rhythm.  - No active issues at this time    # HTN  - Holding home anti-hypertensive medication at this time     # Hypotension 2/2 septic shock  - Continue dc ggt with MAP goal >65  - Start midodrine 20mg Q8h    # CAD s/p CABG  - Restart home meds asa 81 mg and atorvastatin 40 mg     # Respiratory  - No active issues at this time  - Saturating well on RA    # Gastrointestinal  - No active issues  - C/w DASH/TLC diet as tolerated    #   - Presented with L stone, now s/p L internal stent placement w/ plans for lithotripsy in 1-2 weeks  - U/A + for UTI  - f/u Urine Cx  - Abx per below  - Monitor urine output    # ID  - s/p CTX   - Started meropenem  - ID following: apprec recs  - f/u BCx and UCx  - Monitor fever curve    # Endocrine  - No active issues    # Hematology  - heparin SQ    # GoC  - Full code

## 2021-07-31 NOTE — ED ADULT NURSE NOTE - OBJECTIVE STATEMENT
75 y/o M AAOX3 PMH afib presents to ED c/o left flank pain. Pt states starting last night at 7pm, he began experiencing left sided flank pain radiating to his groin. Pt endorses dysuria. Upon assessment, pt breathing spontaneously, unlabored, SPO2 97% on RA. Pt abdomen soft, nontender. Endorses multiple episodes of n/v in waiting room. States pain was much worse around midnight, but has since decreased in intensity. Denies chest pain, palpitations, SOB, HA, dizziness, numbness, tingling, fever, chills. Patient placed in position of comfort, bed locked and in lowest position. Call bell within reach.

## 2021-07-31 NOTE — ED ADULT NURSE REASSESSMENT NOTE - NS ED NURSE REASSESS COMMENT FT1
0710 Pt in ER rm 21 purple. Wife at [facility]. Fall risk  precautions maintained. Scheduled  to go to OR. NPO. IVL intact without sx of infilt. Skin W&D. Lips and nailbeds pink. No c/o pain at present. 0718Report given to OR RN by night nurse JAMAR Casarez 0710 Pt [facility] ER rm 21 purple. [facility] risk  precautions maintained. Scheduled  to go to OR. NPO. IVL intact without sx of infilt. O2 NC 3lpm intactSkin W&D. Lips and nailbeds pink. No c/o pain at present. 0739Report given to OR RN by night nurse Ayaka Casarez RN

## 2021-07-31 NOTE — CONSULT NOTE ADULT - ASSESSMENT
77 y/o M with PMH of afib on eliquis (had cardioversion yesterday), prostate ca s/p resection presenting with L flank pain that started in the evening. Patient states pain radiates to his groin. Has associated n/v. States pain is similar to stones in past. No fevers, chills, cp, sob, LE swelling   CT with obstructing 8 mm L stone  s/p  L ureteral stent.  clinically stable  no s/s dissemination  Cultures pending  Rec:  A) UTI   Pyelo   Nephrolithiasis s/p stent  No recent abx  prior E coli in urine  75 y/o M with PMH of afib on eliquis (had cardioversion yesterday), prostate ca s/p resection presenting with L flank pain that started in the evening. Patient states pain radiates to his groin. Has associated n/v. States pain is similar to stones in past. No fevers, chills, cp, sob, LE swelling   CT with obstructing 8 mm L stone  s/p  L ureteral stent.  clinically stable  no s/s dissemination  Cultures pending  Rec:  A) UTI   Pyelo   Nephrolithiasis s/p stent  No recent abx  prior E coli in urine pan sensitive  Rec  Follwo Cx  follow clinically  continue ceftriaxone  Tailor per results      B) Nephrolithiasis  s/p stent  further plan per urology    Will tailor plan for ID issues  per course,results.Will defer to primary team on management of other issues.  Assessment, plan and recommendations as detailed above were discussed with the urology  team.  Will Follow.  Beeper [phone] [facility] [id number].   Wknd/afterhours/No response-[phone] or Fellow on call

## 2021-07-31 NOTE — H&P ADULT - ASSESSMENT
76 m with    Ureteral stone and UTI  - postop care  - antibiotic  - Urology follow  - ID evaluation  - pain control    A Fib  - s/p cardioversion  - continue Rx  - cardiology evaluation called    Prostate Ca  - stable    DVT prophylaxis  - Eliquis    Further action as per clinical course     Dylan Billingsley MD pager [phone]

## 2021-07-31 NOTE — ED PROVIDER NOTE - PROGRESS NOTE DETAILS
DO Neto PGY-3: pt found to have 8 mm obstructing stone, UA + for UTI, uro consulted for infected and obstructing stone, will come see patient

## 2021-07-31 NOTE — H&P ADULT - NSHPPHYSICALEXAM_GEN_ALL_CORE
PHYSICAL EXAMINATION:  Vital Signs Last 24 Hrs  T(C): 36.5 (31 Jul 2021 09:21), Max: 39.6 (31 Jul 2021 05:15)  T(F): 97.7 (31 Jul 2021 09:21), Max: 103.2 (31 Jul 2021 05:15)  HR: 87 (31 Jul 2021 10:45) (71 - 101)  BP: 109/59 (31 Jul 2021 10:45) (82/46 - 142/77)  BP(mean): 80 (31 Jul 2021 10:45) (60 - 80)  RR: 16 (31 Jul 2021 10:45) (16 - 22)  SpO2: 99% (31 Jul 2021 10:45) (88% - 100%)  CAPILLARY BLOOD GLUCOSE          GENERAL: NAD, well-groomed, well-developed  HEAD:  atraumatic, normocephalic  EYES: sclera anicteric  ENMT: mucous membranes moist  NECK: supple, No JVD  CHEST/LUNG: clear to auscultation bilaterally; no rales, rhonchi, or wheezing b/l  HEART: normal S1, S2  ABDOMEN: BS+, soft, ND, NT   EXTREMITIES:  pulses palpable; no clubbing, cyanosis, or edema b/l LEs  NEURO: awake, alert, interactive; moves all extremities  SKIN: no rashes or lesions

## 2021-07-31 NOTE — H&P ADULT - HISTORY OF PRESENT ILLNESS
Seen in PACU    77 y/o M with PMH of afib on eliquis (had cardioversion yesterday), prostate ca s/p resection presenting with L flank pain that started in the evening. Patient states pain radiates to his groin. Has associated n/v. States pain is similar to stones in past. No fevers, chills, cp, sob, LE swelling s/p L ureteral stent.

## 2021-08-01 LAB
ALBUMIN SERPL ELPH-MCNC: 3.3 G/DL — SIGNIFICANT CHANGE UP (ref 3.3–5)
ALP SERPL-CCNC: 58 U/L — SIGNIFICANT CHANGE UP (ref 40–120)
ALT FLD-CCNC: 22 U/L — SIGNIFICANT CHANGE UP (ref 10–45)
ANION GAP SERPL CALC-SCNC: 11 MMOL/L — SIGNIFICANT CHANGE UP (ref 5–17)
AST SERPL-CCNC: 29 U/L — SIGNIFICANT CHANGE UP (ref 10–40)
BASE EXCESS BLDV CALC-SCNC: -0.6 MMOL/L — SIGNIFICANT CHANGE UP (ref -2–2)
BILIRUB SERPL-MCNC: 0.4 MG/DL — SIGNIFICANT CHANGE UP (ref 0.2–1.2)
BUN SERPL-MCNC: 32 MG/DL — HIGH (ref 7–23)
CA-I SERPL-SCNC: 1.25 MMOL/L — SIGNIFICANT CHANGE UP (ref 1.12–1.3)
CALCIUM SERPL-MCNC: 9.4 MG/DL — SIGNIFICANT CHANGE UP (ref 8.4–10.5)
CHLORIDE BLDV-SCNC: 112 MMOL/L — HIGH (ref 96–108)
CHLORIDE SERPL-SCNC: 109 MMOL/L — HIGH (ref 96–108)
CO2 BLDV-SCNC: 24 MMOL/L — SIGNIFICANT CHANGE UP (ref 22–30)
CO2 SERPL-SCNC: 20 MMOL/L — LOW (ref 22–31)
CREAT SERPL-MCNC: 1.4 MG/DL — HIGH (ref 0.5–1.3)
GAS PNL BLDV: 139 MMOL/L — SIGNIFICANT CHANGE UP (ref 135–145)
GAS PNL BLDV: SIGNIFICANT CHANGE UP
GAS PNL BLDV: SIGNIFICANT CHANGE UP
GLUCOSE BLDV-MCNC: 138 MG/DL — HIGH (ref 70–99)
GLUCOSE SERPL-MCNC: 137 MG/DL — HIGH (ref 70–99)
HCO3 BLDV-SCNC: 23 MMOL/L — SIGNIFICANT CHANGE UP (ref 21–29)
HCT VFR BLD CALC: 40.4 % — SIGNIFICANT CHANGE UP (ref 39–50)
HCT VFR BLDA CALC: 43 % — SIGNIFICANT CHANGE UP (ref 39–50)
HGB BLD CALC-MCNC: 14 G/DL — SIGNIFICANT CHANGE UP (ref 13–17)
HGB BLD-MCNC: 13.7 G/DL — SIGNIFICANT CHANGE UP (ref 13–17)
LACTATE BLDV-MCNC: 2.2 MMOL/L — HIGH (ref 0.7–2)
MAGNESIUM SERPL-MCNC: 2 MG/DL — SIGNIFICANT CHANGE UP (ref 1.6–2.6)
MCHC RBC-ENTMCNC: 32 PG — SIGNIFICANT CHANGE UP (ref 27–34)
MCHC RBC-ENTMCNC: 33.9 GM/DL — SIGNIFICANT CHANGE UP (ref 32–36)
MCV RBC AUTO: 94.4 FL — SIGNIFICANT CHANGE UP (ref 80–100)
NRBC # BLD: 0 /100 WBCS — SIGNIFICANT CHANGE UP (ref 0–0)
OTHER CELLS CSF MANUAL: 18 ML/DL — SIGNIFICANT CHANGE UP (ref 18–22)
PCO2 BLDV: 34 MMHG — LOW (ref 35–50)
PH BLDV: 7.43 — SIGNIFICANT CHANGE UP (ref 7.35–7.45)
PHOSPHATE SERPL-MCNC: 2.2 MG/DL — LOW (ref 2.5–4.5)
PLATELET # BLD AUTO: 183 K/UL — SIGNIFICANT CHANGE UP (ref 150–400)
PO2 BLDV: 72 MMHG — HIGH (ref 25–45)
POTASSIUM BLDV-SCNC: 3.7 MMOL/L — SIGNIFICANT CHANGE UP (ref 3.5–5.3)
POTASSIUM SERPL-MCNC: 3.9 MMOL/L — SIGNIFICANT CHANGE UP (ref 3.5–5.3)
POTASSIUM SERPL-SCNC: 3.9 MMOL/L — SIGNIFICANT CHANGE UP (ref 3.5–5.3)
PROT SERPL-MCNC: 6.2 G/DL — SIGNIFICANT CHANGE UP (ref 6–8.3)
RBC # BLD: 4.28 M/UL — SIGNIFICANT CHANGE UP (ref 4.2–5.8)
RBC # FLD: 14.5 % — SIGNIFICANT CHANGE UP (ref 10.3–14.5)
SAO2 % BLDV: 95 % — HIGH (ref 67–88)
SODIUM SERPL-SCNC: 140 MMOL/L — SIGNIFICANT CHANGE UP (ref 135–145)
WBC # BLD: 26.44 K/UL — HIGH (ref 3.8–10.5)
WBC # FLD AUTO: 26.44 K/UL — HIGH (ref 3.8–10.5)

## 2021-08-01 PROCEDURE — 99291 CRITICAL CARE FIRST HOUR: CPT

## 2021-08-01 PROCEDURE — 99233 SBSQ HOSP IP/OBS HIGH 50: CPT

## 2021-08-01 RX ORDER — POTASSIUM PHOSPHATE, MONOBASIC POTASSIUM PHOSPHATE, DIBASIC 236; 224 MG/ML; MG/ML
15 INJECTION, SOLUTION INTRAVENOUS ONCE
Refills: 0 | Status: COMPLETED | OUTPATIENT
Start: 2021-08-01 | End: 2021-08-01

## 2021-08-01 RX ADMIN — MIDODRINE HYDROCHLORIDE 10 MILLIGRAM(S): 2.5 TABLET ORAL at 08:28

## 2021-08-01 RX ADMIN — MIDODRINE HYDROCHLORIDE 10 MILLIGRAM(S): 2.5 TABLET ORAL at 17:02

## 2021-08-01 RX ADMIN — APIXABAN 5 MILLIGRAM(S): 2.5 TABLET, FILM COATED ORAL at 17:02

## 2021-08-01 RX ADMIN — PIPERACILLIN AND TAZOBACTAM 25 GRAM(S): 4; .5 INJECTION, POWDER, LYOPHILIZED, FOR SOLUTION INTRAVENOUS at 00:23

## 2021-08-01 RX ADMIN — PIPERACILLIN AND TAZOBACTAM 25 GRAM(S): 4; .5 INJECTION, POWDER, LYOPHILIZED, FOR SOLUTION INTRAVENOUS at 15:07

## 2021-08-01 RX ADMIN — APIXABAN 5 MILLIGRAM(S): 2.5 TABLET, FILM COATED ORAL at 07:17

## 2021-08-01 RX ADMIN — PIPERACILLIN AND TAZOBACTAM 25 GRAM(S): 4; .5 INJECTION, POWDER, LYOPHILIZED, FOR SOLUTION INTRAVENOUS at 08:28

## 2021-08-01 RX ADMIN — MIDODRINE HYDROCHLORIDE 10 MILLIGRAM(S): 2.5 TABLET ORAL at 01:15

## 2021-08-01 RX ADMIN — CHLORHEXIDINE GLUCONATE 1 APPLICATION(S): 213 SOLUTION TOPICAL at 06:49

## 2021-08-01 RX ADMIN — POTASSIUM PHOSPHATE, MONOBASIC POTASSIUM PHOSPHATE, DIBASIC 62.5 MILLIMOLE(S): 236; 224 INJECTION, SOLUTION INTRAVENOUS at 04:06

## 2021-08-01 RX ADMIN — PANTOPRAZOLE SODIUM 40 MILLIGRAM(S): 20 TABLET, DELAYED RELEASE ORAL at 06:50
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
## 2021-08-01 NOTE — PROGRESS NOTE ADULT - ASSESSMENT
patient improving sp stent for urosepsis related to obstructing stone  continue monitor bp and taper meds  await urine culture  broad spectrum abx until culture and sensitivity are available and rx pre id    staged rx for renal calculi and stent removal

## 2021-08-01 NOTE — CHART NOTE - NSCHARTNOTEFT_GEN_A_CORE
MICU Transfer Note    Transfer from: MICU    Transfer to: (X ) Medicine    (  ) Telemetry     (   ) RCU        (    ) Palliative         (   ) Stroke Unit          (   ) __________________    Accepting Physician: Jose Cruz  Signout given to:     MICU COURSE:  HPI:  77 y/o M with PMH of afib on eliquis (had cardioversion yesterday), prostate ca s/p resection presenting with L flank pain that started in the evening. Patient states pain radiates to his groin. Has associated n/v. States pain is similar to stones in past. No fevers, chills, cp, sob, LE swelling s/p L ureteral stent. (31 Jul 2021 11:28)    Pt went into septic shock, required IV pressors, now titrated off. Started on midodrine 10 mg tid. Pt ready for floors.       ASSESSMENT & PLAN:   77 y/o M with PMH of afib on eliquis (dx in his 20s, gets frequent cardioversions, was at hospital for cardioversion the day before admission), prostate ca s/p resection presenting with L flank pain that started the evening before admission, s/p L ureteral stent placement, found to have fever 103F, and hypotension, with U/A concerning for URI, being transferred to the MICU for management of septic shock, now on dc ggt.    # Neurology  - No active issues, Ax3    # Cardiovascular  Hypotension 2/2 septic shock  - now off phenylephrine  - Started midodrine 10mg Q8h; titrate off as tolerated    Afib  - Patient has had intermittent atrial fibrillation since his 20s. He was cardioverted electrically the day before this admission, and is in normal sinus rhythm.  - No active issues at this time    HTN  - Holding home anti-hypertensive medication at this time     CAD s/p CABG  - Restart home meds asa 81 mg and atorvastatin 40 mg     # Respiratory  - No active issues at this time  - Saturating well on RA    # Gastrointestinal  - No active issues  - C/w DASH/TLC diet as tolerated    #   - Presented with L stone, now s/p L internal stent placement w/ plans for lithotripsy in 1-2 weeks  - U/A + for UTI  - f/u Urine Cx  - Abx per below  - Monitor urine output    # ID  - s/p CTX   - Started zosyn 7/31  - ID following: apprec recs  - f/u BCx and UCx  - Monitor fever curve    # Endocrine  - No active issues    # Hematology  - heparin SQ    # GoC  - Full code      FOR FOLLOW UP:  [ ] urology f/u outpatient for lithotripsy  [ ] resume home BP meds when off midodrine  [ ] titrate off midodrine as tolerated

## 2021-08-01 NOTE — PROGRESS NOTE ADULT - SUBJECTIVE AND OBJECTIVE BOX
Patient is a 76y old  Male who presents with a chief complaint of renal colic (01 Aug 2021 10:30)      SUBJECTIVE / OVERNIGHT EVENTS: Comfortable without new complaints.   Review of Systems  chest pain no  palpitations no  sob no  nausea no  headache no    MEDICATIONS  (STANDING):  apixaban 5 milliGRAM(s) Oral every 12 hours  chlorhexidine 4% Liquid 1 Application(s) Topical <User Schedule>  midodrine 10 milliGRAM(s) Oral every 8 hours  pantoprazole    Tablet 40 milliGRAM(s) Oral before breakfast  phenylephrine    Infusion 1 MICROgram(s)/kG/Min (30.6 mL/Hr) IV Continuous <Continuous>  piperacillin/tazobactam IVPB.. 3.375 Gram(s) IV Intermittent every 8 hours    MEDICATIONS  (PRN):  HYDROmorphone  Injectable 0.25 milliGRAM(s) IV Push every 10 minutes PRN Moderate Pain (4 - 6)      Vital Signs Last 24 Hrs  T(C): 36.9 (01 Aug 2021 15:00), Max: 37.3 (01 Aug 2021 00:00)  T(F): 98.5 (01 Aug 2021 15:00), Max: 99.1 (01 Aug 2021 00:00)  HR: 73 (01 Aug 2021 15:00) (61 - 83)  BP: 94/54 (01 Aug 2021 15:00) (75/53 - 131/60)  BP(mean): 71 (01 Aug 2021 15:00) (60 - 90)  RR: 19 (01 Aug 2021 15:00) (14 - 35)  SpO2: 94% (01 Aug 2021 15:00) (92% - 98%)    PHYSICAL EXAM:  GENERAL: NAD, well-developed  HEAD:  Atraumatic, Normocephalic  EYES: EOMI, PERRLA, conjunctiva and sclera clear  NECK: Supple, No JVD  CHEST/LUNG: Clear to auscultation bilaterally; No wheeze  HEART: Regular rate and rhythm; No murmurs, rubs, or gallops  ABDOMEN: Soft, Nontender, Nondistended; Bowel sounds present  EXTREMITIES:  2+ Peripheral Pulses, No clubbing, cyanosis, or edema  PSYCH: AAOx3  NEUROLOGY: non-focal  SKIN: No rashes or lesions    LABS:                        13.7   26.44 )-----------( 183      ( 01 Aug 2021 00:39 )             40.4     08-    140  |  109<H>  |  32<H>  ----------------------------<  137<H>  3.9   |  20<L>  |  1.40<H>    Ca    9.4      01 Aug 2021 00:43  Phos  2.2       Mg     2.0         TPro  6.2  /  Alb  3.3  /  TBili  0.4  /  DBili  x   /  AST  29  /  ALT  22  /  AlkPhos  58      PT/INR - ( 2021 06:05 )   PT: 14.7 sec;   INR: 1.24 ratio         PTT - ( 2021 06:05 )  PTT:24.9 sec      Urinalysis Basic - ( 2021 03:48 )    Color: Yellow / Appearance: Clear / S.019 / pH: x  Gluc: x / Ketone: Small  / Bili: Negative / Urobili: Negative   Blood: x / Protein: 30 mg/dL / Nitrite: Positive   Leuk Esterase: Large / RBC: 4 /hpf /  /HPF   Sq Epi: x / Non Sq Epi: 0 /hpf / Bacteria: Many        Culture - Blood (collected 2021 10:16)  Source: .Blood Blood  Preliminary Report (01 Aug 2021 11:01):    No growth to date.    Culture - Blood (collected 2021 10:15)  Source: .Blood Blood  Preliminary Report (01 Aug 2021 11:01):    No growth to date.        RADIOLOGY & ADDITIONAL TESTS:    Imaging Personally Reviewed:    Consultant(s) Notes Reviewed:      Care Discussed with Consultants/Other Providers:

## 2021-08-01 NOTE — PROGRESS NOTE ADULT - ASSESSMENT
77 y/o M with PMH of afib on eliquis (dx in his 20s, gets frequent cardioversions, was at hospital for cardioversion the day before admission), prostate ca s/p resection presenting with L flank pain that started the evening before admission, s/p L ureteral stent placement, found to have fever 103F, and hypotension, with U/A concerning for URI, being transferred to the MICU for management of septic shock, now on dc ggt.    # Neurology  - No active issues, Ax3    # Cardiovascular    # Afib  - Patient has had intermittent atrial fibrillation since his 20s. He was cardioverted electrically the day before this admission, and is in normal sinus rhythm.  - No active issues at this time    # HTN  - Holding home anti-hypertensive medication at this time     # Hypotension 2/2 septic shock  - Continue dc ggt with MAP goal >65  - Start midodrine 20mg Q8h    # CAD s/p CABG  - Restart home meds asa 81 mg and atorvastatin 40 mg     # Respiratory  - No active issues at this time  - Saturating well on RA    # Gastrointestinal  - No active issues  - C/w DASH/TLC diet as tolerated    #   - Presented with L stone, now s/p L internal stent placement w/ plans for lithotripsy in 1-2 weeks  - U/A + for UTI  - f/u Urine Cx  - Abx per below  - Monitor urine output    # ID  - s/p CTX   - Started meropenem  - ID following: apprec recs  - f/u BCx and UCx  - Monitor fever curve    # Endocrine  - No active issues    # Hematology  - heparin SQ    # GoC  - Full code       77 y/o M with PMH of afib on eliquis (dx in his 20s, gets frequent cardioversions, was at hospital for cardioversion the day before admission), prostate ca s/p resection presenting with L flank pain that started the evening before admission, s/p L ureteral stent placement, found to have fever 103F, and hypotension, with U/A concerning for URI, being transferred to the MICU for management of septic shock, now on dc ggt.    # Neurology  - No active issues, Ax3    # Cardiovascular  Hypotension 2/2 septic shock  - titrating off phenylephrine; MAP goal >65  - Started midodrine 10mg Q8h; titrate off as tolerated    Afib  - Patient has had intermittent atrial fibrillation since his 20s. He was cardioverted electrically the day before this admission, and is in normal sinus rhythm.  - No active issues at this time    HTN  - Holding home anti-hypertensive medication at this time     CAD s/p CABG  - Restart home meds asa 81 mg and atorvastatin 40 mg     # Respiratory  - No active issues at this time  - Saturating well on RA    # Gastrointestinal  - No active issues  - C/w DASH/TLC diet as tolerated    #   - Presented with L stone, now s/p L internal stent placement w/ plans for lithotripsy in 1-2 weeks  - U/A + for UTI  - f/u Urine Cx  - Abx per below  - Monitor urine output    # ID  - s/p CTX   - Started zosyn 7/31  - ID following: apprec recs  - f/u BCx and UCx  - Monitor fever curve    # Endocrine  - No active issues    # Hematology  - heparin SQ    # GoC  - Full code

## 2021-08-01 NOTE — PROGRESS NOTE ADULT - SUBJECTIVE AND OBJECTIVE BOX
Ortega Marcelino MD, PGY-3  Available on [employer] Teams | Pager: [phone] | [facility]: [phone]  ---------------------------------------------------------------------------------------------  Patient is a 76y old  Male who presents with a chief complaint of Septic shock (2021 16:28)      HPI:  Seen in PACU    77 y/o M with PMH of afib on eliquis (had cardioversion yesterday), prostate ca s/p resection presenting with L flank pain that started in the evening. Patient states pain radiates to his groin. Has associated n/v. States pain is similar to stones in past. No fevers, chills, cp, sob, LE swelling s/p L ureteral stent. (2021 11:28)      SUBJECTIVE / OVERNIGHT EVENTS:  ADDITIONAL REVIEW OF SYSTEMS:    MEDICATIONS  (STANDING):  apixaban 5 milliGRAM(s) Oral every 12 hours  chlorhexidine 4% Liquid 1 Application(s) Topical <User Schedule>  midodrine 10 milliGRAM(s) Oral every 8 hours  pantoprazole    Tablet 40 milliGRAM(s) Oral before breakfast  phenylephrine    Infusion 1 MICROgram(s)/kG/Min (30.6 mL/Hr) IV Continuous <Continuous>  piperacillin/tazobactam IVPB.. 3.375 Gram(s) IV Intermittent every 8 hours    MEDICATIONS  (PRN):  HYDROmorphone  Injectable 0.25 milliGRAM(s) IV Push every 10 minutes PRN Moderate Pain (4 - 6)    Allergies    quinidine (Other)  sulfa drugs (Other)    Intolerances        ICU Vital Signs Last 24 Hrs  T(F): 98.4 (01 Aug 2021 04:00), Max: 99.1 (01 Aug 2021 00:00)  HR: 71 (01 Aug 2021 06:30) (61 - 96)  BP: 118/58 (01 Aug 2021 06:30) (82/46 - 131/60)  BP(mean): 81 (01 Aug 2021 06:30) (60 - 90)  ABP: 143/106 (01 Aug 2021 05:30) (38/22 - 143/106)  ABP(mean): 123 (01 Aug 2021 05:30) (28 - 123)  RR: 17 (01 Aug 2021 06:30) (14 - 31)  SpO2: 94% (01 Aug 2021 06:30) (78% - 100%)      Physical Exam:     I&O's Summary    2021 07:01  -  01 Aug 2021 07:00  --------------------------------------------------------  IN: 2391.3 mL / OUT: 2900 mL / NET: -508.7 mL        LABS:                        13.7   26.44 )-----------( 183      ( 01 Aug 2021 00:39 )             40.4         140  |  109<H>  |  32<H>  ----------------------------<  137<H>  3.9   |  20<L>  |  1.40<H>    Ca    9.4      01 Aug 2021 00:43  Phos  2.2       Mg     2.0         TPro  6.2  /  Alb  3.3  /  TBili  0.4  /  DBili  x   /  AST  29  /  ALT  22  /  AlkPhos  58      PT/INR - ( 2021 06:05 )   PT: 14.7 sec;   INR: 1.24 ratio         PTT - ( 2021 06:05 )  PTT:24.9 sec  ABG - ( 2021 17:23 )  pH, Arterial: 7.45  pH, Blood: x     /  pCO2: 34    /  pO2: 78    / HCO3: 23    / Base Excess: .2    /  SaO2: 96                pH, Venous: 7.43 (21 @ 00:36)  pCO2, Venous: 34 mmHg (21 @ 00:36)  pO2, Venous: 72 mmHg (21 @ 00:36)  HCO3, Venous: 23 mmol/L (21 @ 00:36)    Blood Gas Venous - Lactate: 2.2 mmoL/L (21 @ 00:36)    Urinalysis Basic - ( 2021 03:48 )    Color: Yellow / Appearance: Clear / S.019 / pH: x  Gluc: x / Ketone: Small  / Bili: Negative / Urobili: Negative   Blood: x / Protein: 30 mg/dL / Nitrite: Positive   Leuk Esterase: Large / RBC: 4 /hpf /  /HPF   Sq Epi: x / Non Sq Epi: 0 /hpf / Bacteria: Many          CAPILLARY BLOOD GLUCOSE          RADIOLOGY & ADDITIONAL TESTS:  Results Reviewed:   Imaging Personally Reviewed:  Electrocardiogram Personally Reviewed: Ortega Marcelino MD, PGY-3  Available on [employer] Teams | Pager: [phone] | [facility]: [phone]  ---------------------------------------------------------------------------------------------  Patient is a 76y old  Male who presents with a chief complaint of Septic shock (2021 16:28)      HPI:  77 y/o M with PMH of afib on eliquis (had cardioversion yesterday), prostate ca s/p resection presenting with L flank pain that started in the evening. Patient states pain radiates to his groin. Has associated n/v. States pain is similar to stones in past. No fevers, chills, cp, sob, LE swelling s/p L ureteral stent. (2021 11:28)    SUBJECTIVE / OVERNIGHT EVENTS: No acute events overnight. Pt seen and examined at bedside. Downtitrating pressor requirement. No acute complaints; wants to go home.      MEDICATIONS  (STANDING):  apixaban 5 milliGRAM(s) Oral every 12 hours  chlorhexidine 4% Liquid 1 Application(s) Topical <User Schedule>  midodrine 10 milliGRAM(s) Oral every 8 hours  pantoprazole    Tablet 40 milliGRAM(s) Oral before breakfast  phenylephrine    Infusion 1 MICROgram(s)/kG/Min (30.6 mL/Hr) IV Continuous <Continuous>  piperacillin/tazobactam IVPB.. 3.375 Gram(s) IV Intermittent every 8 hours    MEDICATIONS  (PRN):  HYDROmorphone  Injectable 0.25 milliGRAM(s) IV Push every 10 minutes PRN Moderate Pain (4 - 6)    Allergies    quinidine (Other)  sulfa drugs (Other)    Intolerances        ICU Vital Signs Last 24 Hrs  T(F): 98.4 (01 Aug 2021 04:00), Max: 99.1 (01 Aug 2021 00:00)  HR: 71 (01 Aug 2021 06:30) (61 - 96)  BP: 118/58 (01 Aug 2021 06:30) (82/46 - 131/60)  BP(mean): 81 (01 Aug 2021 06:30) (60 - 90)  ABP: 143/106 (01 Aug 2021 05:30) (38/22 - 143/106)  ABP(mean): 123 (01 Aug 2021 05:30) (28 - 123)  RR: 17 (01 Aug 2021 06:30) (14 - 31)  SpO2: 94% (01 Aug 2021 06:30) (78% - 100%)      Physical Exam:   GENERAL: NAD, well appearing  HEAD:  Atraumatic, Normocephalic  EYES: EOMI, PERRLA, conjunctiva and sclera clear  NECK: Supple, No JVD  CHEST/LUNG: Clear to auscultation bilaterally; No wheeze  HEART: Regular rate and rhythm; No murmurs, rubs, or gallops  ABDOMEN: Soft, Nontender, Nondistended; Bowel sounds present  EXTREMITIES:  2+ Peripheral Pulses, No clubbing, cyanosis, or edema  PSYCH: AAOx3  NEUROLOGY: non-focal  SKIN: No rashes or lesions    I&O's Summary    2021 07:01  -  01 Aug 2021 07:00  --------------------------------------------------------  IN: 2391.3 mL / OUT: 2900 mL / NET: -508.7 mL        LABS:                        13.7   26.44 )-----------( 183      ( 01 Aug 2021 00:39 )             40.4     08-    140  |  109<H>  |  32<H>  ----------------------------<  137<H>  3.9   |  20<L>  |  1.40<H>    Ca    9.4      01 Aug 2021 00:43  Phos  2.2     08-  Mg     2.0     08-    TPro  6.2  /  Alb  3.3  /  TBili  0.4  /  DBili  x   /  AST  29  /  ALT  22  /  AlkPhos  58  08    PT/INR - ( 2021 06:05 )   PT: 14.7 sec;   INR: 1.24 ratio         PTT - ( 2021 06:05 )  PTT:24.9 sec  ABG - ( 2021 17:23 )  pH, Arterial: 7.45  pH, Blood: x     /  pCO2: 34    /  pO2: 78    / HCO3: 23    / Base Excess: .2    /  SaO2: 96                pH, Venous: 7.43 (21 @ 00:36)  pCO2, Venous: 34 mmHg (21 @ 00:36)  pO2, Venous: 72 mmHg (21 @ 00:36)  HCO3, Venous: 23 mmol/L (21 @ 00:36)    Blood Gas Venous - Lactate: 2.2 mmoL/L (21 @ 00:36)    Urinalysis Basic - ( 2021 03:48 )    Color: Yellow / Appearance: Clear / S.019 / pH: x  Gluc: x / Ketone: Small  / Bili: Negative / Urobili: Negative   Blood: x / Protein: 30 mg/dL / Nitrite: Positive   Leuk Esterase: Large / RBC: 4 /hpf /  /HPF   Sq Epi: x / Non Sq Epi: 0 /hpf / Bacteria: Many          CAPILLARY BLOOD GLUCOSE          RADIOLOGY & ADDITIONAL TESTS:  Results Reviewed:   Imaging Personally Reviewed:  Electrocardiogram Personally Reviewed:

## 2021-08-01 NOTE — PROGRESS NOTE ADULT - ASSESSMENT
76 m with    Hypotension 2/2 septic shock  - titrating off phenylephrine  - midodrine 10mg Q8h  - ICU care    Ureteral stone and UTI  - postop care  - antibiotic  - Urology follow  - ID follow  - pain control    A Fib  - s/p cardioversion  - continue Rx  - cardiology evaluation     Prostate Ca  - stable    DVT prophylaxis  - Chance Billingsley MD pager [phone]

## 2021-08-01 NOTE — PROGRESS NOTE ADULT - SUBJECTIVE AND OBJECTIVE BOX
Patient is a 76y old  Male who presents with a chief complaint of renal colic (01 Aug 2021 10:30)    Being followed by ID for UTI    Interval history:events noted  Hypotensive-was transferred to MICU yesterday  needed pressors  now off  feels well  no urinary complaints  No acute events      ROS:  No cough,SOB,CP  No N/V/D./abd pain  No other complaints      Antimicrobials:    piperacillin/tazobactam IVPB.. 3.375 Gram(s) IV Intermittent every 8 hours    Other medications reviewed    Vital Signs Last 24 Hrs  T(C): 36.9 (08-01-21 @ 11:00), Max: 37.3 (08-01-21 @ 00:00)  T(F): 98.5 (08-01-21 @ 11:00), Max: 99.1 (08-01-21 @ 00:00)  HR: 66 (08-01-21 @ 11:45) (61 - 91)  BP: 115/58 (08-01-21 @ 11:45) (75/53 - 131/60)  BP(mean): 81 (08-01-21 @ 11:45) (60 - 90)  RR: 17 (08-01-21 @ 11:45) (14 - 35)  SpO2: 93% (08-01-21 @ 11:45) (92% - 98%)    Physical Exam:        HEENT PERRLA EOMI    No oral exudate or erythema    Chest Good AE,CTA    CVS RRR S1 S2 WNl No murmur or rub or gallop    Abd obes esoft BS normal No tenderness no masses    IV site no erythema tenderness or discharge    CNS AAO X 3 no focal    Lab Data:                          13.7   26.44 )-----------( 183      ( 01 Aug 2021 00:39 )             40.4     WBC Count: 26.44 (08-01-21 @ 00:39)  WBC Count: 26.42 (07-31-21 @ 17:23)  WBC Count: 8.72 (07-31-21 @ 03:25)  WBC Count: 7.65 (07-29-21 @ 11:41)    08-01    140  |  109<H>  |  32<H>  ----------------------------<  137<H>  3.9   |  20<L>  |  1.40<H>    Creatinine, Serum: 1.40 mg/dL (08-01-21 @ 00:43)  Creatinine, Serum: 1.51 mg/dL (07-31-21 @ 17:23)  Creatinine, Serum: 1.78 mg/dL (07-31-21 @ 03:25)  Creatinine, Serum: 1.15 mg/dL (07-29-21 @ 11:41)    Ca    9.4      01 Aug 2021 00:43  Phos  2.2     08-01  Mg     2.0     08-01    TPro  6.2  /  Alb  3.3  /  TBili  0.4  /  DBili  x   /  AST  29  /  ALT  22  /  AlkPhos  58  08-01        Culture - Blood (collected 31 Jul 2021 10:16)  Source: .Blood Blood  Preliminary Report (01 Aug 2021 11:01):    No growth to date.    Culture - Blood (collected 31 Jul 2021 10:15)  Source: .Blood Blood  Preliminary Report (01 Aug 2021 11:01):    No growth to date.        < from: Xray Chest 1 View AP/PA (07.31.21 @ 06:37) >    IMPRESSION:  Clear lungs.    --- End of Report ---          < end of copied text >  < from: CT Abdomen and Pelvis w/ IV Cont (07.31.21 @ 04:20) >    IMPRESSION:  Mild to moderate left hydroureter to the level of an obstructing 8 mm calculusin the proximal left ureter.          < end of copied text >

## 2021-08-01 NOTE — PROGRESS NOTE ADULT - ATTENDING COMMENTS
Critically ill patient with frequent bedside visits. Patient seen and examined on rounds and plan of care discussed with team. In summary, this is a 77 y/o M with PMH of afib on eliquis (dx in his 20s, gets frequent cardioversions, was at hospital for cardioversion the day before admission), prostate ca s/p resection presenting with L flank pain that started the evening before admission, noted to have a obstructing stone and associated hydro. Patient also with sepsis. He underwent L ureteral stent placement/cystoscopy. Post operative with profound hypotension, likely septic shock. Transferred to MICU for vasoplegic shock.     - Blood culture negative to date.   - Continue dc drip for vasopressor support. Patient with A-line for hemodynamic monitoring. Remove A line today, titrate of intravenous vasopressor. Continue oral midodrine.   - Continue monitoring for A fib  - Antibiotics per ID.   - Full Code.  - On eliquis for VTE Px.

## 2021-08-02 LAB
-  AMIKACIN: SIGNIFICANT CHANGE UP
-  AMIKACIN: SIGNIFICANT CHANGE UP
-  AMOXICILLIN/CLAVULANIC ACID: SIGNIFICANT CHANGE UP
-  AMOXICILLIN/CLAVULANIC ACID: SIGNIFICANT CHANGE UP
-  AMPICILLIN/SULBACTAM: SIGNIFICANT CHANGE UP
-  AMPICILLIN/SULBACTAM: SIGNIFICANT CHANGE UP
-  AMPICILLIN: SIGNIFICANT CHANGE UP
-  AMPICILLIN: SIGNIFICANT CHANGE UP
-  AZTREONAM: SIGNIFICANT CHANGE UP
-  AZTREONAM: SIGNIFICANT CHANGE UP
-  CEFAZOLIN: SIGNIFICANT CHANGE UP
-  CEFAZOLIN: SIGNIFICANT CHANGE UP
-  CEFEPIME: SIGNIFICANT CHANGE UP
-  CEFEPIME: SIGNIFICANT CHANGE UP
-  CEFOXITIN: SIGNIFICANT CHANGE UP
-  CEFOXITIN: SIGNIFICANT CHANGE UP
-  CEFTRIAXONE: SIGNIFICANT CHANGE UP
-  CEFTRIAXONE: SIGNIFICANT CHANGE UP
-  CIPROFLOXACIN: SIGNIFICANT CHANGE UP
-  CIPROFLOXACIN: SIGNIFICANT CHANGE UP
-  ERTAPENEM: SIGNIFICANT CHANGE UP
-  ERTAPENEM: SIGNIFICANT CHANGE UP
-  GENTAMICIN: SIGNIFICANT CHANGE UP
-  GENTAMICIN: SIGNIFICANT CHANGE UP
-  IMIPENEM: SIGNIFICANT CHANGE UP
-  IMIPENEM: SIGNIFICANT CHANGE UP
-  LEVOFLOXACIN: SIGNIFICANT CHANGE UP
-  LEVOFLOXACIN: SIGNIFICANT CHANGE UP
-  MEROPENEM: SIGNIFICANT CHANGE UP
-  MEROPENEM: SIGNIFICANT CHANGE UP
-  NITROFURANTOIN: SIGNIFICANT CHANGE UP
-  PIPERACILLIN/TAZOBACTAM: SIGNIFICANT CHANGE UP
-  PIPERACILLIN/TAZOBACTAM: SIGNIFICANT CHANGE UP
-  TIGECYCLINE: SIGNIFICANT CHANGE UP
-  TIGECYCLINE: SIGNIFICANT CHANGE UP
-  TOBRAMYCIN: SIGNIFICANT CHANGE UP
-  TOBRAMYCIN: SIGNIFICANT CHANGE UP
-  TRIMETHOPRIM/SULFAMETHOXAZOLE: SIGNIFICANT CHANGE UP
-  TRIMETHOPRIM/SULFAMETHOXAZOLE: SIGNIFICANT CHANGE UP
ALBUMIN SERPL ELPH-MCNC: 2.6 G/DL — LOW (ref 3.3–5)
ALP SERPL-CCNC: 63 U/L — SIGNIFICANT CHANGE UP (ref 40–120)
ALT FLD-CCNC: 19 U/L — SIGNIFICANT CHANGE UP (ref 10–45)
ANION GAP SERPL CALC-SCNC: 15 MMOL/L — SIGNIFICANT CHANGE UP (ref 5–17)
AST SERPL-CCNC: 28 U/L — SIGNIFICANT CHANGE UP (ref 10–40)
BASOPHILS # BLD AUTO: 0.05 K/UL — SIGNIFICANT CHANGE UP (ref 0–0.2)
BASOPHILS NFR BLD AUTO: 0.3 % — SIGNIFICANT CHANGE UP (ref 0–2)
BILIRUB SERPL-MCNC: 0.6 MG/DL — SIGNIFICANT CHANGE UP (ref 0.2–1.2)
BUN SERPL-MCNC: 28 MG/DL — HIGH (ref 7–23)
CALCIUM SERPL-MCNC: 9.1 MG/DL — SIGNIFICANT CHANGE UP (ref 8.4–10.5)
CHLORIDE SERPL-SCNC: 99 MMOL/L — SIGNIFICANT CHANGE UP (ref 96–108)
CO2 SERPL-SCNC: 17 MMOL/L — LOW (ref 22–31)
CREAT SERPL-MCNC: 1.34 MG/DL — HIGH (ref 0.5–1.3)
CULTURE RESULTS: SIGNIFICANT CHANGE UP
CULTURE RESULTS: SIGNIFICANT CHANGE UP
EOSINOPHIL # BLD AUTO: 0.21 K/UL — SIGNIFICANT CHANGE UP (ref 0–0.5)
EOSINOPHIL NFR BLD AUTO: 1.2 % — SIGNIFICANT CHANGE UP (ref 0–6)
GLUCOSE SERPL-MCNC: 304 MG/DL — HIGH (ref 70–99)
HCT VFR BLD CALC: 41 % — SIGNIFICANT CHANGE UP (ref 39–50)
HGB BLD-MCNC: 13.6 G/DL — SIGNIFICANT CHANGE UP (ref 13–17)
IMM GRANULOCYTES NFR BLD AUTO: 2.1 % — HIGH (ref 0–1.5)
LYMPHOCYTES # BLD AUTO: 0.66 K/UL — LOW (ref 1–3.3)
LYMPHOCYTES # BLD AUTO: 3.9 % — LOW (ref 13–44)
MAGNESIUM SERPL-MCNC: 2 MG/DL — SIGNIFICANT CHANGE UP (ref 1.6–2.6)
MCHC RBC-ENTMCNC: 32 PG — SIGNIFICANT CHANGE UP (ref 27–34)
MCHC RBC-ENTMCNC: 33.2 GM/DL — SIGNIFICANT CHANGE UP (ref 32–36)
MCV RBC AUTO: 96.5 FL — SIGNIFICANT CHANGE UP (ref 80–100)
METHOD TYPE: SIGNIFICANT CHANGE UP
METHOD TYPE: SIGNIFICANT CHANGE UP
MONOCYTES # BLD AUTO: 1.35 K/UL — HIGH (ref 0–0.9)
MONOCYTES NFR BLD AUTO: 8 % — SIGNIFICANT CHANGE UP (ref 2–14)
NEUTROPHILS # BLD AUTO: 14.33 K/UL — HIGH (ref 1.8–7.4)
NEUTROPHILS NFR BLD AUTO: 84.5 % — HIGH (ref 43–77)
NRBC # BLD: 0 /100 WBCS — SIGNIFICANT CHANGE UP (ref 0–0)
ORGANISM # SPEC MICROSCOPIC CNT: SIGNIFICANT CHANGE UP
PHOSPHATE SERPL-MCNC: 1.8 MG/DL — LOW (ref 2.5–4.5)
PLATELET # BLD AUTO: 147 K/UL — LOW (ref 150–400)
POTASSIUM SERPL-MCNC: 3.7 MMOL/L — SIGNIFICANT CHANGE UP (ref 3.5–5.3)
POTASSIUM SERPL-SCNC: 3.7 MMOL/L — SIGNIFICANT CHANGE UP (ref 3.5–5.3)
PROT SERPL-MCNC: 6 G/DL — SIGNIFICANT CHANGE UP (ref 6–8.3)
RBC # BLD: 4.25 M/UL — SIGNIFICANT CHANGE UP (ref 4.2–5.8)
RBC # FLD: 14.6 % — HIGH (ref 10.3–14.5)
SODIUM SERPL-SCNC: 131 MMOL/L — LOW (ref 135–145)
SPECIMEN SOURCE: SIGNIFICANT CHANGE UP
SPECIMEN SOURCE: SIGNIFICANT CHANGE UP
WBC # BLD: 16.96 K/UL — HIGH (ref 3.8–10.5)
WBC # FLD AUTO: 16.96 K/UL — HIGH (ref 3.8–10.5)

## 2021-08-02 PROCEDURE — 99232 SBSQ HOSP IP/OBS MODERATE 35: CPT

## 2021-08-02 RX ORDER — METOPROLOL TARTRATE 50 MG
50 TABLET ORAL DAILY
Refills: 0 | Status: DISCONTINUED | OUTPATIENT
Start: 2021-08-02 | End: 2021-08-04

## 2021-08-02 RX ADMIN — MIDODRINE HYDROCHLORIDE 10 MILLIGRAM(S): 2.5 TABLET ORAL at 11:46

## 2021-08-02 RX ADMIN — MIDODRINE HYDROCHLORIDE 10 MILLIGRAM(S): 2.5 TABLET ORAL at 00:13

## 2021-08-02 RX ADMIN — MIDODRINE HYDROCHLORIDE 10 MILLIGRAM(S): 2.5 TABLET ORAL at 18:07

## 2021-08-02 RX ADMIN — PIPERACILLIN AND TAZOBACTAM 25 GRAM(S): 4; .5 INJECTION, POWDER, LYOPHILIZED, FOR SOLUTION INTRAVENOUS at 11:47

## 2021-08-02 RX ADMIN — Medication 83.33 MILLIMOLE(S): at 14:16

## 2021-08-02 RX ADMIN — PIPERACILLIN AND TAZOBACTAM 25 GRAM(S): 4; .5 INJECTION, POWDER, LYOPHILIZED, FOR SOLUTION INTRAVENOUS at 18:06

## 2021-08-02 RX ADMIN — PIPERACILLIN AND TAZOBACTAM 25 GRAM(S): 4; .5 INJECTION, POWDER, LYOPHILIZED, FOR SOLUTION INTRAVENOUS at 00:13

## 2021-08-02 RX ADMIN — PANTOPRAZOLE SODIUM 40 MILLIGRAM(S): 20 TABLET, DELAYED RELEASE ORAL at 05:26

## 2021-08-02 RX ADMIN — APIXABAN 5 MILLIGRAM(S): 2.5 TABLET, FILM COATED ORAL at 18:12

## 2021-08-02 RX ADMIN — APIXABAN 5 MILLIGRAM(S): 2.5 TABLET, FILM COATED ORAL at 05:26

## 2021-08-02 NOTE — PROGRESS NOTE ADULT - ASSESSMENT
76 m with    Hypotension 2/2 septic shock resolved  - titrating off phenylephrine  - midodrine 10mg Q8h    Ureteral stone and UTI  - postop care  - antibiotic  - Urology follow  - ID follow  - pain control    A Fib  - s/p cardioversion  - continue Rx  - cardiology evaluation recalled re BB and Diltiazem    Prostate Ca  - stable    DVT prophylaxis  - Chance Billingsley MD pager [phone]

## 2021-08-02 NOTE — PROGRESS NOTE ADULT - SUBJECTIVE AND OBJECTIVE BOX
Patient is a 76y old  Male who presents with a chief complaint of renal colic (01 Aug 2021 10:30)    Out of ICU  SUBJECTIVE / OVERNIGHT EVENTS: Comfortable without new complaints.   Review of Systems  chest pain no  palpitations no  sob no  nausea no  headache no    MEDICATIONS  (STANDING):  apixaban 5 milliGRAM(s) Oral every 12 hours  chlorhexidine 4% Liquid 1 Application(s) Topical <User Schedule>  midodrine 10 milliGRAM(s) Oral every 8 hours  pantoprazole    Tablet 40 milliGRAM(s) Oral before breakfast  piperacillin/tazobactam IVPB.. 3.375 Gram(s) IV Intermittent every 8 hours    MEDICATIONS  (PRN):  HYDROmorphone  Injectable 0.25 milliGRAM(s) IV Push every 10 minutes PRN Moderate Pain (4 - 6)      Vital Signs Last 24 Hrs  T(C): 37.9 (02 Aug 2021 14:19), Max: 37.9 (02 Aug 2021 14:19)  T(F): 100.3 (02 Aug 2021 14:19), Max: 100.3 (02 Aug 2021 14:19)  HR: 80 (02 Aug 2021 14:19) (69 - 80)  BP: 154/84 (02 Aug 2021 14:19) (95/62 - 154/84)  BP(mean): 86 (02 Aug 2021 03:00) (71 - 90)  RR: 18 (02 Aug 2021 14:19) (18 - 26)  SpO2: 96% (02 Aug 2021 14:19) (92% - 96%)    PHYSICAL EXAM:  GENERAL: NAD, well-developed  HEAD:  Atraumatic, Normocephalic  EYES: EOMI, PERRLA, conjunctiva and sclera clear  NECK: Supple, No JVD  CHEST/LUNG: Clear to auscultation bilaterally; No wheeze  HEART: Regular rate and rhythm; No murmurs, rubs, or gallops  ABDOMEN: Soft, Nontender, Nondistended; Bowel sounds present  EXTREMITIES:  2+ Peripheral Pulses, No clubbing, cyanosis, or edema  PSYCH: AAOx3  NEUROLOGY: non-focal  SKIN: No rashes or lesions    LABS:                        13.6   16.96 )-----------( 147      ( 02 Aug 2021 04:28 )             41.0     08-02    131<L>  |  99  |  28<H>  ----------------------------<  304<H>  3.7   |  17<L>  |  1.34<H>    Ca    9.1      02 Aug 2021 04:28  Phos  1.8     08-02  Mg     2.0     08-02    TPro  6.0  /  Alb  2.6<L>  /  TBili  0.6  /  DBili  x   /  AST  28  /  ALT  19  /  AlkPhos  63  08-02              Culture - Urine (collected 31 Jul 2021 16:38)  Source: Kidney Kidney  Preliminary Report (01 Aug 2021 21:37):    Numerous Escherichia coli    Culture - Urine (collected 31 Jul 2021 10:19)  Source: Clean Catch Clean Catch (Midstream)  Preliminary Report (01 Aug 2021 21:35):    >100,000 CFU/ml Escherichia coli    Culture - Blood (collected 31 Jul 2021 10:16)  Source: .Blood Blood  Preliminary Report (01 Aug 2021 11:01):    No growth to date.    Culture - Blood (collected 31 Jul 2021 10:15)  Source: .Blood Blood  Preliminary Report (01 Aug 2021 11:01):    No growth to date.        RADIOLOGY & ADDITIONAL TESTS:    Imaging Personally Reviewed:    Consultant(s) Notes Reviewed:      Care Discussed with Consultants/Other Providers:

## 2021-08-02 NOTE — CONSULT NOTE ADULT - SUBJECTIVE AND OBJECTIVE BOX
Patient seen and evaluated at bedside    Chief Complaint:    HPI:  75 yo M with hx of afib on Eliquis s/p cardioversion on 7/30, prostate ca s/p resection presented with L flank pain 2/2 stone now s/p stent placement and c/b septic shock. Was in the MICU with pressor requirement and now on medicine floor. Weaned off of Anish gtt but still on midodrine started in the MICU. Primary team's question is when to start his home metoprolol and diltiazem for rate control for afib. Most recent EKG on 7/30 showed sinus rhythm. Currently, patient is on midodrine 10 mg Q8H and not on any BB or dilt. His HR is 70-80s and SBP is 130-150s.    ROS otherwise negative    PMHx:   Paroxysmal atrial fibrillation  Prostate cancer    PSHx:   History of robot-assisted laparoscopic radical prostatectomy    Allergies:  quinidine (Other)  sulfa drugs (Other)    Current Medications:   apixaban 5 milliGRAM(s) Oral every 12 hours  chlorhexidine 4% Liquid 1 Application(s) Topical <User Schedule>  HYDROmorphone  Injectable 0.25 milliGRAM(s) IV Push every 10 minutes PRN  midodrine 10 milliGRAM(s) Oral every 8 hours  pantoprazole    Tablet 40 milliGRAM(s) Oral before breakfast  piperacillin/tazobactam IVPB.. 3.375 Gram(s) IV Intermittent every 8 hours    FAMILY HISTORY:  No pertinent family history in first degree relatives    Social History:  Smoking History:  Alcohol Use:  Drug Use:    REVIEW OF SYSTEMS:  Constitutional:     [x ] negative [ ] fevers [ ] chills [ ] weight loss [ ] weight gain  HEENT:                  [x ] negative [ ] dry eyes [ ] eye irritation [ ] postnasal drip [ ] nasal congestion  CV:                         [ x] negative  [ ] chest pain [ ] orthopnea [ ] palpitations [ ] murmur  Resp:                     [x ] negative [ ] cough [ ] shortness of breath [ ] dyspnea [ ] wheezing [ ] sputum [ ]hemoptysis  GI:                          [ x] negative [ ] nausea [ ] vomiting [ ] diarrhea [ ] constipation [ ] abd pain [ ] dysphagia   :                        [ x] negative [ ] dysuria [ ] nocturia [ ] hematuria [ ] increased urinary frequency  Musculoskeletal: [x ] negative [ ] back pain [ ] myalgias [ ] arthralgias [ ] fracture  Skin:                       [ x] negative [ ] rash [ ] itch  Neurological:        [ x] negative [ ] headache [ ] dizziness [ ] syncope [ ] weakness [ ] numbness  Psychiatric:           [ x] negative [ ] anxiety [ ] depression  Endocrine:            [ x] negative [ ] diabetes [ ] thyroid problem  Heme/Lymph:      [ x] negative [ ] anemia [ ] bleeding problem  Allergic/Immune: [ x] negative [ ] itchy eyes [ ] nasal discharge [ ] hives [ ] angioedema    [ x] All other systems negative  [ ] Unable to assess ROS due to    Physical Exam:  T(F): 100.3 (08-02), Max: 100.3 (08-02)  HR: 80 (08-02) (69 - 80)  BP: 154/84 (08-02) (95/62 - 154/84)  RR: 18 (08-02)  SpO2: 96% (08-02)  GENERAL: No acute distress, well-developed  HEAD:  Atraumatic, Normocephalic  ENT: EOMI, PERRLA, conjunctiva and sclera clear, Neck supple, No JVD, moist mucosa  CHEST/LUNG: Clear to auscultation bilaterally; No wheeze, equal breath sounds bilaterally   BACK: No spinal tenderness  HEART: Regular rate and rhythm; No murmurs, rubs, or gallops  ABDOMEN: Soft, Nontender, Nondistended; Bowel sounds present  EXTREMITIES:  No clubbing, cyanosis, or edema  PSYCH: Nl behavior, nl affect  NEUROLOGY: AAOx3, non-focal, cranial nerves intact  SKIN: Normal color, No rashes or lesions  LINES:    Cardiovascular Diagnostic Testing:    ECG: Personally reviewed:  < from: 12 Lead ECG (07.31.21 @ 06:09) >  NORMAL SINUS RHYTHM  LEFT ATRIAL ABNORMALITIES  WHEN COMPARED WITH ECG OF 29-JUL-2021 11:14,  sinus has replaced atrial fibrillation    Stress Test: Stress echo 3/3/2021, exercise on mariluz protocol for 9 minutes achieving 85 % of MPHR. No evidence   of ischemic on EKG/Echo.   Echo: 3/3/2021  Normal LV systolic function with EF of 55 %.  Normal RV size and systolic function.  No significant valvular abnormalities.  Mild left atrial dilatation          CXR: Personally reviewed    Labs: Personally reviewed                        13.6   16.96 )-----------( 147      ( 02 Aug 2021 04:28 )             41.0     08-02    131<L>  |  99  |  28<H>  ----------------------------<  304<H>  3.7   |  17<L>  |  1.34<H>    Ca    9.1      02 Aug 2021 04:28  Phos  1.8     08-02  Mg     2.0     08-02    TPro  6.0  /  Alb  2.6<L>  /  TBili  0.6  /  DBili  x   /  AST  28  /  ALT  19  /  AlkPhos  63  08-02

## 2021-08-02 NOTE — PROGRESS NOTE ADULT - SUBJECTIVE AND OBJECTIVE BOX
Urology Progress Note    Overnight Events:  No acute urologic events overnight. resting in bed. nO specific new  complaints.    Review of Systems:   Constitutional: No weight loss, no weakness  CV: No chest pain, no chest pressure  Pulm: No shortness of breath, cough, or sputum    Physical Exam:  Vital signs  T(C): 36.8 (08-02-21 @ 09:40), Max: 37.1 (08-02-21 @ 05:06)  HR: 76 (08-02-21 @ 09:40)  BP: 128/77 (08-02-21 @ 09:40)  SpO2: 95% (08-02-21 @ 09:40)  Wt(kg): --    Gen: No acute distress. Normal mood  Abd: soft, non-tender, non-distended  : Non-palpable bladder    Labs:      08-02 @ 04:28    WBC 16.96 / Hct 41.0  / SCr 1.34     08-01 @ 00:43    WBC --    / Hct --    / SCr 1.40     08-02    131<L>  |  99  |  28<H>  ----------------------------<  304<H>  3.7   |  17<L>  |  1.34<H>    Ca    9.1      02 Aug 2021 04:28  Phos  1.8     08-02  Mg     2.0     08-02    TPro  6.0  /  Alb  2.6<L>  /  TBili  0.6  /  DBili  x   /  AST  28  /  ALT  19  /  AlkPhos  63  08-02       Urology Progress Note    Overnight Events:  No acute urologic events overnight. resting in chair. nO specific new  complaints.    Review of Systems:   Constitutional: No weight loss, no weakness  CV: No chest pain, no chest pressure  Pulm: No shortness of breath, cough, or sputum    Physical Exam:  Vital signs  T(C): 36.8 (08-02-21 @ 09:40), Max: 37.1 (08-02-21 @ 05:06)  HR: 76 (08-02-21 @ 09:40)  BP: 128/77 (08-02-21 @ 09:40)  SpO2: 95% (08-02-21 @ 09:40)  Wt(kg): --    Gen: No acute distress. Normal mood  Abd: soft, non-tender, non-distended  : Non-palpable bladder    Labs:      08-02 @ 04:28    WBC 16.96 / Hct 41.0  / SCr 1.34     08-01 @ 00:43    WBC --    / Hct --    / SCr 1.40     08-02    131<L>  |  99  |  28<H>  ----------------------------<  304<H>  3.7   |  17<L>  |  1.34<H>    Ca    9.1      02 Aug 2021 04:28  Phos  1.8     08-02  Mg     2.0     08-02    TPro  6.0  /  Alb  2.6<L>  /  TBili  0.6  /  DBili  x   /  AST  28  /  ALT  19  /  AlkPhos  63  08-02

## 2021-08-02 NOTE — CONSULT NOTE ADULT - ASSESSMENT
75 yo M with hx of afib s/p recent DCCV presented with urosepsis now resolved but on midodrine. Cardiology consulted for when to re-start home metoprolol and diltiazem.     #Afib  - now in NSR  - advise weaning off from midodrine to 10 BID, then daily as BP tolerates  - once SBP is stable off midodrine, would re-start metoprolol succinate at 50 mg daily  - patient likely will not need diltiazem given no longer in rapid afib  - important to continue Eliquis given recent DCCV    Susan Cross MD  Cardiology Fellow - PGY 4  Text or Call: [phone]  For all New Consults and Questions:  [email or web address]   Login: [email or web address] 75 yo M with hx of afib s/p recent DCCV presented with urosepsis now resolved but on midodrine. Cardiology consulted for when to re-start home metoprolol and diltiazem.     #Afib  - now in NSR  - advise weaning off from midodrine gradually  - once SBP is stable off midodrine, would re-start metoprolol succinate at 50 mg daily  - patient likely will not need diltiazem given no longer in rapid afib  - important to continue Eliquis given recent DCCV    Susan Cross MD  Cardiology Fellow - PGY 4  Text or Call: [phone]  For all New Consults and Questions:  [email or web address]   Login: [email or web address]

## 2021-08-02 NOTE — PROGRESS NOTE ADULT - ASSESSMENT
75 y/o M with PMH of afib on eliquis (had cardioversion yesterday), prostate ca s/p resection presenting with L flank pain that started in the evening. Patient states pain radiates to his groin. Has associated n/v. States pain is similar to stones in past. No fevers, chills, cp, sob, LE swelling   CT with obstructing 8 mm L stone  s/p  L ureteral stent.  Hypotensive post procedure SIRS/sepsis  blood Cx negative  Urine cx pending  Now off pressors though on midodrine  No s/s any other foci    urine  Cultures E coli-sensi pending   Rec:      A) UTI   Pyelo   Nephrolithiasis s/p stent  No recent abx  prior E coli in urine pan sensitive  SIRS  follow urine cx sensi   For now continue zosyn  Follow clinically  If leucocytosis remains or SIRS continues would recommend repeat imaging     B) Nephrolithiasis  s/p stent  further plan per urology    C) leucocytosis  ? due to above  Plan as detailed above     Will tailor plan for ID issues  per course,results.Will defer to primary team on management of other issues.  Assessment, plan and recommendations as detailed above were discussed with the primary    team.  Will Follow.  Beeper [phone] [facility] [id number].   Wknd/afterhours/No response-[phone] or Fellow on call

## 2021-08-02 NOTE — PROGRESS NOTE ADULT - ASSESSMENT
F with renal stone, hydronephrosis    -IR placement of nephrostomy tube for renal decompression  -Timing of tube placement per IR  -Hydration  -Monitor urine output Cr electrolytes  -If signs and symptoms of sepsis, page urology immediately to discuss need for emergent renal decompression with nephrostomy tube placement  -Definitive stone treatment as outpatient with her urologist M with ureteral stone, hydronephrosis, UTI    -F/u cultures  -Abx per primary  -hydration  -maintain ureteral stent  -No further planned  intervention during this admission  -Outpatient  followup with his urologist for definitive stone treatment  -Pain control  -Ok for discharge once cleared by medicine--no further  intervention during this admission    Thank you for the consult. Please call with questions. Urology signing off.

## 2021-08-02 NOTE — PROGRESS NOTE ADULT - SUBJECTIVE AND OBJECTIVE BOX
Patient is a 76y old  Male who presents with a chief complaint of renal colic (01 Aug 2021 10:30)    Being followed by ID for UTI, renal stone     Interval history:feels well  transferred to floors  No flank pain or dysuria  No acute events      ROS:  No cough,SOB,CP  No N/V/D./abd pain  No other complaints      Antimicrobials:    piperacillin/tazobactam IVPB.. 3.375 Gram(s) IV Intermittent every 8 hours    Other medications reviewed    Vital Signs Last 24 Hrs  T(C): 36.8 (08-02-21 @ 09:40), Max: 37.1 (08-02-21 @ 05:06)  T(F): 98.3 (08-02-21 @ 09:40), Max: 98.8 (08-02-21 @ 05:06)  HR: 76 (08-02-21 @ 09:40) (69 - 80)  BP: 128/77 (08-02-21 @ 09:40) (88/52 - 141/76)  BP(mean): 86 (08-02-21 @ 03:00) (67 - 90)  RR: 18 (08-02-21 @ 09:40) (18 - 32)  SpO2: 95% (08-02-21 @ 09:40) (92% - 96%)    Physical Exam:    HEENT PERRLA EOMI    No oral exudate or erythema    Chest Good AE,CTA    CVS RRR S1 S2 WNl No murmur or rub or gallop    Abd obes esoft BS normal No tenderness no masses    IV site no erythema tenderness or discharge    CNS AAO X 3 no focal    Lab Data:                          13.6   16.96 )-----------( 147      ( 02 Aug 2021 04:28 )             41.0     WBC Count: 16.96 (08-02-21 @ 04:28)  WBC Count: 26.44 (08-01-21 @ 00:39)  WBC Count: 26.42 (07-31-21 @ 17:23)  WBC Count: 8.72 (07-31-21 @ 03:25)  WBC Count: 7.65 (07-29-21 @ 11:41)    08-02    131<L>  |  99  |  28<H>  ----------------------------<  304<H>  3.7   |  17<L>  |  1.34<H>    Ca    9.1      02 Aug 2021 04:28  Phos  1.8     08-02  Mg     2.0     08-02    TPro  6.0  /  Alb  2.6<L>  /  TBili  0.6  /  DBili  x   /  AST  28  /  ALT  19  /  AlkPhos  63  08-02        Culture - Urine (collected 31 Jul 2021 16:38)  Source: Kidney Kidney  Preliminary Report (01 Aug 2021 21:37):    Numerous Escherichia coli    Culture - Urine (collected 31 Jul 2021 10:19)  Source: Clean Catch Clean Catch (Midstream)  Preliminary Report (01 Aug 2021 21:35):    >100,000 CFU/ml Escherichia coli    Culture - Blood (collected 31 Jul 2021 10:16)  Source: .Blood Blood  Preliminary Report (01 Aug 2021 11:01):    No growth to date.    Culture - Blood (collected 31 Jul 2021 10:15)  Source: .Blood Blood  Preliminary Report (01 Aug 2021 11:01):    No growth to date.          < from: Xray Chest 1 View AP/PA (07.31.21 @ 06:37) >    IMPRESSION:  Clear lungs.    --- End of Report ---        < end of copied text >

## 2021-08-03 LAB
ANION GAP SERPL CALC-SCNC: 10 MMOL/L — SIGNIFICANT CHANGE UP (ref 5–17)
APPEARANCE UR: ABNORMAL
BACTERIA # UR AUTO: NEGATIVE — SIGNIFICANT CHANGE UP
BILIRUB UR-MCNC: NEGATIVE — SIGNIFICANT CHANGE UP
BUN SERPL-MCNC: 24 MG/DL — HIGH (ref 7–23)
CALCIUM SERPL-MCNC: 9.8 MG/DL — SIGNIFICANT CHANGE UP (ref 8.4–10.5)
CHLORIDE SERPL-SCNC: 105 MMOL/L — SIGNIFICANT CHANGE UP (ref 96–108)
CO2 SERPL-SCNC: 20 MMOL/L — LOW (ref 22–31)
COLOR SPEC: ABNORMAL
CREAT SERPL-MCNC: 1.32 MG/DL — HIGH (ref 0.5–1.3)
DIFF PNL FLD: ABNORMAL
EPI CELLS # UR: 1 /HPF — SIGNIFICANT CHANGE UP
GLUCOSE SERPL-MCNC: 97 MG/DL — SIGNIFICANT CHANGE UP (ref 70–99)
GLUCOSE UR QL: NEGATIVE — SIGNIFICANT CHANGE UP
HCT VFR BLD CALC: 40.9 % — SIGNIFICANT CHANGE UP (ref 39–50)
HGB BLD-MCNC: 13.6 G/DL — SIGNIFICANT CHANGE UP (ref 13–17)
HYALINE CASTS # UR AUTO: 1 /LPF — SIGNIFICANT CHANGE UP (ref 0–2)
KETONES UR-MCNC: NEGATIVE — SIGNIFICANT CHANGE UP
LEUKOCYTE ESTERASE UR-ACNC: ABNORMAL
MAGNESIUM SERPL-MCNC: 2.2 MG/DL — SIGNIFICANT CHANGE UP (ref 1.6–2.6)
MCHC RBC-ENTMCNC: 31.4 PG — SIGNIFICANT CHANGE UP (ref 27–34)
MCHC RBC-ENTMCNC: 33.3 GM/DL — SIGNIFICANT CHANGE UP (ref 32–36)
MCV RBC AUTO: 94.5 FL — SIGNIFICANT CHANGE UP (ref 80–100)
NITRITE UR-MCNC: NEGATIVE — SIGNIFICANT CHANGE UP
NRBC # BLD: 0 /100 WBCS — SIGNIFICANT CHANGE UP (ref 0–0)
PH UR: 7 — SIGNIFICANT CHANGE UP (ref 5–8)
PHOSPHATE SERPL-MCNC: 2.2 MG/DL — LOW (ref 2.5–4.5)
PLATELET # BLD AUTO: 179 K/UL — SIGNIFICANT CHANGE UP (ref 150–400)
POTASSIUM SERPL-MCNC: 3.8 MMOL/L — SIGNIFICANT CHANGE UP (ref 3.5–5.3)
POTASSIUM SERPL-SCNC: 3.8 MMOL/L — SIGNIFICANT CHANGE UP (ref 3.5–5.3)
PROT UR-MCNC: 100 — SIGNIFICANT CHANGE UP
RAPID RVP RESULT: SIGNIFICANT CHANGE UP
RBC # BLD: 4.33 M/UL — SIGNIFICANT CHANGE UP (ref 4.2–5.8)
RBC # FLD: 14.3 % — SIGNIFICANT CHANGE UP (ref 10.3–14.5)
RBC CASTS # UR COMP ASSIST: 1724 /HPF — HIGH (ref 0–4)
SARS-COV-2 RNA SPEC QL NAA+PROBE: SIGNIFICANT CHANGE UP
SODIUM SERPL-SCNC: 135 MMOL/L — SIGNIFICANT CHANGE UP (ref 135–145)
SP GR SPEC: 1.01 — SIGNIFICANT CHANGE UP (ref 1.01–1.02)
UROBILINOGEN FLD QL: NEGATIVE — SIGNIFICANT CHANGE UP
WBC # BLD: 12.18 K/UL — HIGH (ref 3.8–10.5)
WBC # FLD AUTO: 12.18 K/UL — HIGH (ref 3.8–10.5)
WBC UR QL: 21 /HPF — HIGH (ref 0–5)

## 2021-08-03 PROCEDURE — 93306 TTE W/DOPPLER COMPLETE: CPT | Mod: 26

## 2021-08-03 PROCEDURE — 99233 SBSQ HOSP IP/OBS HIGH 50: CPT

## 2021-08-03 PROCEDURE — 99232 SBSQ HOSP IP/OBS MODERATE 35: CPT

## 2021-08-03 PROCEDURE — 71046 X-RAY EXAM CHEST 2 VIEWS: CPT | Mod: 26

## 2021-08-03 RX ADMIN — Medication 50 MILLIGRAM(S): at 05:17

## 2021-08-03 RX ADMIN — APIXABAN 5 MILLIGRAM(S): 2.5 TABLET, FILM COATED ORAL at 05:17

## 2021-08-03 RX ADMIN — PIPERACILLIN AND TAZOBACTAM 25 GRAM(S): 4; .5 INJECTION, POWDER, LYOPHILIZED, FOR SOLUTION INTRAVENOUS at 09:50

## 2021-08-03 RX ADMIN — APIXABAN 5 MILLIGRAM(S): 2.5 TABLET, FILM COATED ORAL at 17:53

## 2021-08-03 RX ADMIN — PIPERACILLIN AND TAZOBACTAM 25 GRAM(S): 4; .5 INJECTION, POWDER, LYOPHILIZED, FOR SOLUTION INTRAVENOUS at 01:50

## 2021-08-03 RX ADMIN — PANTOPRAZOLE SODIUM 40 MILLIGRAM(S): 20 TABLET, DELAYED RELEASE ORAL at 05:17

## 2021-08-03 RX ADMIN — PIPERACILLIN AND TAZOBACTAM 25 GRAM(S): 4; .5 INJECTION, POWDER, LYOPHILIZED, FOR SOLUTION INTRAVENOUS at 17:52

## 2021-08-03 NOTE — CONSULT NOTE ADULT - CONSULT REASON
obstructing 8 mm calculus in the proximal left ureter
AFib / Urosepsis
UTI
When to start cardiac meds while on midodrine
RENAL COLIC

## 2021-08-03 NOTE — PROGRESS NOTE ADULT - SUBJECTIVE AND OBJECTIVE BOX
CARDIOLOGY FELLOW PROGRESS NOTE    Subjective:    No acute events overnight.     Tele: NSR    ROS negative.     Current Medications:   apixaban 5 milliGRAM(s) Oral every 12 hours  chlorhexidine 4% Liquid 1 Application(s) Topical <User Schedule>  HYDROmorphone  Injectable 0.25 milliGRAM(s) IV Push every 10 minutes PRN  metoprolol succinate ER 50 milliGRAM(s) Oral daily  pantoprazole    Tablet 40 milliGRAM(s) Oral before breakfast  piperacillin/tazobactam IVPB.. 3.375 Gram(s) IV Intermittent every 8 hours    Physical Exam:  T(F): 98.8 (08-03), Max: 100.3 (08-02)  HR: 75 (08-03) (75 - 88)  BP: 158/82 (08-03) (144/84 - 158/82)  RR: 18 (08-03)  SpO2: 98% (08-03)  GENERAL: No acute distress, well-developed  HEAD:  Atraumatic, Normocephalic  ENT: EOMI, PERRLA, conjunctiva and sclera clear, Neck supple, No JVD, moist mucosa  CHEST/LUNG: Clear to auscultation bilaterally; No wheeze, equal breath sounds bilaterally   BACK: No spinal tenderness  HEART: Regular rate and rhythm; No murmurs, rubs, or gallops  ABDOMEN: Soft, Nontender, Nondistended; Bowel sounds present  EXTREMITIES:  No clubbing, cyanosis, or edema  PSYCH: Nl behavior, nl affect  NEUROLOGY: AAOx3, non-focal, cranial nerves intact  SKIN: Normal color, No rashes or lesions    Cardiovascular Diagnostic Testing: personally reviewed    CXR: Personally reviewed    Labs: Personally reviewed                        13.6   12.18 )-----------( 179      ( 03 Aug 2021 07:37 )             40.9     08-03    135  |  105  |  24<H>  ----------------------------<  97  3.8   |  20<L>  |  1.32<H>    Ca    9.8      03 Aug 2021 07:37  Phos  2.2     08-03  Mg     2.2     08-03    TPro  6.0  /  Alb  2.6<L>  /  TBili  0.6  /  DBili  x   /  AST  28  /  ALT  19  /  AlkPhos  63  08-02

## 2021-08-03 NOTE — PROGRESS NOTE ADULT - SUBJECTIVE AND OBJECTIVE BOX
Patient is a 76y old  Male who presents with a chief complaint of Renal colic (02 Aug 2021 17:31)      SUBJECTIVE / OVERNIGHT EVENTS: Comfortable without new complaints.   Review of Systems  chest pain no  palpitations no  sob no  nausea no  headache no    MEDICATIONS  (STANDING):  apixaban 5 milliGRAM(s) Oral every 12 hours  chlorhexidine 4% Liquid 1 Application(s) Topical <User Schedule>  metoprolol succinate ER 50 milliGRAM(s) Oral daily  pantoprazole    Tablet 40 milliGRAM(s) Oral before breakfast  piperacillin/tazobactam IVPB.. 3.375 Gram(s) IV Intermittent every 8 hours    MEDICATIONS  (PRN):  HYDROmorphone  Injectable 0.25 milliGRAM(s) IV Push every 10 minutes PRN Moderate Pain (4 - 6)      Vital Signs Last 24 Hrs  T(C): 37.1 (03 Aug 2021 10:03), Max: 37.9 (03 Aug 2021 05:05)  T(F): 98.8 (03 Aug 2021 10:03), Max: 100.2 (03 Aug 2021 05:05)  HR: 75 (03 Aug 2021 10:03) (75 - 88)  BP: 158/82 (03 Aug 2021 10:03) (144/84 - 158/82)  BP(mean): --  RR: 18 (03 Aug 2021 10:03) (18 - 18)  SpO2: 98% (03 Aug 2021 10:03) (95% - 98%)    PHYSICAL EXAM:  GENERAL: NAD, well-developed  HEAD:  Atraumatic, Normocephalic  EYES: EOMI, PERRLA, conjunctiva and sclera clear  NECK: Supple, No JVD  CHEST/LUNG: Clear to auscultation bilaterally; No wheeze  HEART: Regular rate and rhythm; No murmurs, rubs, or gallops  ABDOMEN: Soft, Nontender, Nondistended; Bowel sounds present  EXTREMITIES:  2+ Peripheral Pulses, No clubbing, cyanosis, or edema  PSYCH: AAOx3  NEUROLOGY: non-focal  SKIN: No rashes or lesions    LABS:                        13.6   12.18 )-----------( 179      ( 03 Aug 2021 07:37 )             40.9     08-03    135  |  105  |  24<H>  ----------------------------<  97  3.8   |  20<L>  |  1.32<H>    Ca    9.8      03 Aug 2021 07:37  Phos  2.2     08-  Mg     2.2     08-03    TPro  6.0  /  Alb  2.6<L>  /  TBili  0.6  /  DBili  x   /  AST  28  /  ALT  19  /  AlkPhos  63  08-          Urinalysis Basic - ( 03 Aug 2021 10:26 )    Color: Light Orange / Appearance: Slightly Turbid / S.014 / pH: x  Gluc: x / Ketone: Negative  / Bili: Negative / Urobili: Negative   Blood: x / Protein: 100 / Nitrite: Negative   Leuk Esterase: Moderate / RBC: 1724 /hpf / WBC 21 /HPF   Sq Epi: x / Non Sq Epi: 1 /hpf / Bacteria: Negative        Culture - Urine (collected 2021 16:38)  Source: Kidney Kidney  Final Report (02 Aug 2021 18:58):    Numerous Escherichia coli  Organism: Escherichia coli (02 Aug 2021 18:58)  Organism: Escherichia coli (02 Aug 2021 18:58)        RADIOLOGY & ADDITIONAL TESTS:    Imaging Personally Reviewed:  < from: Xray Chest 2 Views PA/Lat (21 @ 11:16) >    IMPRESSION:  Clear lungs.    < end of copied text >    Consultant(s) Notes Reviewed:      Care Discussed with Consultants/Other Providers:

## 2021-08-03 NOTE — PROGRESS NOTE ADULT - ASSESSMENT
76 m with    Hypotension 2/2 septic shock resolved  - titrating off phenylephrine  - midodrine Taper.    Ureteral stone and UTI  - postop care  - antibiotic  - Urology follow  - ID follow  - pain control    A Fib  - s/p cardioversion  - continue Rx  - cardiology evaluation noted.   - Echo    Prostate Ca  - stable    DVT prophylaxis  - Chance Billingsley MD pager [phone]

## 2021-08-03 NOTE — PROGRESS NOTE ADULT - SUBJECTIVE AND OBJECTIVE BOX
Patient is a 76y old  Male who presents with a chief complaint of Renal colic (02 Aug 2021 17:31)    Being followed by ID for UTI, nephrolithiasis    Interval history:feels well thoughw as febrile overnight  No urinary complaints  NO IV site pain   No acute events      ROS:  No cough,SOB,CP  No N/V/D./abd pain  No other complaints      Antimicrobials:    piperacillin/tazobactam IVPB.. 3.375 Gram(s) IV Intermittent every 8 hours    Other medications reviewed    Vital Signs Last 24 Hrs  T(C): 37.1 (08-03-21 @ 10:03), Max: 37.9 (08-02-21 @ 14:19)  T(F): 98.8 (08-03-21 @ 10:03), Max: 100.3 (08-02-21 @ 14:19)  HR: 75 (08-03-21 @ 10:03) (75 - 88)  BP: 158/82 (08-03-21 @ 10:03) (144/84 - 158/82)  BP(mean): --  RR: 18 (08-03-21 @ 10:03) (18 - 18)  SpO2: 98% (08-03-21 @ 10:03) (95% - 98%)    Physical Exam:    HEENT PERRLA EOMI    No oral exudate or erythema    Chest Good AE,CTA    CVS RRR S1 S2 WNl No murmur or rub or gallop    Abd obes esoft BS normal No tenderness no masses    IV site no erythema tenderness or discharge    CNS AAO X 3 no focal    Lab Data:                          13.6   12.18 )-----------( 179      ( 03 Aug 2021 07:37 )             40.9     WBC Count: 12.18 (08-03-21 @ 07:37)  WBC Count: 16.96 (08-02-21 @ 04:28)  WBC Count: 26.44 (08-01-21 @ 00:39)  WBC Count: 26.42 (07-31-21 @ 17:23)  WBC Count: 8.72 (07-31-21 @ 03:25)  WBC Count: 7.65 (07-29-21 @ 11:41)    08-03    135  |  105  |  24<H>  ----------------------------<  97  3.8   |  20<L>  |  1.32<H>    Ca    9.8      03 Aug 2021 07:37  Phos  2.2     08-03  Mg     2.2     08-03    TPro  6.0  /  Alb  2.6<L>  /  TBili  0.6  /  DBili  x   /  AST  28  /  ALT  19  /  AlkPhos  63  08-02        Culture - Urine (collected 31 Jul 2021 16:38)  Source: Kidney Kidney  Final Report (02 Aug 2021 18:58):    Numerous Escherichia coli  Organism: Escherichia coli (02 Aug 2021 18:58)  Organism: Escherichia coli (02 Aug 2021 18:58)      -  Amikacin: S <=16      -  Amoxicillin/Clavulanic Acid: S <=8/4      -  Ampicillin: S <=8 These ampicillin results predict results for amoxicillin      -  Ampicillin/Sulbactam: S <=4/2 Enterobacter, Citrobacter, and Serratia may develop resistance during prolonged therapy (3-4 days)      -  Aztreonam: S <=4      -  Cefazolin: S <=2 (MIC_CL_COM_ENTERIC_CEFAZU) For uncomplicated UTI with K. pneumoniae, E. coli, or P. mirablis: JOSE <=16 is sensitive and JOSE >=32 is resistant. This also predicts results for oral agents cefaclor, cefdinir, cefpodoxime, cefprozil, cefuroxime axetil, cephalexin and locarbef for uncomplicated UTI. Note that some isolates may be susceptible to these agents while testing resistant to cefazolin.      -  Cefepime: S <=2      -  Cefoxitin: S <=8      -  Ceftriaxone: S <=1 Enterobacter, Citrobacter, and Serratia may develop resistance during prolonged therapy      -  Ciprofloxacin: S <=0.25      -  Ertapenem: S <=0.5      -  Gentamicin: S <=2      -  Imipenem: S <=1      -  Levofloxacin: S <=0.5      -  Meropenem: S <=1      -  Piperacillin/Tazobactam: S <=8      -  Tigecycline: S <=2      -  Tobramycin: S <=2      -  Trimethoprim/Sulfamethoxazole: S <=0.5/9.5      Method Type: JOSE    Culture - Urine (collected 31 Jul 2021 10:19)  Source: Clean Catch Clean Catch (Midstream)  Final Report (02 Aug 2021 19:48):    >100,000 CFU/ml Escherichia coli  Organism: Escherichia coli (02 Aug 2021 19:48)  Organism: Escherichia coli (02 Aug 2021 19:48)      -  Amikacin: S <=16      -  Amoxicillin/Clavulanic Acid: S <=8/4      -  Ampicillin: S <=8 These ampicillin results predict results for amoxicillin      -  Ampicillin/Sulbactam: S <=4/2 Enterobacter, Citrobacter, and Serratia may develop resistance during prolonged therapy (3-4 days)      -  Aztreonam: S <=4      -  Cefazolin: S <=2 (MIC_CL_COM_ENTERIC_CEFAZU) For uncomplicated UTI with K. pneumoniae, E. coli, or P. mirablis: JOSE <=16 is sensitive and JOSE >=32 is resistant. This also predicts results for oral agents cefaclor, cefdinir, cefpodoxime, cefprozil, cefuroxime axetil, cephalexin and locarbef for uncomplicated UTI. Note that some isolates may be susceptible to these agents while testing resistant to cefazolin.      -  Cefepime: S <=2      -  Cefoxitin: S <=8      -  Ceftriaxone: S <=1 Enterobacter, Citrobacter, and Serratia may develop resistance during prolonged therapy      -  Ciprofloxacin: S <=0.25      -  Ertapenem: S <=0.5      -  Gentamicin: S <=2      -  Imipenem: S <=1      -  Levofloxacin: S <=0.5      -  Meropenem: S <=1      -  Nitrofurantoin: S <=32 Should not be used to treat pyelonephritis      -  Piperacillin/Tazobactam: S <=8      -  Tigecycline: S <=2      -  Tobramycin: S <=2      -  Trimethoprim/Sulfamethoxazole: S <=0.5/9.5      Method Type: JOSE    Culture - Blood (collected 31 Jul 2021 10:16)  Source: .Blood Blood  Preliminary Report (01 Aug 2021 11:01):    No growth to date.    Culture - Blood (collected 31 Jul 2021 10:15)  Source: .Blood Blood  Preliminary Report (01 Aug 2021 11:01):    No growth to date.        < from: Xray Chest 2 Views PA/Lat (08.03.21 @ 11:16) >    IMPRESSION:  Clear lungs.    < end of copied text >

## 2021-08-03 NOTE — CONSULT NOTE ADULT - SUBJECTIVE AND OBJECTIVE BOX
CHIEF COMPLAINT:    HISTORY OF PRESENT ILLNESS:      ALLERGIES:  quinidine (Other)  sulfa drugs (Other)    HOME MEDICATIONS:  Eliquis 5mg BID  Metoprolol Succinate 100mg daily  Diltiazem 180mg daily  Lipitor 40mg qhs    MEDICATIONS:  apixaban 5 milliGRAM(s) Oral every 12 hours  metoprolol succinate ER 50 milliGRAM(s) Oral daily  piperacillin/tazobactam IVPB.. 3.375 Gram(s) IV Intermittent every 8 hours  HYDROmorphone  Injectable 0.25 milliGRAM(s) IV Push every 10 minutes PRN  pantoprazole    Tablet 40 milliGRAM(s) Oral before breakfast  chlorhexidine 4% Liquid 1 Application(s) Topical <User Schedule>      PAST MEDICAL & SURGICAL HISTORY:  Paroxysmal atrial fibrillation  Prostate cancer s/p prostatectomy  HLD    FAMILY HISTORY:  Father - MI       SOCIAL HISTORY:    [X]Non-smoker  [ ] Alcohol - denies      REVIEW OF SYSTEMS:  See HPI. Otherwise, 10 point ROS done and otherwise negative.    PHYSICAL EXAM:  T(C): 36.9 (08-03-21 @ 15:04), Max: 37.9 (08-03-21 @ 05:05)  HR: 84 (08-03-21 @ 15:04) (75 - 88)  BP: 151/78 (08-03-21 @ 15:04) (144/84 - 158/82)  RR: 18 (08-03-21 @ 15:04) (18 - 18)  SpO2: 95% (08-03-21 @ 15:04) (95% - 98%)  Wt(kg): --  I&O's Summary    02 Aug 2021 07:01  -  03 Aug 2021 07:00  --------------------------------------------------------  IN: 900 mL / OUT: 1550 mL / NET: -650 mL      Appearance: Alert. NAD	  Cardiovascular: +S1S2 RRR no m/g/r  Respiratory: CTA B/L	  Psychiatry: A & O x 3, Mood & affect appropriate  Gastrointestinal:  Soft, NT. mild distension, + BS	  Extremities: No edema BLE  Vascular: Peripheral pulses palpable 2+ bilaterally      LABS:	 	    CBC Full  -  ( 03 Aug 2021 07:37 )  WBC Count : 12.18 K/uL  Hemoglobin : 13.6 g/dL  Hematocrit : 40.9 %  Platelet Count - Automated : 179 K/uL  Mean Cell Volume : 94.5 fl  Mean Cell Hemoglobin : 31.4 pg  Mean Cell Hemoglobin Concentration : 33.3 gm/dL  Auto Neutrophil # : x  Auto Lymphocyte # : x  Auto Monocyte # : x  Auto Eosinophil # : x  Auto Basophil # : x  Auto Neutrophil % : x  Auto Lymphocyte % : x  Auto Monocyte % : x  Auto Eosinophil % : x  Auto Basophil % : x    08-03    135  |  105  |  24<H>  ----------------------------<  97  3.8   |  20<L>  |  1.32<H>  08-02    131<L>  |  99  |  28<H>  ----------------------------<  304<H>  3.7   |  17<L>  |  1.34<H>    Ca    9.8      03 Aug 2021 07:37  Ca    9.1      02 Aug 2021 04:28  Phos  2.2     08-03  Phos  1.8     08-02  Mg     2.2     08-03  Mg     2.0     08-02    TPro  6.0  /  Alb  2.6<L>  /  TBili  0.6  /  DBili  x   /  AST  28  /  ALT  19  /  AlkPhos  63  08-02      TELEMETRY:   	    ECG:    	  RADIOLOGY:   	    PREVIOUS DIAGNOSTIC TESTING:    Echocardiogram:    Catheterization:    Stress Test:  	   CHIEF COMPLAINT: AFib / Urosepsis    HISTORY OF PRESENT ILLNESS: 75 y/o male with PMHx of HLD, prostate CA, s/p prostatectomy, AFib on eliquis s/p multiple DCCV, most recent DCCV 7/30 presents to ED with L flank pain. Found to have stone, now s/p stent and c/b septic shock. Transferred to MICU requiring pressor support with Anish and Midodrine. Weaned off pressors and midodrine and is currently normotensive and NSR. EP following for medication recommendations.       ALLERGIES:  quinidine (Other)  sulfa drugs (Other)    HOME MEDICATIONS:  Eliquis 5mg BID  Metoprolol Succinate 100mg daily  Diltiazem 180mg daily  Lipitor 40mg qhs    MEDICATIONS:  apixaban 5 milliGRAM(s) Oral every 12 hours  metoprolol succinate ER 50 milliGRAM(s) Oral daily  piperacillin/tazobactam IVPB.. 3.375 Gram(s) IV Intermittent every 8 hours  HYDROmorphone  Injectable 0.25 milliGRAM(s) IV Push every 10 minutes PRN  pantoprazole    Tablet 40 milliGRAM(s) Oral before breakfast  chlorhexidine 4% Liquid 1 Application(s) Topical <User Schedule>      PAST MEDICAL & SURGICAL HISTORY:  Paroxysmal atrial fibrillation  Prostate cancer s/p prostatectomy  HLD    FAMILY HISTORY:  Father - MI       SOCIAL HISTORY:    [X]Non-smoker  [ ] Alcohol - denies      REVIEW OF SYSTEMS:  See HPI. Otherwise, 10 point ROS done and otherwise negative.    PHYSICAL EXAM:  T(C): 36.9 (08-03-21 @ 15:04), Max: 37.9 (08-03-21 @ 05:05)  HR: 84 (08-03-21 @ 15:04) (75 - 88)  BP: 151/78 (08-03-21 @ 15:04) (144/84 - 158/82)  RR: 18 (08-03-21 @ 15:04) (18 - 18)  SpO2: 95% (08-03-21 @ 15:04) (95% - 98%)  Wt(kg): --  I&O's Summary    02 Aug 2021 07:01  -  03 Aug 2021 07:00  --------------------------------------------------------  IN: 900 mL / OUT: 1550 mL / NET: -650 mL      Appearance: Alert. NAD	  Cardiovascular: +S1S2 RRR no m/g/r  Respiratory: CTA B/L	  Psychiatry: A & O x 3, Mood & affect appropriate  Gastrointestinal:  Soft, NT. mild distension, + BS	  Extremities: No edema BLE  Vascular: Peripheral pulses palpable 2+ bilaterally      LABS:	 	    CBC Full  -  ( 03 Aug 2021 07:37 )  WBC Count : 12.18 K/uL  Hemoglobin : 13.6 g/dL  Hematocrit : 40.9 %  Platelet Count - Automated : 179 K/uL  Mean Cell Volume : 94.5 fl  Mean Cell Hemoglobin : 31.4 pg  Mean Cell Hemoglobin Concentration : 33.3 gm/dL  Auto Neutrophil # : x  Auto Lymphocyte # : x  Auto Monocyte # : x  Auto Eosinophil # : x  Auto Basophil # : x  Auto Neutrophil % : x  Auto Lymphocyte % : x  Auto Monocyte % : x  Auto Eosinophil % : x  Auto Basophil % : x    08-03    135  |  105  |  24<H>  ----------------------------<  97  3.8   |  20<L>  |  1.32<H>  08-02    131<L>  |  99  |  28<H>  ----------------------------<  304<H>  3.7   |  17<L>  |  1.34<H>    Ca    9.8      03 Aug 2021 07:37  Ca    9.1      02 Aug 2021 04:28  Phos  2.2     08-03  Phos  1.8     08-02  Mg     2.2     08-03  Mg     2.0     08-02    TPro  6.0  /  Alb  2.6<L>  /  TBili  0.6  /  DBili  x   /  AST  28  /  ALT  19  /  AlkPhos  63  08-02    TELEMETRY: n/a  	    ECG: NSR 80-100s, personally reviewed   	  ECHO:  Patient name: BAKARI LEE  YOB: 1945   Age: 76 (M)   MR#: [id number]  Study Date: 8/3/2021  Location: [address] [place], [place] [id number]: Nupur Phillips RDCS  Study quality: Technically fair  Referring Physician: Dylan Billingsley MD  Blood Pressure: 158/82 mmHg  Height: 180 cm  Weight: 81 kg  BSA: 2 m2  Heart Rhythm: Normal sinus  Heart Rate: 76 mmHg  ------------------------------------------------------------------------  PROCEDURE: Transthoracic echocardiogram with 2-D, M-Mode  andcomplete spectral and color flow Doppler.  INDICATION: Unspecified atrial fibrillation (I48.91)  ------------------------------------------------------------------------  Dimensions:    Normal Values:  LA:     4.1    2.0 - 4.0 cm  Ao:     3.3    2.0 - 3.8 cm  SEPTUM: 0.7    0.6 - 1.2 cm  PWT:    0.9    0.6 - 1.1 cm  LVIDd:  5.2    3.0 - 5.6 cm  LVIDs:  3.4    1.8 - 4.0 cm  Derived variables:  LVMI: 72 g/m2  RWT: 0.34  EF (Visual Estimate): 65 %  Doppler Peak Velocity (m/sec): AoV=1.1  ------------------------------------------------------------------------  Observations:  Mitral Valve: Mitral annular calcification. Minimal mitral  regurgitation.  Aortic Valve/Aorta: Calcified aortic valve with normal  opening.  Normal aortic root size.  Left Atrium: Normal left atrium. Interatrial septal  aneurysm.  Left Ventricle: Normal left ventricular internal dimensions  and wall thicknesses.  Normal left ventricular systolic function. No segmental  wall motion abnormalities.  Impaired LV-relaxation with normal filling pressure.  Right Heart: Normal right atrium. Normal right ventricular  size and function.  Normal tricuspid valve.  Normal pulmonic valve. Mild pulmonic regurgitation.  Pericardium/Pleura: Normal pericardium with no pericardial  effusion.  Hemodynamic: Estimated right atrial pressure is normal.  No evidence of pulmonary hypertension.  No PFO seen with color Doppler.  ------------------------------------------------------------------------  Conclusions:  The rhythm during this study is sinus.  Normal left ventricular systolic function. No segmental  wall motion abnormalities.  ------------------------------------------------------------------------  Confirmed on  8/3/2021 - 14:56:39 by Daniel Sotelo MD, FASE  ------------------------------------------------------------------------      RADIOLOGY:  EXAM:  XR CHEST PA LAT 2V                          PROCEDURE DATE:  08/03/2021      INTERPRETATION:  EXAMINATION: XR CHEST PA AND LATERAL    CLINICAL INDICATION: Leukocytosis of unknown origin, concern for pneumonia    TECHNIQUE: 2 views; Frontal and lateral views of the chest were obtained.    COMPARISON: Chest x-ray, 7/31/2021.    FINDINGS:    The heart is normal in size.  The lungs are clear.  There is no pneumothorax or pleural effusion.    IMPRESSION:  Clear lungs.    --- End of Report ---    CORRIE ERAZO MD; Resident Radiologist  This document has been electronically signed.  MELI BRASWELL MD; Attending Radiologist  This document has been electronically signed. Aug  3 2021 11:45AM

## 2021-08-03 NOTE — CONSULT NOTE ADULT - ASSESSMENT
75 y/o male with PMHx of HLD, prostate CA, s/p prostatectomy, AFib on eliquis s/p multiple DCCV, most recent DCCV 7/30 presents to ED with L flank pain. Found to have stone, now s/p stent and c/b septic shock. Transferred to MICU requiring pressor support with Anish and Midodrine. Weaned off pressors and midodrine and is currently normotensive and NSR. EP following for medication recommendations.     1. AFib     - Pt now normotensive no longer requiring midodrine.   - Continue Eliquis for AC, must have uninterrupted AC x 1 month due to recent DCCV.  - Pt currently NSR 80-100s on recent EKG, resume home beta blocker for rate control. Uptitrate as needed  - Continue ABX per ID/Urology recommendations  - Keep K>4.0 and Mg>2.0, replete as necessary.  - Discussed with EP attending. EP to sign off, reconsult as needed.       ABI BrayC  [id number]

## 2021-08-03 NOTE — PROGRESS NOTE ADULT - ASSESSMENT
77 yo man PMHx of HTN & paroxysmal Afib s/p recent DCCV who was admitted for urosepsis - now resolved. Cardiology was consulted for management recommendations for paroxysmal Afib    #Paroxysmal Afib w/ prior DCCV  - Tele: NSR  - Off midodrine, BP normotensive so okay to resume metoprolol succinate 50mg daily  - C/w eliquis 5 BID for systemic embolization prevention give [id number] >1  - Obtain TTE    Discussed w/ Dr. Juliet Sabillon MD  PGY5 Cardiology   BAKARI LEE is a 75 yo man PMHx of HTN & paroxysmal Afib s/p recent DCCV who was admitted for urosepsis - now resolved. Cardiology was consulted for management recommendations for paroxysmal Afib   his BP is normotensive will resume bb and continue AC  fu TTE    #Paroxysmal Afib w/ prior DCCV  - Tele: NSR  - Off midodrine, BP normotensive so okay to resume metoprolol succinate 50mg daily  - C/w eliquis 5 BID for systemic embolization prevention give [id number] >1  - Obtain TTE    Discussed w/ Dr. Juliet Sabillon MD  PGY5 Cardiology    Patient seen and examined with the fellow, the note above has been edited to reflect my independent history, physical exam, assessment and plan.      Edison Chung MD, PhD  Cardiology Attending  [facility]    For day time coverage Mon-Fri see Non-Service Consult Attending on [email or web address], password: [email or web address]; daytime weekends covered by general cardiology consult service attending.)

## 2021-08-03 NOTE — PROGRESS NOTE ADULT - ASSESSMENT
75 y/o M with PMH of afib on eliquis (had cardioversion yesterday), prostate ca s/p resection presenting with L flank pain that started in the evening. Patient states pain radiates to his groin. Has associated n/v. States pain is similar to stones in past. No fevers, chills, cp, sob, LE swelling   CT with obstructing 8 mm L stone  s/p  L ureteral stent.  Hypotensive post procedure SIRS/sepsis  blood Cx negative  Urine cx as above  pan sensitive  clinically stable  Leucocytosis decreased  Low grade temps-unclear etiology     Rec:    A) fever  check repeat blood Cx, urine Cx, COVID PCR  Continue zosyn  If fevers persist or increase/worsening  CT abd pelvis    B) UTI   Pyelo   Nephrolithiasis s/p stent  E coli pan sensitive  Pt on BS abx  clinically better but febrile  plan as above       C) Nephrolithiasis  s/p stent  further plan per urology    Will tailor plan for ID issues  per course,results.Will defer to primary team on management of other issues.  Assessment, plan and recommendations as detailed above were discussed with the primary    team.  Will Follow.  Beeper [phone] [facility] [id number].   Wknd/afterhours/No response-[phone] or Fellow on call

## 2021-08-04 ENCOUNTER — TRANSCRIPTION ENCOUNTER (OUTPATIENT)
Age: 76
End: 2021-08-04

## 2021-08-04 VITALS
DIASTOLIC BLOOD PRESSURE: 81 MMHG | OXYGEN SATURATION: 98 % | TEMPERATURE: 97 F | HEART RATE: 83 BPM | SYSTOLIC BLOOD PRESSURE: 139 MMHG | RESPIRATION RATE: 18 BRPM

## 2021-08-04 LAB
ANION GAP SERPL CALC-SCNC: 10 MMOL/L — SIGNIFICANT CHANGE UP (ref 5–17)
BASOPHILS # BLD AUTO: 0.07 K/UL — SIGNIFICANT CHANGE UP (ref 0–0.2)
BASOPHILS NFR BLD AUTO: 0.7 % — SIGNIFICANT CHANGE UP (ref 0–2)
BUN SERPL-MCNC: 17 MG/DL — SIGNIFICANT CHANGE UP (ref 7–23)
CALCIUM SERPL-MCNC: 10.2 MG/DL — SIGNIFICANT CHANGE UP (ref 8.4–10.5)
CHLORIDE SERPL-SCNC: 107 MMOL/L — SIGNIFICANT CHANGE UP (ref 96–108)
CO2 SERPL-SCNC: 21 MMOL/L — LOW (ref 22–31)
CREAT SERPL-MCNC: 1.27 MG/DL — SIGNIFICANT CHANGE UP (ref 0.5–1.3)
EOSINOPHIL # BLD AUTO: 0.19 K/UL — SIGNIFICANT CHANGE UP (ref 0–0.5)
EOSINOPHIL NFR BLD AUTO: 2 % — SIGNIFICANT CHANGE UP (ref 0–6)
GLUCOSE SERPL-MCNC: 111 MG/DL — HIGH (ref 70–99)
HCT VFR BLD CALC: 41.5 % — SIGNIFICANT CHANGE UP (ref 39–50)
HGB BLD-MCNC: 13.6 G/DL — SIGNIFICANT CHANGE UP (ref 13–17)
IMM GRANULOCYTES NFR BLD AUTO: 0.8 % — SIGNIFICANT CHANGE UP (ref 0–1.5)
LYMPHOCYTES # BLD AUTO: 0.85 K/UL — LOW (ref 1–3.3)
LYMPHOCYTES # BLD AUTO: 8.8 % — LOW (ref 13–44)
MAGNESIUM SERPL-MCNC: 2.1 MG/DL — SIGNIFICANT CHANGE UP (ref 1.6–2.6)
MCHC RBC-ENTMCNC: 30.8 PG — SIGNIFICANT CHANGE UP (ref 27–34)
MCHC RBC-ENTMCNC: 32.8 GM/DL — SIGNIFICANT CHANGE UP (ref 32–36)
MCV RBC AUTO: 94.1 FL — SIGNIFICANT CHANGE UP (ref 80–100)
MONOCYTES # BLD AUTO: 1.11 K/UL — HIGH (ref 0–0.9)
MONOCYTES NFR BLD AUTO: 11.5 % — SIGNIFICANT CHANGE UP (ref 2–14)
NEUTROPHILS # BLD AUTO: 7.32 K/UL — SIGNIFICANT CHANGE UP (ref 1.8–7.4)
NEUTROPHILS NFR BLD AUTO: 76.2 % — SIGNIFICANT CHANGE UP (ref 43–77)
NRBC # BLD: 0 /100 WBCS — SIGNIFICANT CHANGE UP (ref 0–0)
PHOSPHATE SERPL-MCNC: 2.1 MG/DL — LOW (ref 2.5–4.5)
PLATELET # BLD AUTO: 197 K/UL — SIGNIFICANT CHANGE UP (ref 150–400)
POTASSIUM SERPL-MCNC: 4.1 MMOL/L — SIGNIFICANT CHANGE UP (ref 3.5–5.3)
POTASSIUM SERPL-SCNC: 4.1 MMOL/L — SIGNIFICANT CHANGE UP (ref 3.5–5.3)
RBC # BLD: 4.41 M/UL — SIGNIFICANT CHANGE UP (ref 4.2–5.8)
RBC # FLD: 14.1 % — SIGNIFICANT CHANGE UP (ref 10.3–14.5)
SODIUM SERPL-SCNC: 138 MMOL/L — SIGNIFICANT CHANGE UP (ref 135–145)
WBC # BLD: 9.62 K/UL — SIGNIFICANT CHANGE UP (ref 3.8–10.5)
WBC # FLD AUTO: 9.62 K/UL — SIGNIFICANT CHANGE UP (ref 3.8–10.5)

## 2021-08-04 PROCEDURE — 76000 FLUOROSCOPY <1 HR PHYS/QHP: CPT

## 2021-08-04 PROCEDURE — 96374 THER/PROPH/DIAG INJ IV PUSH: CPT

## 2021-08-04 PROCEDURE — 82803 BLOOD GASES ANY COMBINATION: CPT

## 2021-08-04 PROCEDURE — 87186 SC STD MICRODIL/AGAR DIL: CPT

## 2021-08-04 PROCEDURE — 93306 TTE W/DOPPLER COMPLETE: CPT

## 2021-08-04 PROCEDURE — C2617: CPT

## 2021-08-04 PROCEDURE — 82947 ASSAY GLUCOSE BLOOD QUANT: CPT

## 2021-08-04 PROCEDURE — 86850 RBC ANTIBODY SCREEN: CPT

## 2021-08-04 PROCEDURE — 82330 ASSAY OF CALCIUM: CPT

## 2021-08-04 PROCEDURE — 86901 BLOOD TYPING SEROLOGIC RH(D): CPT

## 2021-08-04 PROCEDURE — 74177 CT ABD & PELVIS W/CONTRAST: CPT | Mod: MA

## 2021-08-04 PROCEDURE — 85610 PROTHROMBIN TIME: CPT

## 2021-08-04 PROCEDURE — 87086 URINE CULTURE/COLONY COUNT: CPT

## 2021-08-04 PROCEDURE — 96375 TX/PRO/DX INJ NEW DRUG ADDON: CPT

## 2021-08-04 PROCEDURE — 85014 HEMATOCRIT: CPT

## 2021-08-04 PROCEDURE — 71046 X-RAY EXAM CHEST 2 VIEWS: CPT

## 2021-08-04 PROCEDURE — U0003: CPT

## 2021-08-04 PROCEDURE — 93005 ELECTROCARDIOGRAM TRACING: CPT

## 2021-08-04 PROCEDURE — 87040 BLOOD CULTURE FOR BACTERIA: CPT

## 2021-08-04 PROCEDURE — C1758: CPT

## 2021-08-04 PROCEDURE — 84295 ASSAY OF SERUM SODIUM: CPT

## 2021-08-04 PROCEDURE — 0225U NFCT DS DNA&RNA 21 SARSCOV2: CPT

## 2021-08-04 PROCEDURE — C1769: CPT

## 2021-08-04 PROCEDURE — 99285 EMERGENCY DEPT VISIT HI MDM: CPT | Mod: 25

## 2021-08-04 PROCEDURE — 71045 X-RAY EXAM CHEST 1 VIEW: CPT

## 2021-08-04 PROCEDURE — 82435 ASSAY OF BLOOD CHLORIDE: CPT

## 2021-08-04 PROCEDURE — 85018 HEMOGLOBIN: CPT

## 2021-08-04 PROCEDURE — 99232 SBSQ HOSP IP/OBS MODERATE 35: CPT

## 2021-08-04 PROCEDURE — U0005: CPT

## 2021-08-04 PROCEDURE — 81001 URINALYSIS AUTO W/SCOPE: CPT

## 2021-08-04 PROCEDURE — 85025 COMPLETE CBC W/AUTO DIFF WBC: CPT

## 2021-08-04 PROCEDURE — 83605 ASSAY OF LACTIC ACID: CPT

## 2021-08-04 PROCEDURE — 85730 THROMBOPLASTIN TIME PARTIAL: CPT

## 2021-08-04 PROCEDURE — 86900 BLOOD TYPING SEROLOGIC ABO: CPT

## 2021-08-04 PROCEDURE — 83735 ASSAY OF MAGNESIUM: CPT

## 2021-08-04 PROCEDURE — 97161 PT EVAL LOW COMPLEX 20 MIN: CPT

## 2021-08-04 PROCEDURE — 80048 BASIC METABOLIC PNL TOTAL CA: CPT

## 2021-08-04 PROCEDURE — 84132 ASSAY OF SERUM POTASSIUM: CPT

## 2021-08-04 PROCEDURE — 84100 ASSAY OF PHOSPHORUS: CPT

## 2021-08-04 PROCEDURE — 80053 COMPREHEN METABOLIC PANEL: CPT

## 2021-08-04 PROCEDURE — 85027 COMPLETE CBC AUTOMATED: CPT

## 2021-08-04 RX ORDER — PANTOPRAZOLE SODIUM 20 MG/1
1 TABLET, DELAYED RELEASE ORAL
Qty: 30 | Refills: 0
Start: 2021-08-04 | End: 2021-09-02

## 2021-08-04 RX ORDER — METOPROLOL TARTRATE 50 MG
1 TABLET ORAL
Qty: 30 | Refills: 0
Start: 2021-08-04 | End: 2021-09-02

## 2021-08-04 RX ORDER — DILTIAZEM HCL 120 MG
1 CAPSULE, EXT RELEASE 24 HR ORAL
Qty: 0 | Refills: 0 | DISCHARGE

## 2021-08-04 RX ORDER — POTASSIUM PHOSPHATE, MONOBASIC POTASSIUM PHOSPHATE, DIBASIC 236; 224 MG/ML; MG/ML
30 INJECTION, SOLUTION INTRAVENOUS ONCE
Refills: 0 | Status: COMPLETED | OUTPATIENT
Start: 2021-08-04 | End: 2021-08-04

## 2021-08-04 RX ORDER — CEFUROXIME AXETIL 250 MG
1 TABLET ORAL
Qty: 20 | Refills: 0
Start: 2021-08-04 | End: 2021-08-13

## 2021-08-04 RX ORDER — CEFUROXIME AXETIL 250 MG
500 TABLET ORAL EVERY 12 HOURS
Refills: 0 | Status: DISCONTINUED | OUTPATIENT
Start: 2021-08-04 | End: 2021-08-04

## 2021-08-04 RX ORDER — METOPROLOL TARTRATE 50 MG
1 TABLET ORAL
Qty: 0 | Refills: 0 | DISCHARGE

## 2021-08-04 RX ADMIN — Medication 50 MILLIGRAM(S): at 06:08

## 2021-08-04 RX ADMIN — PIPERACILLIN AND TAZOBACTAM 25 GRAM(S): 4; .5 INJECTION, POWDER, LYOPHILIZED, FOR SOLUTION INTRAVENOUS at 02:30

## 2021-08-04 RX ADMIN — POTASSIUM PHOSPHATE, MONOBASIC POTASSIUM PHOSPHATE, DIBASIC 83.33 MILLIMOLE(S): 236; 224 INJECTION, SOLUTION INTRAVENOUS at 13:03

## 2021-08-04 RX ADMIN — APIXABAN 5 MILLIGRAM(S): 2.5 TABLET, FILM COATED ORAL at 06:08

## 2021-08-04 RX ADMIN — PANTOPRAZOLE SODIUM 40 MILLIGRAM(S): 20 TABLET, DELAYED RELEASE ORAL at 06:08

## 2021-08-04 NOTE — DISCHARGE NOTE PROVIDER - HOSPITAL COURSE
77 y/o M with PMH of afib on eliquis (had cardioversion the day prior to admission), prostate ca s/p resection presenting with L flank pain. Patient stated pain radiates to his groin. Had associated n/v. States pain is similar to stones in past. CT with obstructing 8 mm L stone s/p  L ureteral stent.  Hypotensive post procedure meeting SIRS/sepsis requiring admission to MICU for management of septic shock requiring pressors.  Blood Cx negative but +Ucx (Ecoli).  Pt was on emperic Zosyn with noted improvement in Leucocytosis.  Low grade temps-resolved.  Transitioned to PO Ceftin 500mg PO BID x 10 days.      Hypotension 2/2 septic shock resolved  - titrating off phenylephrine  - midodrine Taper.    Ureteral stone and UTI  - postop care  - antibiotic  - Urology follow  - ID follow  - pain control    A Fib  - s/p cardioversion  - continue Rx  - cardiology evaluation noted.   - Echo

## 2021-08-04 NOTE — PROGRESS NOTE ADULT - NSICDXPILOT_GEN_ALL_CORE
[place]

## 2021-08-04 NOTE — PROGRESS NOTE ADULT - PROVIDER SPECIALTY LIST ADULT
Infectious Disease
Urology
Infectious Disease
Internal Medicine
MICU
Urology
Infectious Disease
Internal Medicine
MICU
Cardiology
Infectious Disease
Urology

## 2021-08-04 NOTE — DISCHARGE NOTE PROVIDER - NSDCMRMEDTOKEN_GEN_ALL_CORE_FT
cefuroxime 500 mg oral tablet: 1 tab(s) orally every 12 hours  Eliquis 5 mg oral tablet: 1 tab(s) orally 2 times a day  Lipitor 40 mg oral tablet: 1 tab(s) orally once a day  metoprolol succinate 50 mg oral tablet, extended release: 1 tab(s) orally once a day  pantoprazole 40 mg oral delayed release tablet: 1 tab(s) orally once a day (before a meal)

## 2021-08-04 NOTE — DISCHARGE NOTE PROVIDER - CARE PROVIDER_API CALL
Brandon Dan)  Infectious Disease; Internal Medicine  [address] [facility] [address] [place], [place] [address]  Phone: [phone]  Fax: [phone]  Follow Up Time:     Goldberg, Gary D D  UROLOGY  [address], [address]  [place] [address]  Phone: [phone]  Fax: [phone]  Follow Up Time:     Andrew Archer)  Family Medicine; Geriatric Medicine  [address] [place] [address]  Phone: [phone]  Fax: [phone]  Follow Up Time:

## 2021-08-04 NOTE — DISCHARGE NOTE PROVIDER - CARE PROVIDERS DIRECT ADDRESSES
[email or web address]

## 2021-08-04 NOTE — DISCHARGE NOTE PROVIDER - NSDCFUSCHEDAPPT_GEN_ALL_CORE_FT
BAKARI LEE ; 08/18/2021 ; NPP OrthoSurg [address] [facility]  BAKARI LEE ; 09/28/2021 ; NPP Cardio Electro [address]

## 2021-08-04 NOTE — PROGRESS NOTE ADULT - ASSESSMENT
77 y/o M with PMH of afib on eliquis (had cardioversion yesterday), prostate ca s/p resection presenting with L flank pain that started in the evening. Patient states pain radiates to his groin. Has associated n/v. States pain is similar to stones in past. No fevers, chills, cp, sob, LE swelling   CT with obstructing 8 mm L stone  s/p  L ureteral stent.  Hypotensive post procedure SIRS/sepsis  blood Cx negative  Urine cx as above  pan sensitive  clinically stable  Leucocytosis decreased  Low grade temps-resolved  dfeels well  wants to go home  repeat CXR clear-blood Cx pending     Rec:    A) fever  reoslved  follow blood Cx  if stable could de-escalate to oral ceftin  10 day course yanet;east-or till stone extraction  IF Dced home advised about s/s worsening and to come back to ER if occur    B) UTI   Pyelo   Nephrolithiasis s/p stent  E coli pan sensitive  s/p 6 days of IV abx   clinically better  plan as above       C) Nephrolithiasis  s/p stent  further plan per urology    Will tailor plan for ID issues  per course,results.Will defer to primary team on management of other issues.  Assessment, plan and recommendations as detailed above were discussed with the primary    team.  If dced to follow with urology, can also follow in ID clinic in 2-3 weeks   Will Follow.  Beeper [phone] [facility] [id number].   Wknd/afterhours/No response-[phone] or Fellow on call

## 2021-08-04 NOTE — PHYSICAL THERAPY INITIAL EVALUATION ADULT - PERTINENT HX OF CURRENT PROBLEM, REHAB EVAL
Pt is a 77 y/o M with PMH of afib on eliquis (dx in his 20s, gets frequent cardioversions, was at hospital for cardioversion the day before admission), prostate ca s/p resection presenting with L flank pain that started the evening before admission, s/p L ureteral stent placement, found to have fever 103F, and hypotension, with U/A concerning for URI, being transferred to the MICU for management of septic shock, s/p dc ggt. (-) CXR 8/3/21. (-) TTE 8/3/21. Continued below.

## 2021-08-04 NOTE — PHYSICAL THERAPY INITIAL EVALUATION ADULT - DISCHARGE DISPOSITION, PT EVAL
Patient is cleared for D/C home from physical therapy standpoint. Patient is agreeable, JAMAR Lance and  Peyton Agarwal aware./no skilled PT needs

## 2021-08-04 NOTE — DISCHARGE NOTE PROVIDER - NSDCCPCAREPLAN_GEN_ALL_CORE_FT
PRINCIPAL DISCHARGE DIAGNOSIS  Diagnosis: Septic shock  Assessment and Plan of Treatment: Resolved.  Continue with antibiotics as prescribed.  Follow up with your PMD within one week of discharge.  Follow up with Infectious Disease in 3-4 weeks.  Please call to schedule your appointment.      SECONDARY DISCHARGE DIAGNOSES  Diagnosis: Paroxysmal atrial fibrillation  Assessment and Plan of Treatment: Continue to take your Eliquis as prescribed.    Diagnosis: Ureteral stone  Assessment and Plan of Treatment: Follow up with your urologist as outpatient for stone management.  Please call to schedule your appointment.    Diagnosis: E-coli UTI  Assessment and Plan of Treatment: Continue to take your antibiotics as prescribed.  Follow up with Dr. Dan from infectious disease in 3-4 weeks.  Please call to schedule your appointment.    Diagnosis: Hypertension  Assessment and Plan of Treatment: Please note that your metoprolol dose has been decreased and your cardizem has been held during your admission.   Please follow up with your PMD within one week of discharge for assessment and possible resumption/increase in your medications.

## 2021-08-04 NOTE — PROGRESS NOTE ADULT - ASSESSMENT
76 m with    Hypotension 2/2 septic shock resolved  - midodrine Taper.    Ureteral stone and UTI  - postop care  - antibiotic  - Urology follow  - ID follow noted  - pain control    A Fib  - s/p cardioversion  - continue Rx  - cardiology follow noted.     Prostate Ca  - stable    DVT prophylaxis  - Eliquis    DC home. Follow with PMD/ Cardiology and Urology in 3-4 days. QA    Dylan Billingsley MD pager [phone]

## 2021-08-04 NOTE — PROGRESS NOTE ADULT - SUBJECTIVE AND OBJECTIVE BOX
Patient is a 76y old  Male who presents with a chief complaint of Left flank pain (04 Aug 2021 15:25)      SUBJECTIVE / OVERNIGHT EVENTS: Comfortable without new complaints. Wife at bedside.  Review of Systems  chest pain no  palpitations no  sob no  nausea no  headache no    MEDICATIONS  (STANDING):  apixaban 5 milliGRAM(s) Oral every 12 hours  cefuroxime   Tablet 500 milliGRAM(s) Oral every 12 hours  chlorhexidine 4% Liquid 1 Application(s) Topical <User Schedule>  metoprolol succinate ER 50 milliGRAM(s) Oral daily  pantoprazole    Tablet 40 milliGRAM(s) Oral before breakfast    MEDICATIONS  (PRN):  HYDROmorphone  Injectable 0.25 milliGRAM(s) IV Push every 10 minutes PRN Moderate Pain (4 - 6)      Vital Signs Last 24 Hrs  T(C): 36.3 (04 Aug 2021 13:29), Max: 37.5 (03 Aug 2021 17:33)  T(F): 97.3 (04 Aug 2021 13:29), Max: 99.5 (03 Aug 2021 17:33)  HR: 83 (04 Aug 2021 13:29) (79 - 87)  BP: 139/81 (04 Aug 2021 13:29) (127/76 - 149/72)  BP(mean): --  RR: 18 (04 Aug 2021 13:29) (18 - 18)  SpO2: 98% (04 Aug 2021 13:29) (96% - 98%)    PHYSICAL EXAM:  GENERAL: NAD, well-developed  HEAD:  Atraumatic, Normocephalic  EYES: EOMI, PERRLA, conjunctiva and sclera clear  NECK: Supple, No JVD  CHEST/LUNG: Clear to auscultation bilaterally; No wheeze  HEART: Regular rate and rhythm; No murmurs, rubs, or gallops  ABDOMEN: Soft, Nontender, Nondistended; Bowel sounds present  EXTREMITIES:  2+ Peripheral Pulses, No clubbing, cyanosis, or edema  PSYCH: AAOx3  NEUROLOGY: non-focal  SKIN: No rashes or lesions    LABS:                        13.6   9.62  )-----------( 197      ( 04 Aug 2021 07:21 )             41.5     08-04    138  |  107  |  17  ----------------------------<  111<H>  4.1   |  21<L>  |  1.27    Ca    10.2      04 Aug 2021 07:13  Phos  2.1     08-  Mg     2.1     08-04            Urinalysis Basic - ( 03 Aug 2021 10:26 )    Color: Light Orange / Appearance: Slightly Turbid / S.014 / pH: x  Gluc: x / Ketone: Negative  / Bili: Negative / Urobili: Negative   Blood: x / Protein: 100 / Nitrite: Negative   Leuk Esterase: Moderate / RBC: 1724 /hpf / WBC 21 /HPF   Sq Epi: x / Non Sq Epi: 1 /hpf / Bacteria: Negative        Culture - Blood (collected 03 Aug 2021 14:51)  Source: .Blood Blood-Peripheral  Preliminary Report (04 Aug 2021 15:05):    No growth to date.    Culture - Blood (collected 03 Aug 2021 13:35)  Source: .Blood Blood-Peripheral  Preliminary Report (04 Aug 2021 14:01):    No growth to date.    < from: Transthoracic Echocardiogram (21 @ 11:26) >  Conclusions:  The rhythm during this study is sinus.  Normal left ventricular systolic function. No segmental  wall motion abnormalities.    < end of copied text >      RADIOLOGY & ADDITIONAL TESTS:    Imaging Personally Reviewed:    Consultant(s) Notes Reviewed:      Care Discussed with Consultants/Other Providers:

## 2021-08-04 NOTE — PROGRESS NOTE ADULT - SUBJECTIVE AND OBJECTIVE BOX
Patient is a 76y old  Male who presents with a chief complaint of Renal colic (02 Aug 2021 17:31)    Being followed by ID for UTI    Interval history:feels well  no urinary complaints  no flank pain   No acute events      ROS:  No cough,SOB,CP  No N/V/D./abd pain  No other complaints      Antimicrobials:    cefuroxime   Tablet 500 milliGRAM(s) Oral every 12 hours    Other medications reviewed    Vital Signs Last 24 Hrs  T(C): 36.6 (08-04-21 @ 09:47), Max: 37.5 (08-03-21 @ 17:33)  T(F): 97.9 (08-04-21 @ 09:47), Max: 99.5 (08-03-21 @ 17:33)  HR: 79 (08-04-21 @ 09:47) (79 - 87)  BP: 127/76 (08-04-21 @ 09:47) (127/76 - 151/78)  BP(mean): --  RR: 18 (08-04-21 @ 09:47) (18 - 18)  SpO2: 97% (08-04-21 @ 09:47) (95% - 97%)    Physical Exam:      HEENT PERRLA EOMI    No oral exudate or erythema    Chest Good AE,CTA    CVS RRR S1 S2 WNl No murmur or rub or gallop    Abd obes esoft BS normal No tenderness no masses    IV site no erythema tenderness or discharge    CNS AAO X 3 no focal  Lab Data:                          13.6   9.62  )-----------( 197      ( 04 Aug 2021 07:21 )             41.5       08-04    138  |  107  |  17  ----------------------------<  111<H>  4.1   |  21<L>  |  1.27    Ca    10.2      04 Aug 2021 07:13  Phos  2.1     08-04  Mg     2.1     08-04          Culture - Urine (collected 31 Jul 2021 16:38)  Source: Kidney Kidney  Final Report (02 Aug 2021 18:58):    Numerous Escherichia coli  Organism: Escherichia coli (02 Aug 2021 18:58)  Organism: Escherichia coli (02 Aug 2021 18:58)      -  Amikacin: S <=16      -  Amoxicillin/Clavulanic Acid: S <=8/4      -  Ampicillin: S <=8 These ampicillin results predict results for amoxicillin      -  Ampicillin/Sulbactam: S <=4/2 Enterobacter, Citrobacter, and Serratia may develop resistance during prolonged therapy (3-4 days)      -  Aztreonam: S <=4      -  Cefazolin: S <=2 (MIC_CL_COM_ENTERIC_CEFAZU) For uncomplicated UTI with K. pneumoniae, E. coli, or P. mirablis: JOSE <=16 is sensitive and JOSE >=32 is resistant. This also predicts results for oral agents cefaclor, cefdinir, cefpodoxime, cefprozil, cefuroxime axetil, cephalexin and locarbef for uncomplicated UTI. Note that some isolates may be susceptible to these agents while testing resistant to cefazolin.      -  Cefepime: S <=2      -  Cefoxitin: S <=8      -  Ceftriaxone: S <=1 Enterobacter, Citrobacter, and Serratia may develop resistance during prolonged therapy      -  Ciprofloxacin: S <=0.25      -  Ertapenem: S <=0.5      -  Gentamicin: S <=2      -  Imipenem: S <=1      -  Levofloxacin: S <=0.5      -  Meropenem: S <=1      -  Piperacillin/Tazobactam: S <=8      -  Tigecycline: S <=2      -  Tobramycin: S <=2      -  Trimethoprim/Sulfamethoxazole: S <=0.5/9.5      Method Type: JOSE    Culture - Urine (collected 31 Jul 2021 10:19)  Source: Clean Catch Clean Catch (Midstream)  Final Report (02 Aug 2021 19:48):    >100,000 CFU/ml Escherichia coli  Organism: Escherichia coli (02 Aug 2021 19:48)  Organism: Escherichia coli (02 Aug 2021 19:48)      -  Amikacin: S <=16      -  Amoxicillin/Clavulanic Acid: S <=8/4      -  Ampicillin: S <=8 These ampicillin results predict results for amoxicillin      -  Ampicillin/Sulbactam: S <=4/2 Enterobacter, Citrobacter, and Serratia may develop resistance during prolonged therapy (3-4 days)      -  Aztreonam: S <=4      -  Cefazolin: S <=2 (MIC_CL_COM_ENTERIC_CEFAZU) For uncomplicated UTI with K. pneumoniae, E. coli, or P. mirablis: JOSE <=16 is sensitive and JOSE >=32 is resistant. This also predicts results for oral agents cefaclor, cefdinir, cefpodoxime, cefprozil, cefuroxime axetil, cephalexin and locarbef for uncomplicated UTI. Note that some isolates may be susceptible to these agents while testing resistant to cefazolin.      -  Cefepime: S <=2      -  Cefoxitin: S <=8      -  Ceftriaxone: S <=1 Enterobacter, Citrobacter, and Serratia may develop resistance during prolonged therapy      -  Ciprofloxacin: S <=0.25      -  Ertapenem: S <=0.5      -  Gentamicin: S <=2      -  Imipenem: S <=1      -  Levofloxacin: S <=0.5      -  Meropenem: S <=1      -  Nitrofurantoin: S <=32 Should not be used to treat pyelonephritis      -  Piperacillin/Tazobactam: S <=8      -  Tigecycline: S <=2      -  Tobramycin: S <=2      -  Trimethoprim/Sulfamethoxazole: S <=0.5/9.5      Method Type: JOSE    Culture - Blood (collected 31 Jul 2021 10:16)  Source: .Blood Blood  Preliminary Report (01 Aug 2021 11:01):    No growth to date.    Culture - Blood (collected 31 Jul 2021 10:15)  Source: .Blood Blood  Preliminary Report (01 Aug 2021 11:01):    No growth to date.        < from: Xray Chest 2 Views PA/Lat (08.03.21 @ 11:16) >  IMPRESSION:  Clear lungs.    < end of copied text >

## 2021-08-04 NOTE — DISCHARGE NOTE NURSING/CASE MANAGEMENT/SOCIAL WORK - PATIENT PORTAL LINK FT
You can access the FollowMyHealth Patient Portal offered by [facility] by registering at the following website: [email or web address]. By joining [employer]’s FollowMyHealth portal, you will also be able to view your health information using other applications (apps) compatible with our system.

## 2021-08-04 NOTE — PHYSICAL THERAPY INITIAL EVALUATION ADULT - PRECAUTIONS/LIMITATIONS, REHAB EVAL
Abdomen/Pelvis CT 7/31/21: Mild to moderate left hydroureter to the level of an obstructing 8 mm calculus in the proximal left ureter./fall precautions

## 2021-08-04 NOTE — PHYSICAL THERAPY INITIAL EVALUATION ADULT - ADDITIONAL COMMENTS
Pt lives with his wife in a [place] house with 1 steps to enter and 1 flight of stairs, +HR. Pt was independent with all ADLs and ambulation PTA. Pt did not use a AD for ambulation PTA. +drives. +reading eyeglasses.

## 2021-08-04 NOTE — DISCHARGE NOTE NURSING/CASE MANAGEMENT/SOCIAL WORK - MODE OF TRANSPORTATION
Continue with Allopurinol at the current dose  Reduce colchicine to I tab  every other day or 1/2 tab daily    Wheelchair/Stroller

## 2021-08-05 ENCOUNTER — APPOINTMENT (OUTPATIENT)
Dept: FAMILY MEDICINE | Facility: CLINIC | Age: 76
End: 2021-08-05
Payer: MEDICARE

## 2021-08-05 VITALS
HEART RATE: 87 BPM | SYSTOLIC BLOOD PRESSURE: 132 MMHG | TEMPERATURE: 97.2 F | HEIGHT: 71 IN | DIASTOLIC BLOOD PRESSURE: 80 MMHG | BODY MASS INDEX: 25.34 KG/M2 | WEIGHT: 181 LBS | OXYGEN SATURATION: 94 %

## 2021-08-05 VITALS — DIASTOLIC BLOOD PRESSURE: 80 MMHG | HEART RATE: 72 BPM | SYSTOLIC BLOOD PRESSURE: 110 MMHG | RESPIRATION RATE: 16 BRPM

## 2021-08-05 LAB
CULTURE RESULTS: SIGNIFICANT CHANGE UP
CULTURE RESULTS: SIGNIFICANT CHANGE UP
SPECIMEN SOURCE: SIGNIFICANT CHANGE UP
SPECIMEN SOURCE: SIGNIFICANT CHANGE UP

## 2021-08-05 PROCEDURE — 99496 TRANSJ CARE MGMT HIGH F2F 7D: CPT

## 2021-08-05 NOTE — HISTORY OF PRESENT ILLNESS
[FreeTextEntry1] : U  [de-identified] : PT  WENT TO [facility] FOR ABD PAIN WITH KIDNEY STONE WENT INTO SEPTIC SHOCK AND BP WAS LOW AND TEMP 103.8 PT WAS DISCHARGED  YESTERDAY PT HAD STENT PUT IN AND STONE PUSHED BACK INTO KIDNEY PT WAS TREATED WITH CEFUROXIME 500 MG BID  FOR E-COLI UTI DILTIAZEM AND METOPROLOL ON HOLD PT WAS  ADMITTED ON fRIDAY BUT HAD CARDIOVERSION FOR AF THURSDAY  GOING FOR LITHOTRIPSY NEXT WEEK

## 2021-08-05 NOTE — ASSESSMENT
[FreeTextEntry1] : RESOLVING  SEPSIS AND KIDNEY STONE DISCUSSED  DIET  LOW SALT  LOW OXALATE LOW  MEAT INTAKE AND INCREASE FRUIT AND VEGETABLE CONTINUE VIT C

## 2021-08-05 NOTE — PHYSICAL EXAM
[No Acute Distress] : no acute distress [Normal TMs] : both tympanic membranes were normal [No Lymphadenopathy] : no lymphadenopathy [Clear to Auscultation] : lungs were clear to auscultation bilaterally [Regular Rhythm] : with a regular rhythm [No Edema] : there was no peripheral edema [Normal Supraclavicular Nodes] : no supraclavicular lymphadenopathy [Normal Posterior Cervical Nodes] : no posterior cervical lymphadenopathy [Normal Anterior Cervical Nodes] : no anterior cervical lymphadenopathy [No CVA Tenderness] : no CVA  tenderness [No Joint Swelling] : no joint swelling [No Rash] : no rash [Normal Insight/Judgement] : insight and judgment were intact

## 2021-08-12 ENCOUNTER — APPOINTMENT (OUTPATIENT)
Dept: FAMILY MEDICINE | Facility: CLINIC | Age: 76
End: 2021-08-12
Payer: MEDICARE

## 2021-08-12 VITALS
HEART RATE: 70 BPM | BODY MASS INDEX: 24.64 KG/M2 | DIASTOLIC BLOOD PRESSURE: 76 MMHG | OXYGEN SATURATION: 96 % | HEIGHT: 71 IN | WEIGHT: 176 LBS | TEMPERATURE: 97.3 F | SYSTOLIC BLOOD PRESSURE: 130 MMHG

## 2021-08-12 VITALS — DIASTOLIC BLOOD PRESSURE: 80 MMHG | SYSTOLIC BLOOD PRESSURE: 130 MMHG | RESPIRATION RATE: 16 BRPM | HEART RATE: 72 BPM

## 2021-08-12 PROCEDURE — 99214 OFFICE O/P EST MOD 30 MIN: CPT

## 2021-08-12 RX ORDER — NAPROXEN 500 MG/1
500 TABLET ORAL
Qty: 60 | Refills: 0 | Status: COMPLETED | COMMUNITY
Start: 2019-02-11 | End: 2021-08-12

## 2021-08-12 NOTE — HISTORY OF PRESENT ILLNESS
[FreeTextEntry1] : pt here for managemenT OF f HTN AF HLD  [de-identified] : GOING FOR LITHOTRIPSY IN SEPTEMBER CHECKS BP REGULAR  NO PALPS OR DIZZINESS CHECKS BP DAILY AND ON HIGH INTENSITY STATIN

## 2021-08-12 NOTE — REVIEW OF SYSTEMS
[Fever] : no fever [Earache] : no earache [Sore Throat] : no sore throat [Chest Pain] : no chest pain [Palpitations] : no palpitations [Shortness Of Breath] : no shortness of breath [Wheezing] : no wheezing [Cough] : no cough [Abdominal Pain] : no abdominal pain [Constipation] : no constipation [Diarrhea] : no diarrhea [Dysuria] : no dysuria [Joint Swelling] : no joint swelling [Skin Rash] : no skin rash [Headache] : no headache [Dizziness] : no dizziness [Swollen Glands] : no swollen glands

## 2021-08-19 ENCOUNTER — OUTPATIENT (OUTPATIENT)
Dept: OUTPATIENT SERVICES | Facility: HOSPITAL | Age: 76
LOS: 1 days | End: 2021-08-19
Payer: MEDICARE

## 2021-08-19 ENCOUNTER — APPOINTMENT (OUTPATIENT)
Dept: FAMILY MEDICINE | Facility: CLINIC | Age: 76
End: 2021-08-19

## 2021-08-19 VITALS
HEART RATE: 61 BPM | DIASTOLIC BLOOD PRESSURE: 78 MMHG | OXYGEN SATURATION: 100 % | RESPIRATION RATE: 12 BRPM | SYSTOLIC BLOOD PRESSURE: 129 MMHG | WEIGHT: 175.93 LBS | TEMPERATURE: 98 F | HEIGHT: 71 IN

## 2021-08-19 DIAGNOSIS — N20.0 CALCULUS OF KIDNEY: ICD-10-CM

## 2021-08-19 DIAGNOSIS — Z90.79 ACQUIRED ABSENCE OF OTHER GENITAL ORGAN(S): Chronic | ICD-10-CM

## 2021-08-19 DIAGNOSIS — Z01.818 ENCOUNTER FOR OTHER PREPROCEDURAL EXAMINATION: ICD-10-CM

## 2021-08-19 DIAGNOSIS — Z98.49 CATARACT EXTRACTION STATUS, UNSPECIFIED EYE: Chronic | ICD-10-CM

## 2021-08-19 LAB
ANION GAP SERPL CALC-SCNC: 10 MMOL/L — SIGNIFICANT CHANGE UP (ref 5–17)
BUN SERPL-MCNC: 16 MG/DL — SIGNIFICANT CHANGE UP (ref 7–23)
CALCIUM SERPL-MCNC: 10.4 MG/DL — SIGNIFICANT CHANGE UP (ref 8.4–10.5)
CHLORIDE SERPL-SCNC: 104 MMOL/L — SIGNIFICANT CHANGE UP (ref 96–108)
CO2 SERPL-SCNC: 23 MMOL/L — SIGNIFICANT CHANGE UP (ref 22–31)
CREAT SERPL-MCNC: 1.17 MG/DL — SIGNIFICANT CHANGE UP (ref 0.5–1.3)
GLUCOSE SERPL-MCNC: 100 MG/DL — HIGH (ref 70–99)
HCT VFR BLD CALC: 42.4 % — SIGNIFICANT CHANGE UP (ref 39–50)
HGB BLD-MCNC: 14 G/DL — SIGNIFICANT CHANGE UP (ref 13–17)
MCHC RBC-ENTMCNC: 31.6 PG — SIGNIFICANT CHANGE UP (ref 27–34)
MCHC RBC-ENTMCNC: 33 GM/DL — SIGNIFICANT CHANGE UP (ref 32–36)
MCV RBC AUTO: 95.7 FL — SIGNIFICANT CHANGE UP (ref 80–100)
NRBC # BLD: 0 /100 WBCS — SIGNIFICANT CHANGE UP (ref 0–0)
PLATELET # BLD AUTO: 390 K/UL — SIGNIFICANT CHANGE UP (ref 150–400)
POTASSIUM SERPL-MCNC: 4.3 MMOL/L — SIGNIFICANT CHANGE UP (ref 3.5–5.3)
POTASSIUM SERPL-SCNC: 4.3 MMOL/L — SIGNIFICANT CHANGE UP (ref 3.5–5.3)
RBC # BLD: 4.43 M/UL — SIGNIFICANT CHANGE UP (ref 4.2–5.8)
RBC # FLD: 14.2 % — SIGNIFICANT CHANGE UP (ref 10.3–14.5)
SODIUM SERPL-SCNC: 137 MMOL/L — SIGNIFICANT CHANGE UP (ref 135–145)
WBC # BLD: 5.99 K/UL — SIGNIFICANT CHANGE UP (ref 3.8–10.5)
WBC # FLD AUTO: 5.99 K/UL — SIGNIFICANT CHANGE UP (ref 3.8–10.5)

## 2021-08-19 PROCEDURE — 85027 COMPLETE CBC AUTOMATED: CPT

## 2021-08-19 PROCEDURE — 87086 URINE CULTURE/COLONY COUNT: CPT

## 2021-08-19 PROCEDURE — 80048 BASIC METABOLIC PNL TOTAL CA: CPT

## 2021-08-19 PROCEDURE — G0463: CPT

## 2021-08-19 RX ORDER — CEFAZOLIN SODIUM 1 G
2000 VIAL (EA) INJECTION ONCE
Refills: 0 | Status: DISCONTINUED | OUTPATIENT
Start: 2021-09-02 | End: 2021-09-16

## 2021-08-19 NOTE — H&P PST ADULT - HISTORY OF PRESENT ILLNESS
75 YO M PMH     ***Covid test scheduled for 8/30/2021 at [facility].    Seen in PACU    77 y/o M with PMH of afib on eliquis (had cardioversion yesterday), prostate ca s/p resection presenting with L flank pain that started in the evening. Patient states pain radiates to his groin. Has associated n/v. States pain is similar to stones in past. No fevers, chills, cp, sob, LE swelling s/p L ureteral stent. 77 YO M PMH afib s/p cardioversion 7/30/21 (currently on eliquis x4 weeks post cardioversion), HLD, recent hospitalization (7/31-8/4) for renal calculus c/b septic shock, presents to [facility] for CYSTOSCOPY, LEFT ESWL, POSSIBLE RETROGRADE, POSSIBLE CYSTOSCOPY, LEFT URETEROSCOPY, LASER LITHOTRIPSY, STENT REMOVAL/EXCHANGE 9/2/2021. Denies any palpitations, SOB, N/V, Covid symptoms (fever, chills, cough) or exposure.     ***Covid test scheduled for 8/30/2021 at [facility].

## 2021-08-19 NOTE — H&P PST ADULT - NSICDXPASTSURGICALHX_GEN_ALL_CORE_FT
PAST SURGICAL HISTORY:  H/O cataract extraction b/l    History of robot-assisted laparoscopic radical prostatectomy

## 2021-08-19 NOTE — H&P PST ADULT - PROBLEM SELECTOR PLAN 1
CYSTOSCOPY  LEFT ESWL  POSSIBLE RETROGRADE  POSSIBLE CYSTOSCOPY  LEFT URETEROSCOPY  LASER LITHOTRIPSY  STENT REMOVAL/EXCHANGE  Pre-op education provided - all questions answered   CBC, BMP, Ucx sent in PST  Pt will complete eliquis course on 8/26 post cardioversion

## 2021-08-19 NOTE — H&P PST ADULT - NSICDXPASTMEDICALHX_GEN_ALL_CORE_FT
PAST MEDICAL HISTORY:  History of cardioversion x5 per patient  last episode 7/30/21    HLD (hyperlipidemia)     Kidney stones     Paroxysmal atrial fibrillation     Prostate cancer     Sepsis uro  hospitalized @ [facility] 7/31/21

## 2021-08-20 LAB
CULTURE RESULTS: SIGNIFICANT CHANGE UP
SPECIMEN SOURCE: SIGNIFICANT CHANGE UP

## 2021-08-23 NOTE — CONSULT NOTE ADULT - PROBLEM/RECOMMENDATION-2
Patient: Chriss Alcaraz Date: 8/23/2021  YOB: 1955 Attending: Urban Farley MD  65 year old male     Chief Complaint: bowel resection, colostomy rev  Subjective: -sitting up in chair, no new complaints, stable mild Lt abd pain, no nausea    Medications/Infusions:  Scheduled:   • insulin glargine  12 Units Subcutaneous Daily   • insulin regular (human)   Subcutaneous 4 times per day   • furosemide  40 mg Intravenous Daily   • pantoprazole  40 mg Intravenous Daily   • PARENTERAL NUTRITION - DIETITIAN/PHARMACIST MANAGED   Does not apply See Admin Instructions   • sodium hypochlorite  1 application Topical Daily   • [Held by provider] furosemide  40 mg Oral Daily   • [Held by provider] polyethylene glycol  17 g Oral Daily   • [Held by provider] aspirin  81 mg Oral Daily   • [Held by provider] docusate sodium-sennosides  1 tablet Oral BID   • [Held by provider] clonazePAM  2 mg Oral QHS   • [Held by provider] rOPINIRole  0.25 mg Oral Daily   • [Held by provider] amLODIPine  5 mg Oral Daily   • [Held by provider] atorvastatin  40 mg Oral Daily   • enoxaparin  40 mg Subcutaneous Q24H   • sodium chloride (PF)  2 mL Intracatheter 2 times per day   • Potassium Standard Replacement Protocol   Does not apply See Admin Instructions   • Magnesium Standard Replacement Protocol   Does not apply See Admin Instructions            Vital Last Value 24 Hour Range  Temperature 98.7 °F (37.1 °C) Temp  Min: 98.7 °F (37.1 °C)  Max: 99.5 °F (37.5 °C)  Pulse 93 Pulse  Min: 93  Max: 98  Respiratory 17 Resp  Min: 16  Max: 19  Non-Invasive  Blood Pressure (!) 142/69 (Notified RN) (08/23/21 0550) BP  Min: 126/68  Max: 142/69  Arterial   Blood Pressure (!) 162/70 (07/24/21 1000) No data recorded  Oxygen Saturation 3 L/min NA  Pulse Oximetry 99 % SpO2  Min: 97 %  Max: 99 %    Vital Today Admitted  Weight 69 kg Weight: 83.8 kg  Height N/A Height: 5' 8\" (172.7 cm)  Body Mass Index N/A BMI (Calculated): 28.09    Weight over the past  48 Hours:  Patient Vitals for the past 48 hrs:   Weight   08/22/21 0505 78.3 kg   08/23/21 0550 69 kg       Physical Examination:   HEENT-  CV-RRR  Resp-CTAB  GI-mild Lt TTP; colostomy; drain  MSK-no edema  Skin-no rashes  Neuro-motor intact bilat      Laboratory Results:  Lab Results   Component Value Date    SODIUM 138 08/23/2021    POTASSIUM 4.4 08/23/2021    BUN 25 (H) 08/23/2021    CREATININE 0.61 (L) 08/23/2021    WBC 6.8 08/23/2021    HCT 23.8 (L) 08/23/2021    HGB 7.5 (L) 08/23/2021     (L) 08/23/2021    INR 1.3 08/13/2021    RAPDTR <0.02 06/06/2021    BNP 60.0 03/03/2012    GLUCOSE 143 (H) 08/23/2021    TSH 0.793 11/14/2016    MG 2.1 08/23/2021    CO2 26 08/23/2021    AST 24 08/23/2021    GPT 21 08/23/2021    ALKPT 66 08/23/2021    BILIRUBIN 0.3 08/23/2021    ALBUMIN 2.1 (L) 08/23/2021    CRP 22.0 (H) 08/01/2021    LIPA 114 07/18/2021       Urine Panel  Lab Results   Component Value Date    UKET Negative 08/11/2021    UPROT Negative 08/11/2021    UWBC Negative 08/11/2021    URBC Negative 08/11/2021    UBILI Negative 08/11/2021    UPH 5.0 08/11/2021    USPG 1.005 08/11/2021    UBACTR NONE SEEN 08/27/2019       Diagnosis/Plan:  Principal Problem:    Perforated bowel (CMS/HCC)-s/p resection and colostomy rev; cocnern for bowel leak; has now been on TPN and little liquids po. Surgery following. ABx per ID  Active Problems:    Essential hypertension, benign    Type 2 diabetes mellitus-cont insulins, adjust prn    Malignant neoplasm of left kidney (CMS/HCC)-f/u urology    Adjustment disorder with depressed mood    Normocytic anemia-monitor    GERD (gastroesophageal reflux disease)    CAD (coronary artery disease), native coronary artery-restart statin, asa when ok with surgery    Lactic acidosis    Hyperlipidemia    S/P colostomy (CMS/HCC)    Intra-abdominal abscess (CMS/HCC)-s/p IR drain; Abx per ID    Acute blood loss anemia-stable, ,onitopr    ENRIQUE-8/27  Barriers to dc-TPN  Disposition-home     DISPLAY PLAN FREE TEXT

## 2021-08-24 ENCOUNTER — APPOINTMENT (OUTPATIENT)
Dept: INTERNAL MEDICINE | Facility: CLINIC | Age: 76
End: 2021-08-24

## 2021-08-25 PROBLEM — E78.5 HYPERLIPIDEMIA, UNSPECIFIED: Chronic | Status: ACTIVE | Noted: 2021-08-19

## 2021-08-25 PROBLEM — A41.9 SEPSIS, UNSPECIFIED ORGANISM: Chronic | Status: ACTIVE | Noted: 2021-08-19

## 2021-08-25 PROBLEM — Z98.890 OTHER SPECIFIED POSTPROCEDURAL STATES: Chronic | Status: ACTIVE | Noted: 2021-08-19

## 2021-08-25 PROBLEM — N20.0 CALCULUS OF KIDNEY: Chronic | Status: ACTIVE | Noted: 2021-08-19

## 2021-08-26 ENCOUNTER — APPOINTMENT (OUTPATIENT)
Dept: INTERNAL MEDICINE | Facility: CLINIC | Age: 76
End: 2021-08-26
Payer: MEDICARE

## 2021-08-26 VITALS
RESPIRATION RATE: 16 BRPM | WEIGHT: 180 LBS | DIASTOLIC BLOOD PRESSURE: 82 MMHG | TEMPERATURE: 98.2 F | OXYGEN SATURATION: 96 % | HEART RATE: 65 BPM | HEIGHT: 71 IN | SYSTOLIC BLOOD PRESSURE: 135 MMHG | BODY MASS INDEX: 25.2 KG/M2

## 2021-08-26 DIAGNOSIS — A41.51 SEPSIS DUE TO ESCHERICHIA COLI [E. COLI]: ICD-10-CM

## 2021-08-26 DIAGNOSIS — A49.8 OTHER BACTERIAL INFECTIONS OF UNSPECIFIED SITE: ICD-10-CM

## 2021-08-26 PROCEDURE — 99204 OFFICE O/P NEW MOD 45 MIN: CPT

## 2021-08-26 NOTE — HISTORY OF PRESENT ILLNESS
[FreeTextEntry1] : This 77 y/o M with afib on eliquis, prostate ca, s/p resection \par \par Per patient, he was admitted to [facility] from 7/31 thru 8/4/21. \par after he developed  severe pain in the LEFT groin. \par L flank pain that started in the evening. Patient states pain radiates to his groin to his flank.\par Has associated n/v. \par \par He had hx of  left sided kidney stone. 1 yr prior had a right sided kidney stones\par He follow with Dr. Gary Goldberg Urologist. \par   \par He was told by his wife that he had fever of 104 at that time of presentation. \par \par \par Workup at [facility] done.  CT with obstructing 8 mm L stone. s/p L ureteral stent.\par \par last seen by ID Dr. Brandon Dan on 8/4/21 for:\par  fever - resolved \par Pyelo., Nephrolithiasis s/p stent\par E coli pan sensitive; s/p 6 days of IV abx\par he was given a course of Cefuroxime on discharge. \par which he completed. \par \par He is scheduled for \par 9/2/21\par at [facility]\par cystoscopy, left extracorporal shock wave lithotripsy, and/or possible left ureteroscopy, laser lithotripsy, stent\par \par HE feels generally well\par no more flank pain\par no fevers\par \par

## 2021-08-26 NOTE — PHYSICAL EXAM
[General Appearance - Alert] : alert [General Appearance - In No Acute Distress] : in no acute distress [Sclera] : the sclera and conjunctiva were normal [PERRL With Normal Accommodation] : pupils were equal in size, round, reactive to light [Extraocular Movements] : extraocular movements were intact [Outer Ear] : the ears and nose were normal in appearance [Oropharynx] : the oropharynx was normal with no thrush [Neck Appearance] : the appearance of the neck was normal [Neck Cervical Mass (___cm)] : no neck mass was observed [Jugular Venous Distention Increased] : there was no jugular-venous distention [Thyroid Diffuse Enlargement] : the thyroid was not enlarged [Auscultation Breath Sounds / Voice Sounds] : lungs were clear to auscultation bilaterally [Heart Rate And Rhythm] : heart rate was normal and rhythm regular [Heart Sounds] : normal S1 and S2 [Heart Sounds Gallop] : no gallops [Murmurs] : no murmurs [Heart Sounds Pericardial Friction Rub] : no pericardial rub [Full Pulse] : the pedal pulses are present [Edema] : there was no peripheral edema [Bowel Sounds] : normal bowel sounds [Abdomen Soft] : soft [Abdomen Tenderness] : non-tender [Abdomen Mass (___ Cm)] : no abdominal mass palpated [Costovertebral Angle Tenderness] : no CVA tenderness [No Palpable Adenopathy] : no palpable adenopathy [Musculoskeletal - Swelling] : no joint swelling [Nail Clubbing] : no clubbing  or cyanosis of the fingernails [Motor Tone] : muscle strength and tone were normal [Skin Color & Pigmentation] : normal skin color and pigmentation [] : no rash [Cranial Nerves] : cranial nerves 2-12 were intact [Sensation] : the sensory exam was normal to light touch and pinprick [No Focal Deficits] : no focal deficits [Oriented To Time, Place, And Person] : oriented to person, place, and time

## 2021-08-26 NOTE — DATA REVIEWED
[FreeTextEntry1] : \par \par \par  13.6\par 9.62 )-----------( 197 ( 04 Aug 2021 07:21 )\par  41.5\par \par \par 08-04\par \par 138 | 107 | 17\par ----------------------------< 111<H>\par 4.1 | 21<L> | 1.27\par \par Ca 10.2 04 Aug 2021 07:13\par Phos 2.1 08-04\par Mg 2.1 08-04\par \par \par \par \par Culture - Urine (collected 31 Jul 2021 16:38)\par Source: Kidney Kidney\par Final Report (02 Aug 2021 18:58):\par  Numerous Escherichia coli\par Organism: Escherichia coli (02 Aug 2021 18:58)\par Organism: Escherichia coli (02 Aug 2021 18:58)\par  - Amikacin: S <=16\par  - Amoxicillin/Clavulanic Acid: S <=8/4\par  - Ampicillin: S <=8 These ampicillin results predict results for\par amoxicillin\par  - Ampicillin/Sulbactam: S <=4/2 Enterobacter, Citrobacter, and Serratia\par may develop resistance during prolonged therapy (3-4 days)\par  - Aztreonam: S <=4\par  - Cefazolin: S <=2 (MIC_CL_COM_ENTERIC_CEFAZU) For uncomplicated UTI with\par K. pneumoniae, E. coli, or P. mirablis: JOSE <=16 is sensitive and JOSE >=32 is\par resistant. This also predicts results for oral agents cefaclor, cefdinir,\par cefpodoxime, cefprozil, cefuroxime axetil, cephalexin and locarbef for\par uncomplicated UTI. Note that some isolates may be susceptible to these agents\par while testing resistant to cefazolin.\par  - Cefepime: S <=2\par  - Cefoxitin: S <=8\par  - Ceftriaxone: S <=1 Enterobacter, Citrobacter, and Serratia may develop\par resistance during prolonged therapy\par  - Ciprofloxacin: S <=0.25\par  - Ertapenem: S <=0.5\par  - Gentamicin: S <=2\par  - Imipenem: S <=1\par  - Levofloxacin: S <=0.5\par  - Meropenem: S <=1\par  - Piperacillin/Tazobactam: S <=8\par  - Tigecycline: S <=2\par  - Tobramycin: S <=2\par  - Trimethoprim/Sulfamethoxazole: S <=0.5/9.5\par  Method Type: JOSE\par \par Culture - Urine (collected 31 Jul 2021 10:19)\par Source: Clean Catch Clean Catch (Midstream)\par Final Report (02 Aug 2021 19:48):\par  >100,000 CFU/ml Escherichia coli\par Organism: Escherichia coli (02 Aug 2021 19:48)\par Organism: Escherichia coli (02 Aug 2021 19:48)\par  - Amikacin: S <=16\par  - Amoxicillin/Clavulanic Acid: S <=8/4\par  - Ampicillin: S <=8 These ampicillin results predict results for\par amoxicillin\par  - Ampicillin/Sulbactam: S <=4/2 Enterobacter, Citrobacter, and Serratia\par may develop resistance during prolonged therapy (3-4 days)\par  - Aztreonam: S <=4\par  - Cefazolin: S <=2 (MIC_CL_COM_ENTERIC_CEFAZU) For uncomplicated UTI with\par K. pneumoniae, E. coli, or P. mirablis: JOSE <=16 is sensitive and JOSE >=32 is\par resistant. This also predicts results for oral agents cefaclor, cefdinir,\par cefpodoxime, cefprozil, cefuroxime axetil, cephalexin and locarbef for\par uncomplicated UTI. Note that some isolates may be susceptible to these agents\par while testing resistant to cefazolin.\par  - Cefepime: S <=2\par  - Cefoxitin: S <=8\par  - Ceftriaxone: S <=1 Enterobacter, Citrobacter, and Serratia may develop\par resistance during prolonged therapy\par  - Ciprofloxacin: S <=0.25\par  - Ertapenem: S <=0.5\par  - Gentamicin: S <=2\par  - Imipenem: S <=1\par  - Levofloxacin: S <=0.5\par  - Meropenem: S <=1\par  - Nitrofurantoin: S <=32 Should not be used to treat pyelonephritis\par  - Piperacillin/Tazobactam: S <=8\par  - Tigecycline: S <=2\par  - Tobramycin: S <=2\par  - Trimethoprim/Sulfamethoxazole: S <=0.5/9.5\par  Method Type: JOSE\par \par Culture - Blood (collected 31 Jul 2021 10:16)\par Source: .Blood Blood\par Preliminary Report (01 Aug 2021 11:01):\par  No growth to date.\par \par Culture - Blood (collected 31 Jul 2021 10:15)\par Source: .Blood Blood\par Preliminary Report (01 Aug 2021 11:01):\par  No growth to date.\par \par \par \par < from: Xray Chest 2 Views PA/Lat (08.03.21 @ 11:16) >\par IMPRESSION:\par Clear lungs.\par \par < end of copied text >

## 2021-08-26 NOTE — ASSESSMENT
[FreeTextEntry1] : was on IV antibiotics\par then got out of the hospital on oral antibiotics. - Cefuroxime\par \par \par \par patient scheduled for 9/2/21\par at [facility]\par cystoscopy, left extracorporal shock wave lithotripsy, and/or possible left ureteroscopy, laser lithotripsy, stent\par \par \par from ID perspective, he may proceed with this\par \par Rx given for Cefuroxime BID, x 5 days; to be started on 9/1/2021\par prior to his surgery\par \par he has follow up with Dr. Archer for pre-surgical clearance as well.

## 2021-08-30 ENCOUNTER — APPOINTMENT (OUTPATIENT)
Dept: DISASTER EMERGENCY | Facility: CLINIC | Age: 76
End: 2021-08-30

## 2021-08-31 ENCOUNTER — APPOINTMENT (OUTPATIENT)
Dept: FAMILY MEDICINE | Facility: CLINIC | Age: 76
End: 2021-08-31
Payer: MEDICARE

## 2021-08-31 VITALS — SYSTOLIC BLOOD PRESSURE: 130 MMHG | HEART RATE: 72 BPM | RESPIRATION RATE: 16 BRPM | DIASTOLIC BLOOD PRESSURE: 80 MMHG

## 2021-08-31 VITALS
WEIGHT: 182 LBS | HEIGHT: 71 IN | SYSTOLIC BLOOD PRESSURE: 134 MMHG | DIASTOLIC BLOOD PRESSURE: 82 MMHG | BODY MASS INDEX: 25.48 KG/M2 | HEART RATE: 71 BPM | TEMPERATURE: 97.9 F | OXYGEN SATURATION: 97 %

## 2021-08-31 DIAGNOSIS — Z01.818 ENCOUNTER FOR OTHER PREPROCEDURAL EXAMINATION: ICD-10-CM

## 2021-08-31 DIAGNOSIS — N20.0 CALCULUS OF KIDNEY: ICD-10-CM

## 2021-08-31 DIAGNOSIS — C61 MALIGNANT NEOPLASM OF PROSTATE: ICD-10-CM

## 2021-08-31 LAB — SARS-COV-2 N GENE NPH QL NAA+PROBE: NOT DETECTED

## 2021-08-31 PROCEDURE — 99214 OFFICE O/P EST MOD 30 MIN: CPT

## 2021-08-31 NOTE — RESULTS/DATA
[] : results reviewed [de-identified] : wbc 5.99  hgb 14.0  plts   390  [de-identified] : bun 16  creatinine  1.17  [de-identified] : af  no acute  changes  [de-identified] : c+s  urine neg

## 2021-08-31 NOTE — HISTORY OF PRESENT ILLNESS
[Atrial Fibrillation] : atrial fibrillation [No Pertinent Pulmonary History] : no history of asthma, COPD, sleep apnea, or smoking [No Adverse Anesthesia Reaction] : no adverse anesthesia reaction in self or family member [Chronic Anticoagulation] : chronic anticoagulation [(Patient denies any chest pain, claudication, dyspnea on exertion, orthopnea, palpitations or syncope)] : Patient denies any chest pain, claudication, dyspnea on exertion, orthopnea, palpitations or syncope [Moderate (4-6 METs)] : Moderate (4-6 METs) [Aortic Stenosis] : no aortic stenosis [Coronary Artery Disease] : no coronary artery disease [Recent Myocardial Infarction] : no recent myocardial infarction [Implantable Device/Pacemaker] : no implantable device/pacemaker [Chronic Kidney Disease] : no chronic kidney disease [Diabetes] : no diabetes [FreeTextEntry1] : lithotripsy  [FreeTextEntry2] : 9/2/21 [FreeTextEntry3] : Dr. Goldberg  [FreeTextEntry4] : Pt is a 76 yr old male here for clearance for lithotripsy left  kidney.  Pt active medical problems include hld  af  htn and kidney stones and  prostate cancer

## 2021-08-31 NOTE — REVIEW OF SYSTEMS
[Fever] : no fever [Earache] : no earache [Sore Throat] : no sore throat [Chest Pain] : no chest pain [Shortness Of Breath] : no shortness of breath [Palpitations] : no palpitations [Wheezing] : no wheezing [Cough] : no cough [Abdominal Pain] : no abdominal pain [Constipation] : no constipation [Diarrhea] : no diarrhea [Dysuria] : no dysuria [Joint Swelling] : no joint swelling [Skin Rash] : no skin rash [Headache] : no headache [Dizziness] : no dizziness [Swollen Glands] : no swollen glands

## 2021-08-31 NOTE — PHYSICAL EXAM
[No Acute Distress] : no acute distress [PERRL] : pupils equal round and reactive to light [Normal TMs] : both tympanic membranes were normal [Supple] : supple [Clear to Auscultation] : lungs were clear to auscultation bilaterally [Regular Rhythm] : with a regular rhythm [No Murmur] : no murmur heard [No Edema] : there was no peripheral edema [Normal] : soft, non-tender, non-distended, no masses palpated, no HSM and normal bowel sounds [Normal Supraclavicular Nodes] : no supraclavicular lymphadenopathy [Normal Posterior Cervical Nodes] : no posterior cervical lymphadenopathy [Normal Anterior Cervical Nodes] : no anterior cervical lymphadenopathy [No CVA Tenderness] : no CVA  tenderness [No Joint Swelling] : no joint swelling [No Rash] : no rash [No Focal Deficits] : no focal deficits [Normal Insight/Judgement] : insight and judgment were intact

## 2021-08-31 NOTE — ASSESSMENT
[High Risk Surgery - Intraperitoneal, Intrathoracic or Supringuinal Vascular Procedures] : High Risk Surgery - Intraperitoneal, Intrathoracic or Supringuinal Vascular Procedures - No (0) [Ischemic Heart Disease] : Ischemic Heart Disease - No (0) [Congestive Heart Failure] : Congestive Heart Failure - No (0) [Prior Cerebrovascular Accident or TIA] : Prior Cerebrovascular Accident or TIA - No (0) [Creatinine >= 2mg/dL (1 Point)] : Creatinine >= 2mg/dL - No (0) [Patient Optimized for Surgery] : Patient optimized for surgery [Insulin-dependent Diabetic (1 Point)] : Insulin-dependent Diabetic - No (0) [FreeTextEntry4] : acceptable medical condition for surgery\par

## 2021-09-01 ENCOUNTER — TRANSCRIPTION ENCOUNTER (OUTPATIENT)
Age: 76
End: 2021-09-01

## 2021-09-02 ENCOUNTER — OUTPATIENT (OUTPATIENT)
Dept: OUTPATIENT SERVICES | Facility: HOSPITAL | Age: 76
LOS: 1 days | End: 2021-09-02
Payer: MEDICARE

## 2021-09-02 ENCOUNTER — APPOINTMENT (OUTPATIENT)
Dept: INFECTIOUS DISEASE | Facility: CLINIC | Age: 76
End: 2021-09-02

## 2021-09-02 VITALS
HEIGHT: 71 IN | OXYGEN SATURATION: 97 % | RESPIRATION RATE: 18 BRPM | WEIGHT: 175.93 LBS | DIASTOLIC BLOOD PRESSURE: 81 MMHG | SYSTOLIC BLOOD PRESSURE: 167 MMHG | TEMPERATURE: 99 F | HEART RATE: 63 BPM

## 2021-09-02 VITALS
DIASTOLIC BLOOD PRESSURE: 64 MMHG | RESPIRATION RATE: 16 BRPM | HEART RATE: 55 BPM | SYSTOLIC BLOOD PRESSURE: 129 MMHG | TEMPERATURE: 98 F | OXYGEN SATURATION: 98 %

## 2021-09-02 DIAGNOSIS — N20.0 CALCULUS OF KIDNEY: ICD-10-CM

## 2021-09-02 DIAGNOSIS — Z90.79 ACQUIRED ABSENCE OF OTHER GENITAL ORGAN(S): Chronic | ICD-10-CM

## 2021-09-02 DIAGNOSIS — Z98.49 CATARACT EXTRACTION STATUS, UNSPECIFIED EYE: Chronic | ICD-10-CM

## 2021-09-02 PROCEDURE — 52310 CYSTOSCOPY AND TREATMENT: CPT

## 2021-09-02 PROCEDURE — 76000 FLUOROSCOPY <1 HR PHYS/QHP: CPT

## 2021-09-02 PROCEDURE — 74018 RADEX ABDOMEN 1 VIEW: CPT

## 2021-09-02 PROCEDURE — 50590 FRAGMENTING OF KIDNEY STONE: CPT | Mod: LT

## 2021-09-02 PROCEDURE — 74018 RADEX ABDOMEN 1 VIEW: CPT | Mod: 26

## 2021-09-02 RX ORDER — SODIUM CHLORIDE 9 MG/ML
3 INJECTION INTRAMUSCULAR; INTRAVENOUS; SUBCUTANEOUS EVERY 8 HOURS
Refills: 0 | Status: DISCONTINUED | OUTPATIENT
Start: 2021-09-02 | End: 2021-09-02

## 2021-09-02 RX ORDER — ONDANSETRON 8 MG/1
4 TABLET, FILM COATED ORAL ONCE
Refills: 0 | Status: DISCONTINUED | OUTPATIENT
Start: 2021-09-02 | End: 2021-09-02

## 2021-09-02 RX ORDER — HYDROMORPHONE HYDROCHLORIDE 2 MG/ML
0.5 INJECTION INTRAMUSCULAR; INTRAVENOUS; SUBCUTANEOUS
Refills: 0 | Status: DISCONTINUED | OUTPATIENT
Start: 2021-09-02 | End: 2021-09-02

## 2021-09-02 RX ORDER — LIDOCAINE HCL 20 MG/ML
0.2 VIAL (ML) INJECTION ONCE
Refills: 0 | Status: DISCONTINUED | OUTPATIENT
Start: 2021-09-02 | End: 2021-09-02

## 2021-09-02 RX ORDER — SODIUM CHLORIDE 9 MG/ML
1000 INJECTION, SOLUTION INTRAVENOUS
Refills: 0 | Status: DISCONTINUED | OUTPATIENT
Start: 2021-09-02 | End: 2021-09-16

## 2021-09-02 NOTE — BRIEF OPERATIVE NOTE - NSICDXBRIEFPREOP_GEN_ALL_CORE_FT
PRE-OP DIAGNOSIS:  Left renal stone 02-Sep-2021 09:58:04  Goldberg, Gary D  Left renal stone 02-Sep-2021 09:58:20  Goldberg, Gary D

## 2021-09-02 NOTE — PRE-ANESTHESIA EVALUATION ADULT - NSANTHADDINFOFT_GEN_ALL_CORE
Risks of dental trauma with airway management discussed.  Also discussed possibility of perioperative atrial fibrillation

## 2021-09-02 NOTE — ASU DISCHARGE PLAN (ADULT/PEDIATRIC) - CARE PROVIDER_API CALL
Goldberg, Gary D D  UROLOGY  [address] [place] [address], [address]  [place] [address]  Phone: [phone]  Fax: [phone]  Follow Up Time: 2 weeks

## 2021-09-02 NOTE — ASU DISCHARGE PLAN (ADULT/PEDIATRIC) - ASU DC SPECIAL INSTRUCTIONSFT
PAIN CONTROL: You may take 650 mg of Tylenol every 4-6 hours. Do not exceed more than 4000mg or 4 grams of Tylenol daily.    ELIQUIS: You may resume taking Eliquis on Sunday, 9/5/2021    BATHING: Please do not submerge wound underwater. You may shower after 48 hours and/or sponge bathe.    ACTIVITY: No heavy lifting or straining. Otherwise, you may return to your usual level of physical activity. If you are taking narcotic pain medications (such as oxycodone), do NOT drive a car, operate machinery or make important decisions.    DIET: Return to your usual diet    NOTIFY YOUR SURGEON IF: You have any bleeding that does not stop, any fevers (over 100.4F) or chills, persistent nausea/vomiting, persistent diarrhea, your pain is not controlled on your discharge pain medications, chest pain, shortness of breath or other worrisome symptoms arise.    FOLLOW UP:  1. Please call your surgeon to make a follow up appointment

## 2021-09-02 NOTE — PRE-ANESTHESIA EVALUATION ADULT - NSANTHPMHFT_GEN_ALL_CORE
76M PMH a. fib (last took Eliquis 10 days ago, s/p multiple cardioversions), HTN, HLD, recent hospitalization for left renal calculi s/p stent, c/b sepsis).

## 2021-09-02 NOTE — PRE-OP CHECKLIST - PATIENT'S PERSONAL PROPERTY GIVEN TO
Sw patient and make aware of AS recommendations  Patient verbalizes understanding and appreciative of call back  Patient scheduled for follow up AS next week  on unit

## 2021-09-02 NOTE — BRIEF OPERATIVE NOTE - NSICDXBRIEFPROCEDURE_GEN_ALL_CORE_FT
PROCEDURES:  ESWL, ureter, left, with cystosopic stent insertion 02-Sep-2021 09:57:51  Goldberg, Gary D

## 2021-09-10 ENCOUNTER — APPOINTMENT (OUTPATIENT)
Dept: ORTHOPEDIC SURGERY | Facility: CLINIC | Age: 76
End: 2021-09-10
Payer: MEDICARE

## 2021-09-10 DIAGNOSIS — M54.5 LOW BACK PAIN: ICD-10-CM

## 2021-09-10 PROCEDURE — 99214 OFFICE O/P EST MOD 30 MIN: CPT

## 2021-09-10 NOTE — PHYSICAL EXAM
[de-identified] : He is comfortable sitting today and straight leg raising is negative to 90 degrees in the sitting position. [de-identified] : I reviewed bony images of a CAT scan of the abdomen and pelvis from late July that revealed significant multilevel degenerative changes in the lumbar spine.

## 2021-09-10 NOTE — DISCUSSION/SUMMARY
[Medication Risks Reviewed] : Medication risks reviewed [de-identified] : He gets relief with an occasional Naprosyn and has been started on Naprosyn 500 mg twice a day along with omeprazole for GI protection.  He will continue the medication for 3 to 4 days after his symptoms have fully resolved.  He will call if there are problems with the medication or worsening of his symptoms and I will see him for follow-up in 4 weeks on a as needed basis.

## 2021-09-10 NOTE — HISTORY OF PRESENT ILLNESS
[de-identified] : He had a cardioversion for atrial fibrillation in early July and a few weeks later had a kidney stone complicated by urosepsis.  He has had increasing back pain since then.  I have treated him in the past years ago for back pain.  He has not had associated leg pain.  The back pain is intermittent and worse in the morning and with lifting.  There is no Valsalva effect.  He has had some night pain.  The pain is no worse sitting standing or walking.  After the cardioversion he was on Eliquis but it has been discontinued.

## 2021-09-28 ENCOUNTER — APPOINTMENT (OUTPATIENT)
Dept: ELECTROPHYSIOLOGY | Facility: CLINIC | Age: 76
End: 2021-09-28
Payer: MEDICARE

## 2021-10-04 ENCOUNTER — APPOINTMENT (OUTPATIENT)
Dept: FAMILY MEDICINE | Facility: CLINIC | Age: 76
End: 2021-10-04
Payer: MEDICARE

## 2021-10-04 PROCEDURE — G0008: CPT

## 2021-10-04 PROCEDURE — 90662 IIV NO PRSV INCREASED AG IM: CPT

## 2021-10-11 ENCOUNTER — LABORATORY RESULT (OUTPATIENT)
Age: 76
End: 2021-10-11

## 2021-10-12 ENCOUNTER — NON-APPOINTMENT (OUTPATIENT)
Age: 76
End: 2021-10-12

## 2021-10-12 ENCOUNTER — APPOINTMENT (OUTPATIENT)
Dept: CARDIOLOGY | Facility: CLINIC | Age: 76
End: 2021-10-12
Payer: MEDICARE

## 2021-10-12 VITALS
TEMPERATURE: 97.6 F | SYSTOLIC BLOOD PRESSURE: 128 MMHG | BODY MASS INDEX: 25.48 KG/M2 | OXYGEN SATURATION: 98 % | RESPIRATION RATE: 15 BRPM | HEIGHT: 71 IN | WEIGHT: 182 LBS | HEART RATE: 62 BPM | DIASTOLIC BLOOD PRESSURE: 83 MMHG

## 2021-10-12 PROCEDURE — 99214 OFFICE O/P EST MOD 30 MIN: CPT

## 2021-10-12 PROCEDURE — 93000 ELECTROCARDIOGRAM COMPLETE: CPT

## 2021-10-12 NOTE — DISCUSSION/SUMMARY
[FreeTextEntry1] : This is a 76-year-old male with past medical history significant for hypertension, hyperlipidemia, murmur, paroxysmal atrial fibrillation, who comes in for cardiac consultation.\par He denies chest pain, shortness of breath, dizziness or syncope.  He has no history of rheumatic fever.  He does not drink excessive caffeine or alcohol.\par His cardiac risk factors include hypertension, and hyperlipidemia.\par Electrocardiogram done October 12, 2021 demonstrates normal sinus rhythm at a rate of 62 bpm is otherwise remarkable for left atrial abnormality.\par He reports he had a normal exercise stress test at [facility] within the last year.  He will get me a copy of those results for my review.\par The patient's last episode of atrial fibrillation was May 21, 2021 requiring synchronized electrical DON guided cardioversion May 27, 2021.  The patient is against long-term anticoagulation for stroke prevention despite having a [id number] risk score of 3.\par Echo Doppler examination done March 3, 2021 demonstrated normal left ventricular function with an estimated ejection fraction of 55% mild left atrial dilatation.\par The patient does not wish to be on long-term anticoagulation because he feels that he goes in and out of atrial fibrillation a few times per year.\par He understands his risk of stroke.  When he does note atrial fibrillation he starts Eliquis 5 mg twice per day.\par After speaking with Dr. Carpenter, he is using the Kardia device daily to assess him for atrial fibrillation.\par He reports that he was hospitalized July 29, 2021 with atrial fibrillation requiring synchronized electrical cardioversion.  Few days later he was rehospitalized with kidney stones, requiring a ureteral stent, and subsequent removal of a stone.\par \par The patient's been seen by Dr. Carpenter of electrophysiology who recommended anticoagulation, and some sort of long-term monitoring including loop recorder, or daily EKG recording via Kardia device.  The role of ablation was also discussed.\par \par He will have blood work done today for lipid panel and SMA-20.  He does not wish to be on long-term anticoagulation and wants to continue his status quo.\par He will follow up with me in 2 months.  He will be seeing Dr. Carpenter in 1 month.\par Further recommendations regarding his lipid panel will be made once the results of his blood tests are available for my review.\par Patient has a clear understanding of his increased risk for stroke.\par \par The patient understands that aerobic exercises must be increased to 40 minutes 4 times per week. A detailed discussion of lifestyle modification was done today. The patient has a good understanding of the diagnosis, and treatment plan. Lifestyle modification was also outlined.

## 2021-10-12 NOTE — REASON FOR VISIT
[CV Risk Factors and Non-Cardiac Disease] : CV risk factors and non-cardiac disease [Arrhythmia/ECG Abnorrmalities] : arrhythmia/ECG abnormalities [Hyperlipidemia] : hyperlipidemia [Hypertension] : hypertension

## 2021-10-12 NOTE — PHYSICAL EXAM
[Well Developed] : well developed [Well Nourished] : well nourished [No Acute Distress] : no acute distress [Normal Conjunctiva] : normal conjunctiva [Normal Venous Pressure] : normal venous pressure [No Carotid Bruit] : no carotid bruit [Normal S1, S2] : normal S1, S2 [No Murmur] : no murmur [No Rub] : no rub [5th Left ICS - MCL] : palpated at the 5th LICS in the midclavicular line [Normal] : normal [No Precordial Heave] : no precordial heave was noted [Normal Rate] : normal [Rhythm Regular] : regular [Normal S1] : normal S1 [Normal S2] : normal S2 [No Gallop] : no gallop heard [II] : a grade 2 [I] : a grade 1 [No Pitting Edema] : no pitting edema present [2+] : left 2+ [No Abnormalities] : the abdominal aorta was not enlarged and no bruit was heard [Clear Lung Fields] : clear lung fields [Good Air Entry] : good air entry [No Respiratory Distress] : no respiratory distress  [Soft] : abdomen soft [Non Tender] : non-tender [No Masses/organomegaly] : no masses/organomegaly [Normal Bowel Sounds] : normal bowel sounds [Normal Gait] : normal gait [No Edema] : no edema [No Cyanosis] : no cyanosis [No Clubbing] : no clubbing [No Varicosities] : no varicosities [No Rash] : no rash [No Skin Lesions] : no skin lesions [Moves all extremities] : moves all extremities [No Focal Deficits] : no focal deficits [Normal Speech] : normal speech [Alert and Oriented] : alert and oriented [Normal memory] : normal memory [S3] : no S3 [S4] : no S4 [Right Carotid Bruit] : no bruit heard over the right carotid [Left Carotid Bruit] : no bruit heard over the left carotid [Right Femoral Bruit] : no bruit heard over the right femoral artery [Left Femoral Bruit] : no bruit heard over the left femoral artery

## 2021-10-15 ENCOUNTER — OUTPATIENT (OUTPATIENT)
Dept: OUTPATIENT SERVICES | Facility: HOSPITAL | Age: 76
LOS: 1 days | End: 2021-10-15
Payer: MEDICARE

## 2021-10-15 ENCOUNTER — APPOINTMENT (OUTPATIENT)
Dept: ULTRASOUND IMAGING | Facility: CLINIC | Age: 76
End: 2021-10-15
Payer: MEDICARE

## 2021-10-15 ENCOUNTER — APPOINTMENT (OUTPATIENT)
Dept: RADIOLOGY | Facility: CLINIC | Age: 76
End: 2021-10-15
Payer: MEDICARE

## 2021-10-15 DIAGNOSIS — Z00.8 ENCOUNTER FOR OTHER GENERAL EXAMINATION: ICD-10-CM

## 2021-10-15 DIAGNOSIS — Z98.49 CATARACT EXTRACTION STATUS, UNSPECIFIED EYE: Chronic | ICD-10-CM

## 2021-10-15 DIAGNOSIS — Z90.79 ACQUIRED ABSENCE OF OTHER GENITAL ORGAN(S): Chronic | ICD-10-CM

## 2021-10-15 DIAGNOSIS — N20.0 CALCULUS OF KIDNEY: ICD-10-CM

## 2021-10-15 PROCEDURE — 74018 RADEX ABDOMEN 1 VIEW: CPT

## 2021-10-15 PROCEDURE — 76775 US EXAM ABDO BACK WALL LIM: CPT | Mod: 26

## 2021-10-15 PROCEDURE — 76775 US EXAM ABDO BACK WALL LIM: CPT

## 2021-10-15 PROCEDURE — 74018 RADEX ABDOMEN 1 VIEW: CPT | Mod: 26

## 2021-10-19 ENCOUNTER — APPOINTMENT (OUTPATIENT)
Dept: ELECTROPHYSIOLOGY | Facility: CLINIC | Age: 76
End: 2021-10-19
Payer: MEDICARE

## 2021-10-19 ENCOUNTER — NON-APPOINTMENT (OUTPATIENT)
Age: 76
End: 2021-10-19

## 2021-10-19 VITALS
OXYGEN SATURATION: 97 % | DIASTOLIC BLOOD PRESSURE: 88 MMHG | HEIGHT: 71 IN | SYSTOLIC BLOOD PRESSURE: 147 MMHG | WEIGHT: 177 LBS | BODY MASS INDEX: 24.78 KG/M2 | HEART RATE: 70 BPM

## 2021-10-19 PROCEDURE — 99213 OFFICE O/P EST LOW 20 MIN: CPT

## 2021-10-19 PROCEDURE — 93000 ELECTROCARDIOGRAM COMPLETE: CPT

## 2021-10-19 RX ORDER — APIXABAN 5 MG/1
5 TABLET, FILM COATED ORAL
Qty: 180 | Refills: 1 | Status: DISCONTINUED | COMMUNITY
End: 2021-10-19

## 2021-10-19 NOTE — CARDIOLOGY SUMMARY
[de-identified] : Sinsu rhythm, HR 65, QTc of 386 msec [de-identified] : Stress echo 3/3/2021, exercise on mariluz protocol for 9 minutes achieving 85 % of MPHR. No evidence \par of ischemic on EKG/Echo. [de-identified] : 3/3/2021\par Normal LV systolic function with EF of 55 %.\par Normal RV size and systolic function.\par No significant valvular abnormalities.\par Mild left atrial dilatation

## 2021-10-19 NOTE — DISCUSSION/SUMMARY
[FreeTextEntry1] : 75 year old male with symptomatic persistent atrial fibrillation in the setting of hypertension.  Despite being counseled of his increased risk for stroke with his [id number] of 3 (age > 75 and hypertension) he is followed off oral AC.  He is adamant about an impossibility that he would not have asymptomatic AF despite being educated about the data. His MD has advised him of rapid initiation of AC and urgent cardioversion which has been occurring at a frequency of 1x/12 - 24 months.  I reaffirmed the recommendation for continued TE prophylaxis.  He uses his [employer] device daily to document a rhythm.  \par \par He will follow up with us in clinic in 6 months.

## 2021-10-19 NOTE — HISTORY OF PRESENT ILLNESS
[FreeTextEntry1] : 75 year old male with past medical history of hypertension, hyperlipidemia,  paroxysmal atrial fibrillation is here for follow up. He was last seen in clinic on April 15,2014. States he has been having sporadic episodes of atrial fibrillation since he was 20 years old. States he can feel when he is atrial fibrillation. Initially he was having episodes of atrial fibrillation every couple of years but now he is experiencing once every year. His last episode of atrial fibrillation was on 5/21/21 requiring DON cardioversion on 5/27/21. Prior episode of atrial fibrillation was about a year ago.  His symptoms in atrial fibrillation include palpitations. His cardiologist is Dr Barker and has been seen by Electrophysiologist at [facility] in the past. He is opposed to long term anticoagulation for stroke prevention. States he takes apixaban for short term  when he goes into atrial fibrillation. \par \par Overall he feels well. No chest pain. No shortness of breath.  No palpitations.  He is compliant with his [employer] device to document daily arrhythmias. \par

## 2021-10-27 ENCOUNTER — APPOINTMENT (OUTPATIENT)
Dept: FAMILY MEDICINE | Facility: CLINIC | Age: 76
End: 2021-10-27

## 2021-10-30 ENCOUNTER — APPOINTMENT (OUTPATIENT)
Dept: FAMILY MEDICINE | Facility: CLINIC | Age: 76
End: 2021-10-30

## 2021-11-20 ENCOUNTER — APPOINTMENT (OUTPATIENT)
Dept: FAMILY MEDICINE | Facility: CLINIC | Age: 76
End: 2021-11-20
Payer: MEDICARE

## 2021-11-20 PROCEDURE — 0003A: CPT

## 2021-12-01 ENCOUNTER — APPOINTMENT (OUTPATIENT)
Dept: CARDIOLOGY | Facility: CLINIC | Age: 76
End: 2021-12-01
Payer: MEDICARE

## 2021-12-01 VITALS
OXYGEN SATURATION: 97 % | RESPIRATION RATE: 15 BRPM | SYSTOLIC BLOOD PRESSURE: 130 MMHG | HEIGHT: 71 IN | WEIGHT: 183 LBS | TEMPERATURE: 98 F | HEART RATE: 72 BPM | BODY MASS INDEX: 25.62 KG/M2 | DIASTOLIC BLOOD PRESSURE: 80 MMHG

## 2021-12-01 DIAGNOSIS — I34.0 NONRHEUMATIC MITRAL (VALVE) INSUFFICIENCY: ICD-10-CM

## 2021-12-01 DIAGNOSIS — I73.9 PERIPHERAL VASCULAR DISEASE, UNSPECIFIED: ICD-10-CM

## 2021-12-01 DIAGNOSIS — I48.0 PAROXYSMAL ATRIAL FIBRILLATION: ICD-10-CM

## 2021-12-01 PROCEDURE — 99214 OFFICE O/P EST MOD 30 MIN: CPT

## 2021-12-01 PROCEDURE — 93922 UPR/L XTREMITY ART 2 LEVELS: CPT

## 2021-12-01 PROCEDURE — 93000 ELECTROCARDIOGRAM COMPLETE: CPT

## 2021-12-01 NOTE — DISCUSSION/SUMMARY
[FreeTextEntry1] : This is a 76-year-old male with past medical history significant for hypertension, hyperlipidemia, murmur, paroxysmal atrial fibrillation, who comes in for cardiac follow-up evaluation, He denies chest pain, shortness of breath, dizziness or syncope.  He has no history of rheumatic fever.  He does not drink excessive caffeine or alcohol.\par His cardiac risk factors include hypertension, and hyperlipidemia.\par The patient has been seen in consultation by Dr. Carpenter of electrophysiology.  The patient does not wish any further interventions at this time.  He monitors himself with his Kardia device.\par Electrocardiogram for review October 19, 2021 demonstrated normal sinus rhythm rate 68 bpm is otherwise unremarkable.\par I have discussed the use of a loop recorder with him.  He will take this under consideration and discuss this further with Dr. Carpenter.\par The patient is originally from [facility]\par He will have new blood work for lipid profile prior to his next visit.\par Blood work done October 12, 2021 demonstrated cholesterol 172, HDL of 44, triglycerides of 88, LDL of 110 mg/dL, non- mg/dL with a direct LDL of 108 mg/dL.\par The patient understands he must take his Lipitor on a regular basis.  If his LDL remains greater than 100 mg/dL I would recommend the addition of Zetia therapy.\par \par Electrocardiogram done October 12, 2021 demonstrates normal sinus rhythm at a rate of 62 bpm is otherwise remarkable for left atrial abnormality.\par He reports he had a normal exercise stress test at [facility] within the last year.  He will get me a copy of those results for my review.\par The patient's last episode of atrial fibrillation was May 21, 2021 requiring synchronized electrical DON guided cardioversion May 27, 2021.  The patient is against long-term anticoagulation for stroke prevention despite having a [id number] risk score of 3.\par Echo Doppler examination done March 3, 2021 demonstrated normal left ventricular function with an estimated ejection fraction of 55% mild left atrial dilatation.\par The patient does not wish to be on long-term anticoagulation because he feels that he goes in and out of atrial fibrillation a few times per year.\par He understands his risk of stroke.  When he does note atrial fibrillation he starts Eliquis 5 mg twice per day.\par After speaking with Dr. Carpenter, he is using the Kardia device daily to assess him for atrial fibrillation.\par He reports that he was hospitalized July 29, 2021 with atrial fibrillation requiring synchronized electrical cardioversion.  Few days later he was rehospitalized with kidney stones, requiring a ureteral stent, and subsequent removal of a stone.\par \par The patient understands that aerobic exercises must be increased to 40 minutes 4 times per week. A detailed discussion of lifestyle modification was done today. The patient has a good understanding of the diagnosis, and treatment plan. Lifestyle modification was also outlined.

## 2021-12-02 PROBLEM — I73.9 INTERMITTENT CLAUDICATION: Status: ACTIVE | Noted: 2021-12-02

## 2021-12-02 RX ORDER — CEFUROXIME AXETIL 500 MG/1
500 TABLET ORAL
Qty: 10 | Refills: 0 | Status: DISCONTINUED | COMMUNITY
Start: 2021-08-26 | End: 2021-12-02

## 2021-12-02 NOTE — ADDENDUM
[FreeTextEntry1] : A portion of this note was written by [Jordan Mast] on 12/02/2021 acting as a scribe for Dr. Wilder. \par \par I have personally reviewed the chart and agree that the record accurately reflects my personal performance of the history, physical exam, assessment, and plan.

## 2021-12-02 NOTE — HISTORY OF PRESENT ILLNESS
[FreeTextEntry1] : Mr. BAKARI MANTILLA comes in today for his urologic follow up. 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 Shane. Postoperatively he had a bladder stone causing urinary retention, which was managed by Dr. Salazar.  Since his prostate surgery, Mr. Mantilla has had stress incontinence and wears 3-4 pads per day.  In addition, he has ED. His PSAs have remained undetectable.\par \par From his general urologic history, Mr. Mantilla reports mild lower urinary tract symptoms, with nocturia x 1.\par IPSS: \par Sono: \par \par At the end of May 2020 he awakened with right flank pain and was diagnosed with a kidney stone  (see below). He then had a procedure for this, which was likely a ureteroscopy +/- a urethral dilation/bladder neck DVIU (no records available). \par \par Mr. Mantilla has erectile dysfunction.  Initially he tried Viagra, without any improvement.  He has been using Trimix, successfully, since his surgery. \par \par PSAs: 11/4/20--<0.01; 10/8/18--0.0; \par \par Prostate Bx: 7/8/05--Prostate adenocarcinoma. [place] 3+4=7; 6/14/05--[place] 3+3=6\par \par CT abd/pelvis: 12/2/20--3mm right and 6mm left renal stone.  No hydronephrosis; 6/23/20--4mm right midpole stone. 6mm right proximal ureteral stone. 5/28/20--6mm right UPJ stone with mild hydronephrosis. Bilateral renal stones 7mm in the left upper pole and 4mm in the right upper pole;  11/16/05--s/p prostatectomy. Bladder contains 2.1 cm calculus. \par \par Renal sono:  10/6/20--6mm right renal stone. 3.8cm septated left renal cyst.

## 2021-12-02 NOTE — PHYSICAL EXAM
[General Appearance - Well Developed] : well developed [General Appearance - Well Nourished] : well nourished [Normal Appearance] : normal appearance [Well Groomed] : well groomed [General Appearance - In No Acute Distress] : no acute distress [Abdomen Soft] : soft [Abdomen Tenderness] : non-tender [Abdomen Mass (___ Cm)] : no abdominal mass palpated [Abdomen Hernia] : no hernia was discovered [Costovertebral Angle Tenderness] : no ~M costovertebral angle tenderness [Urethral Meatus] : meatus normal [Penis Abnormality] : normal circumcised penis [Urinary Bladder Findings] : the bladder was normal on palpation [Scrotum] : the scrotum was normal [Epididymis] : the epididymides were normal [Testes Tenderness] : no tenderness of the testes [Testes Mass (___cm)] : there were no testicular masses [FreeTextEntry1] : Testicular asymmetry (Lt<Rt)--likely a consequence of an undescended testis managed with hormones.  [Skin Color & Pigmentation] : normal skin color and pigmentation [Edema] : no peripheral edema [] : no respiratory distress [Respiration, Rhythm And Depth] : normal respiratory rhythm and effort [Exaggerated Use Of Accessory Muscles For Inspiration] : no accessory muscle use [Oriented To Time, Place, And Person] : oriented to person, place, and time [Affect] : the affect was normal [Mood] : the mood was normal [Not Anxious] : not anxious [Normal Station and Gait] : the gait and station were normal for the patient's age [No Focal Deficits] : no focal deficits [No Palpable Adenopathy] : no palpable adenopathy

## 2021-12-06 ENCOUNTER — APPOINTMENT (OUTPATIENT)
Dept: UROLOGY | Facility: CLINIC | Age: 76
End: 2021-12-06

## 2021-12-15 NOTE — ASU DISCHARGE PLAN (ADULT/PEDIATRIC) - C. MAKE IMPORTANT PERSONAL OR BUSINESS DECISIONS
REVIEW OF SYSTEMS  CONSTITUTIONAL: No fever, no chills, no fatigue  EYES: No eye pain, no vision changes  ENMT:  No difficulty hearing, no throat pain  RESPIRATORY: No cough, no wheezing, no sputum production; No shortness of breath  CARDIOVASCULAR: No chest pain, no palpitations, no CHILDERS, no leg swelling  GASTROINTESTINAL: No abdominal pain, no nausea, no vomiting, no hematemesis, no diarrhea, no constipation, no melena, no hematochezia.  GENITOURINARY: No dysuria, no hematuria  NEUROLOGICAL: No headaches, no loss of strength, no numbness  SKIN: No itching, no rashes, no lesions   MUSCULOSKELETAL: No joint pain, no joint swelling; No muscle pain  HEME/LYMPH: No easy bruising, bleeding Statement Selected The patient is somnolent and unable to provide review of systems at this time.

## 2022-01-01 NOTE — PHYSICAL EXAM
[No Acute Distress] : no acute distress [Supple] : supple [PERRL] : pupils equal round and reactive to light [Normal Oropharynx] : the oropharynx was normal [Clear to Auscultation] : lungs were clear to auscultation bilaterally [Normal Rate] : normal rate  [No Edema] : there was no peripheral edema [No Murmur] : no murmur heard [Normal] : soft, non-tender, non-distended, no masses palpated, no HSM and normal bowel sounds [Normal Supraclavicular Nodes] : no supraclavicular lymphadenopathy [Normal Posterior Cervical Nodes] : no posterior cervical lymphadenopathy [Normal Anterior Cervical Nodes] : no anterior cervical lymphadenopathy [No CVA Tenderness] : no CVA  tenderness [No Focal Deficits] : no focal deficits [No Rash] : no rash [No Joint Swelling] : no joint swelling [Normal Insight/Judgement] : insight and judgment were intact No

## 2022-03-07 ENCOUNTER — APPOINTMENT (OUTPATIENT)
Dept: CARDIOLOGY | Facility: CLINIC | Age: 77
End: 2022-03-07

## 2022-04-08 ENCOUNTER — APPOINTMENT (OUTPATIENT)
Dept: MRI IMAGING | Facility: CLINIC | Age: 77
End: 2022-04-08
Payer: MEDICARE

## 2022-04-08 ENCOUNTER — OUTPATIENT (OUTPATIENT)
Dept: OUTPATIENT SERVICES | Facility: HOSPITAL | Age: 77
LOS: 1 days | End: 2022-04-08
Payer: MEDICARE

## 2022-04-08 DIAGNOSIS — H90.A21 SENSORINEURAL HEARING LOSS, UNILATERAL, RIGHT EAR, WITH RESTRICTED HEARING ON THE CONTRALATERAL SIDE: ICD-10-CM

## 2022-04-08 DIAGNOSIS — Z00.8 ENCOUNTER FOR OTHER GENERAL EXAMINATION: ICD-10-CM

## 2022-04-08 DIAGNOSIS — Z90.79 ACQUIRED ABSENCE OF OTHER GENITAL ORGAN(S): Chronic | ICD-10-CM

## 2022-04-08 DIAGNOSIS — Z98.49 CATARACT EXTRACTION STATUS, UNSPECIFIED EYE: Chronic | ICD-10-CM

## 2022-04-08 PROCEDURE — 70553 MRI BRAIN STEM W/O & W/DYE: CPT | Mod: MH

## 2022-04-08 PROCEDURE — 70553 MRI BRAIN STEM W/O & W/DYE: CPT | Mod: 26,MH

## 2022-04-08 PROCEDURE — A9585: CPT

## 2022-04-19 ENCOUNTER — NON-APPOINTMENT (OUTPATIENT)
Age: 77
End: 2022-04-19

## 2022-04-19 ENCOUNTER — APPOINTMENT (OUTPATIENT)
Dept: ELECTROPHYSIOLOGY | Facility: CLINIC | Age: 77
End: 2022-04-19
Payer: MEDICARE

## 2022-04-19 VITALS
OXYGEN SATURATION: 97 % | HEIGHT: 71 IN | BODY MASS INDEX: 25.06 KG/M2 | HEART RATE: 63 BPM | DIASTOLIC BLOOD PRESSURE: 90 MMHG | WEIGHT: 179 LBS | SYSTOLIC BLOOD PRESSURE: 162 MMHG

## 2022-04-19 VITALS — SYSTOLIC BLOOD PRESSURE: 155 MMHG | DIASTOLIC BLOOD PRESSURE: 81 MMHG

## 2022-04-19 PROCEDURE — 93000 ELECTROCARDIOGRAM COMPLETE: CPT

## 2022-04-19 PROCEDURE — 99213 OFFICE O/P EST LOW 20 MIN: CPT

## 2022-04-19 RX ORDER — ASPIRIN ENTERIC COATED TABLETS 81 MG 81 MG/1
81 TABLET, DELAYED RELEASE ORAL
Qty: 30 | Refills: 6 | Status: ACTIVE | COMMUNITY
Start: 2022-04-19

## 2022-04-19 NOTE — HISTORY OF PRESENT ILLNESS
[FreeTextEntry1] : 76 year old male with past medical history of hypertension, hyperlipidemia,  paroxysmal atrial fibrillation is here for follow up. He was last seen in clinic on April 15,2014. States he has been having sporadic episodes of atrial fibrillation since he was 20 years old. States he can feel when he is atrial fibrillation. Initially he was having episodes of atrial fibrillation every couple of years but now he is experiencing once every year. His last episode of atrial fibrillation was on 5/21/21 requiring DON cardioversion on 5/27/21. Prior episode of atrial fibrillation was about a year ago.  His symptoms in atrial fibrillation include palpitations. His cardiologist is Dr Barker and has been seen by Electrophysiologist at [facility] in the past. He is opposed to long term anticoagulation for stroke prevention. States he takes apixaban for short term  when he goes into atrial fibrillation. \par \par Overall he feels well. No chest pain. No shortness of breath.  No palpitations.  He is compliant with his ALIVE [employer] device to document daily arrhythmias.  He has been doing his exercising and living without limitations. \par

## 2022-04-19 NOTE — DISCUSSION/SUMMARY
[FreeTextEntry1] : 76 year old male with symptomatic persistent atrial fibrillation in the setting of hypertension.  Despite being counseled of his increased risk for stroke with his [id number] of 3 (age > 75 and hypertension) he is followed off oral AC.  He is adamant about an impossibility that he would not have asymptomatic AF despite being educated about the data. His MD has advised him of rapid initiation of AC and urgent cardioversion which has been occurring at a frequency of 1x/12 - 24 months.  I reaffirmed the recommendation for continued TE prophylaxis.  He uses and has been compliant with his [employer] device daily to document a rhythm.  \par \par He will follow up with us in clinic in 12 months.

## 2022-04-19 NOTE — CARDIOLOGY SUMMARY
[de-identified] : SR [de-identified] : Stress echo 3/3/2021, exercise on mariluz protocol for 9 minutes achieving 85 % of MPHR. No evidence \par of ischemic on EKG/Echo. [de-identified] : 3/3/2021\par Normal LV systolic function with EF of 55 %.\par Normal RV size and systolic function.\par No significant valvular abnormalities.\par Mild left atrial dilatation

## 2022-04-26 LAB
ALBUMIN SERPL ELPH-MCNC: 4.3 G/DL
ALP BLD-CCNC: 95 U/L
ALT SERPL-CCNC: 29 U/L
ANION GAP SERPL CALC-SCNC: 9 MMOL/L
AST SERPL-CCNC: 20 U/L
BASOPHILS # BLD AUTO: 0.07 K/UL
BASOPHILS NFR BLD AUTO: 1.2 %
BILIRUB SERPL-MCNC: 0.4 MG/DL
BUN SERPL-MCNC: 26 MG/DL
CALCIUM SERPL-MCNC: 10.2 MG/DL
CHLORIDE SERPL-SCNC: 105 MMOL/L
CHOLEST SERPL-MCNC: 185 MG/DL
CO2 SERPL-SCNC: 25 MMOL/L
CREAT SERPL-MCNC: 1.19 MG/DL
EGFR: 63 ML/MIN/1.73M2
EOSINOPHIL # BLD AUTO: 0.08 K/UL
EOSINOPHIL NFR BLD AUTO: 1.3 %
ESTIMATED AVERAGE GLUCOSE: 126 MG/DL
GLUCOSE SERPL-MCNC: 99 MG/DL
HBA1C MFR BLD HPLC: 6 %
HCT VFR BLD CALC: 51.3 %
HDLC SERPL-MCNC: 49 MG/DL
HGB BLD-MCNC: 16.3 G/DL
IMM GRANULOCYTES NFR BLD AUTO: 0.5 %
LDLC SERPL CALC-MCNC: 116 MG/DL
LYMPHOCYTES # BLD AUTO: 1.01 K/UL
LYMPHOCYTES NFR BLD AUTO: 16.9 %
MAN DIFF?: NORMAL
MCHC RBC-ENTMCNC: 31 PG
MCHC RBC-ENTMCNC: 31.8 GM/DL
MCV RBC AUTO: 97.5 FL
MONOCYTES # BLD AUTO: 0.58 K/UL
MONOCYTES NFR BLD AUTO: 9.7 %
NEUTROPHILS # BLD AUTO: 4.2 K/UL
NEUTROPHILS NFR BLD AUTO: 70.4 %
NONHDLC SERPL-MCNC: 136 MG/DL
PLATELET # BLD AUTO: 287 K/UL
POTASSIUM SERPL-SCNC: 5.3 MMOL/L
PROT SERPL-MCNC: 6.8 G/DL
RBC # BLD: 5.26 M/UL
RBC # FLD: 14.6 %
SODIUM SERPL-SCNC: 140 MMOL/L
T4 SERPL-MCNC: 6.5 UG/DL
TRIGL SERPL-MCNC: 99 MG/DL
TSH SERPL-ACNC: 2.02 UIU/ML
URATE SERPL-MCNC: 5.5 MG/DL
WBC # FLD AUTO: 5.97 K/UL

## 2022-04-28 ENCOUNTER — APPOINTMENT (OUTPATIENT)
Dept: FAMILY MEDICINE | Facility: CLINIC | Age: 77
End: 2022-04-28
Payer: MEDICARE

## 2022-04-28 VITALS — RESPIRATION RATE: 16 BRPM | HEART RATE: 72 BPM | DIASTOLIC BLOOD PRESSURE: 80 MMHG | SYSTOLIC BLOOD PRESSURE: 130 MMHG

## 2022-04-28 VITALS
HEART RATE: 70 BPM | BODY MASS INDEX: 25.76 KG/M2 | WEIGHT: 184 LBS | DIASTOLIC BLOOD PRESSURE: 70 MMHG | HEIGHT: 71 IN | TEMPERATURE: 98 F | SYSTOLIC BLOOD PRESSURE: 136 MMHG | OXYGEN SATURATION: 96 %

## 2022-04-28 DIAGNOSIS — I63.81 OTHER CEREBRAL INFARCTION DUE TO OCCLUSION OR STENOSIS OF SMALL ARTERY: ICD-10-CM

## 2022-04-28 PROCEDURE — 99214 OFFICE O/P EST MOD 30 MIN: CPT

## 2022-04-28 NOTE — ASSESSMENT
[FreeTextEntry1] : lacunae  infarct  continue asa bp at goal continue metoprolol and cardizem   hld continue atorvastatin

## 2022-04-28 NOTE — HISTORY OF PRESENT ILLNESS
[FreeTextEntry1] : pt here for management of hld htn  [de-identified] : pt saw ent for hearing loss had mri may have had cva in past saw neuro had lacunae infarct carotid us neg

## 2022-05-24 ENCOUNTER — APPOINTMENT (OUTPATIENT)
Dept: FAMILY MEDICINE | Facility: CLINIC | Age: 77
End: 2022-05-24
Payer: MEDICARE

## 2022-05-24 VITALS
HEART RATE: 67 BPM | DIASTOLIC BLOOD PRESSURE: 90 MMHG | HEIGHT: 71 IN | OXYGEN SATURATION: 98 % | SYSTOLIC BLOOD PRESSURE: 168 MMHG | RESPIRATION RATE: 16 BRPM | TEMPERATURE: 97.3 F | WEIGHT: 188 LBS | BODY MASS INDEX: 26.32 KG/M2

## 2022-05-24 VITALS — DIASTOLIC BLOOD PRESSURE: 80 MMHG | HEART RATE: 72 BPM | SYSTOLIC BLOOD PRESSURE: 144 MMHG | RESPIRATION RATE: 16 BRPM

## 2022-05-24 DIAGNOSIS — N39.0 URINARY TRACT INFECTION, SITE NOT SPECIFIED: ICD-10-CM

## 2022-05-24 LAB
BILIRUB UR QL STRIP: NEGATIVE
GLUCOSE UR-MCNC: NEGATIVE
HCG UR QL: 0.2 EU/DL
HGB UR QL STRIP.AUTO: NORMAL
KETONES UR-MCNC: NEGATIVE
LEUKOCYTE ESTERASE UR QL STRIP: NORMAL
NITRITE UR QL STRIP: NEGATIVE
PH UR STRIP: 6
PROT UR STRIP-MCNC: NEGATIVE
SP GR UR STRIP: 1.01

## 2022-05-24 PROCEDURE — 99213 OFFICE O/P EST LOW 20 MIN: CPT | Mod: 25

## 2022-05-24 PROCEDURE — 81003 URINALYSIS AUTO W/O SCOPE: CPT | Mod: QW

## 2022-05-24 NOTE — HISTORY OF PRESENT ILLNESS
[FreeTextEntry8] : pt saw  gu had renal us nop stones 2 wks later was given al rx for levoquin  pt has no urinary sx urine in office is negative for nitrates small leuk no burning  stream good no frequency

## 2022-05-27 ENCOUNTER — RX RENEWAL (OUTPATIENT)
Age: 77
End: 2022-05-27

## 2022-06-30 ENCOUNTER — RX RENEWAL (OUTPATIENT)
Age: 77
End: 2022-06-30

## 2022-09-09 ENCOUNTER — APPOINTMENT (OUTPATIENT)
Dept: FAMILY MEDICINE | Facility: CLINIC | Age: 77
End: 2022-09-09

## 2022-09-09 ENCOUNTER — MED ADMIN CHARGE (OUTPATIENT)
Age: 77
End: 2022-09-09

## 2022-09-09 PROCEDURE — G0008: CPT

## 2022-09-09 PROCEDURE — 90662 IIV NO PRSV INCREASED AG IM: CPT

## 2022-10-16 ENCOUNTER — EMERGENCY (EMERGENCY)
Facility: HOSPITAL | Age: 77
LOS: 1 days | Discharge: HOME | End: 2022-10-16
Attending: EMERGENCY MEDICINE
Payer: MEDICARE

## 2022-10-16 VITALS
HEART RATE: 92 BPM | WEIGHT: 169.98 LBS | TEMPERATURE: 98 F | OXYGEN SATURATION: 100 % | SYSTOLIC BLOOD PRESSURE: 192 MMHG | HEIGHT: 71 IN | RESPIRATION RATE: 18 BRPM | DIASTOLIC BLOOD PRESSURE: 93 MMHG

## 2022-10-16 DIAGNOSIS — Z98.49 CATARACT EXTRACTION STATUS, UNSPECIFIED EYE: Chronic | ICD-10-CM

## 2022-10-16 DIAGNOSIS — Z90.79 ACQUIRED ABSENCE OF OTHER GENITAL ORGAN(S): Chronic | ICD-10-CM

## 2022-10-16 PROCEDURE — 99284 EMERGENCY DEPT VISIT MOD MDM: CPT

## 2022-10-16 PROCEDURE — 99283 EMERGENCY DEPT VISIT LOW MDM: CPT

## 2022-10-17 VITALS
SYSTOLIC BLOOD PRESSURE: 132 MMHG | RESPIRATION RATE: 17 BRPM | HEART RATE: 64 BPM | TEMPERATURE: 98 F | OXYGEN SATURATION: 96 % | DIASTOLIC BLOOD PRESSURE: 84 MMHG

## 2022-10-17 LAB
ALBUMIN SERPL ELPH-MCNC: 4.5 G/DL — SIGNIFICANT CHANGE UP (ref 3.3–5)
ALP SERPL-CCNC: 86 U/L — SIGNIFICANT CHANGE UP (ref 40–120)
ALT FLD-CCNC: 31 U/L — SIGNIFICANT CHANGE UP (ref 10–45)
ANION GAP SERPL CALC-SCNC: 8 MMOL/L — SIGNIFICANT CHANGE UP (ref 5–17)
APPEARANCE UR: CLEAR — SIGNIFICANT CHANGE UP
AST SERPL-CCNC: 23 U/L — SIGNIFICANT CHANGE UP (ref 10–40)
BACTERIA # UR AUTO: ABNORMAL
BASOPHILS # BLD AUTO: 0.06 K/UL — SIGNIFICANT CHANGE UP (ref 0–0.2)
BASOPHILS NFR BLD AUTO: 0.6 % — SIGNIFICANT CHANGE UP (ref 0–2)
BILIRUB SERPL-MCNC: 0.3 MG/DL — SIGNIFICANT CHANGE UP (ref 0.2–1.2)
BILIRUB UR-MCNC: NEGATIVE — SIGNIFICANT CHANGE UP
BUN SERPL-MCNC: 30 MG/DL — HIGH (ref 7–23)
CALCIUM SERPL-MCNC: 10.5 MG/DL — SIGNIFICANT CHANGE UP (ref 8.4–10.5)
CHLORIDE SERPL-SCNC: 104 MMOL/L — SIGNIFICANT CHANGE UP (ref 96–108)
CO2 SERPL-SCNC: 25 MMOL/L — SIGNIFICANT CHANGE UP (ref 22–31)
COLOR SPEC: SIGNIFICANT CHANGE UP
CREAT SERPL-MCNC: 1.12 MG/DL — SIGNIFICANT CHANGE UP (ref 0.5–1.3)
DIFF PNL FLD: NEGATIVE — SIGNIFICANT CHANGE UP
EGFR: 67 ML/MIN/1.73M2 — SIGNIFICANT CHANGE UP
EOSINOPHIL # BLD AUTO: 0.46 K/UL — SIGNIFICANT CHANGE UP (ref 0–0.5)
EOSINOPHIL NFR BLD AUTO: 4.4 % — SIGNIFICANT CHANGE UP (ref 0–6)
EPI CELLS # UR: 0 /HPF — SIGNIFICANT CHANGE UP
FLUAV AG NPH QL: SIGNIFICANT CHANGE UP
FLUBV AG NPH QL: SIGNIFICANT CHANGE UP
GLUCOSE SERPL-MCNC: 129 MG/DL — HIGH (ref 70–99)
GLUCOSE UR QL: NEGATIVE — SIGNIFICANT CHANGE UP
HCT VFR BLD CALC: 48 % — SIGNIFICANT CHANGE UP (ref 39–50)
HGB BLD-MCNC: 16.2 G/DL — SIGNIFICANT CHANGE UP (ref 13–17)
HYALINE CASTS # UR AUTO: 2 /LPF — SIGNIFICANT CHANGE UP (ref 0–2)
IMM GRANULOCYTES NFR BLD AUTO: 0.6 % — SIGNIFICANT CHANGE UP (ref 0–0.9)
KETONES UR-MCNC: NEGATIVE — SIGNIFICANT CHANGE UP
LEUKOCYTE ESTERASE UR-ACNC: ABNORMAL
LIDOCAIN IGE QN: 26 U/L — SIGNIFICANT CHANGE UP (ref 7–60)
LYMPHOCYTES # BLD AUTO: 0.72 K/UL — LOW (ref 1–3.3)
LYMPHOCYTES # BLD AUTO: 6.9 % — LOW (ref 13–44)
MCHC RBC-ENTMCNC: 31.5 PG — SIGNIFICANT CHANGE UP (ref 27–34)
MCHC RBC-ENTMCNC: 33.8 GM/DL — SIGNIFICANT CHANGE UP (ref 32–36)
MCV RBC AUTO: 93.4 FL — SIGNIFICANT CHANGE UP (ref 80–100)
MONOCYTES # BLD AUTO: 0.53 K/UL — SIGNIFICANT CHANGE UP (ref 0–0.9)
MONOCYTES NFR BLD AUTO: 5.1 % — SIGNIFICANT CHANGE UP (ref 2–14)
NEUTROPHILS # BLD AUTO: 8.53 K/UL — HIGH (ref 1.8–7.4)
NEUTROPHILS NFR BLD AUTO: 82.4 % — HIGH (ref 43–77)
NITRITE UR-MCNC: NEGATIVE — SIGNIFICANT CHANGE UP
NRBC # BLD: 0 /100 WBCS — SIGNIFICANT CHANGE UP (ref 0–0)
PH UR: 7 — SIGNIFICANT CHANGE UP (ref 5–8)
PLATELET # BLD AUTO: 257 K/UL — SIGNIFICANT CHANGE UP (ref 150–400)
POTASSIUM SERPL-MCNC: 4.4 MMOL/L — SIGNIFICANT CHANGE UP (ref 3.5–5.3)
POTASSIUM SERPL-SCNC: 4.4 MMOL/L — SIGNIFICANT CHANGE UP (ref 3.5–5.3)
PROT SERPL-MCNC: 7.2 G/DL — SIGNIFICANT CHANGE UP (ref 6–8.3)
PROT UR-MCNC: ABNORMAL
RBC # BLD: 5.14 M/UL — SIGNIFICANT CHANGE UP (ref 4.2–5.8)
RBC # FLD: 14 % — SIGNIFICANT CHANGE UP (ref 10.3–14.5)
RBC CASTS # UR COMP ASSIST: 4 /HPF — SIGNIFICANT CHANGE UP (ref 0–4)
RSV RNA NPH QL NAA+NON-PROBE: SIGNIFICANT CHANGE UP
SARS-COV-2 RNA SPEC QL NAA+PROBE: SIGNIFICANT CHANGE UP
SODIUM SERPL-SCNC: 137 MMOL/L — SIGNIFICANT CHANGE UP (ref 135–145)
SP GR SPEC: 1.02 — SIGNIFICANT CHANGE UP (ref 1.01–1.02)
UROBILINOGEN FLD QL: NEGATIVE — SIGNIFICANT CHANGE UP
WBC # BLD: 10.36 K/UL — SIGNIFICANT CHANGE UP (ref 3.8–10.5)
WBC # FLD AUTO: 10.36 K/UL — SIGNIFICANT CHANGE UP (ref 3.8–10.5)
WBC UR QL: 24 /HPF — HIGH (ref 0–5)

## 2022-10-17 PROCEDURE — 36415 COLL VENOUS BLD VENIPUNCTURE: CPT

## 2022-10-17 PROCEDURE — 87077 CULTURE AEROBIC IDENTIFY: CPT

## 2022-10-17 PROCEDURE — 82565 ASSAY OF CREATININE: CPT

## 2022-10-17 PROCEDURE — 87086 URINE CULTURE/COLONY COUNT: CPT

## 2022-10-17 PROCEDURE — 85025 COMPLETE CBC W/AUTO DIFF WBC: CPT

## 2022-10-17 PROCEDURE — 99284 EMERGENCY DEPT VISIT MOD MDM: CPT | Mod: 25

## 2022-10-17 PROCEDURE — 87186 SC STD MICRODIL/AGAR DIL: CPT

## 2022-10-17 PROCEDURE — 96374 THER/PROPH/DIAG INJ IV PUSH: CPT | Mod: XU

## 2022-10-17 PROCEDURE — 84295 ASSAY OF SERUM SODIUM: CPT

## 2022-10-17 PROCEDURE — 82947 ASSAY GLUCOSE BLOOD QUANT: CPT

## 2022-10-17 PROCEDURE — 80053 COMPREHEN METABOLIC PANEL: CPT

## 2022-10-17 PROCEDURE — 74177 CT ABD & PELVIS W/CONTRAST: CPT | Mod: 26,MA

## 2022-10-17 PROCEDURE — 83690 ASSAY OF LIPASE: CPT

## 2022-10-17 PROCEDURE — 82803 BLOOD GASES ANY COMBINATION: CPT

## 2022-10-17 PROCEDURE — 74177 CT ABD & PELVIS W/CONTRAST: CPT | Mod: MA

## 2022-10-17 PROCEDURE — 83605 ASSAY OF LACTIC ACID: CPT

## 2022-10-17 PROCEDURE — 96375 TX/PRO/DX INJ NEW DRUG ADDON: CPT

## 2022-10-17 PROCEDURE — 82330 ASSAY OF CALCIUM: CPT

## 2022-10-17 PROCEDURE — 85018 HEMOGLOBIN: CPT

## 2022-10-17 PROCEDURE — 87637 SARSCOV2&INF A&B&RSV AMP PRB: CPT

## 2022-10-17 PROCEDURE — 85014 HEMATOCRIT: CPT

## 2022-10-17 PROCEDURE — 82435 ASSAY OF BLOOD CHLORIDE: CPT

## 2022-10-17 PROCEDURE — 81001 URINALYSIS AUTO W/SCOPE: CPT

## 2022-10-17 PROCEDURE — 84132 ASSAY OF SERUM POTASSIUM: CPT

## 2022-10-17 RX ORDER — OXYCODONE HYDROCHLORIDE 5 MG/1
1 TABLET ORAL
Qty: 9 | Refills: 0
Start: 2022-10-17 | End: 2022-10-19

## 2022-10-17 RX ORDER — ACETAMINOPHEN 500 MG
975 TABLET ORAL ONCE
Refills: 0 | Status: COMPLETED | OUTPATIENT
Start: 2022-10-17 | End: 2022-10-17

## 2022-10-17 RX ORDER — KETOROLAC TROMETHAMINE 30 MG/ML
15 SYRINGE (ML) INJECTION ONCE
Refills: 0 | Status: DISCONTINUED | OUTPATIENT
Start: 2022-10-17 | End: 2022-10-17

## 2022-10-17 RX ORDER — CIPROFLOXACIN LACTATE 400MG/40ML
1 VIAL (ML) INTRAVENOUS
Qty: 7 | Refills: 0
Start: 2022-10-17 | End: 2022-10-23

## 2022-10-17 RX ORDER — IBUPROFEN 200 MG
1 TABLET ORAL
Qty: 21 | Refills: 0
Start: 2022-10-17 | End: 2022-10-23

## 2022-10-17 RX ORDER — SODIUM CHLORIDE 9 MG/ML
1000 INJECTION INTRAMUSCULAR; INTRAVENOUS; SUBCUTANEOUS ONCE
Refills: 0 | Status: COMPLETED | OUTPATIENT
Start: 2022-10-17 | End: 2022-10-17

## 2022-10-17 RX ORDER — ONDANSETRON 8 MG/1
1 TABLET, FILM COATED ORAL
Qty: 6 | Refills: 0
Start: 2022-10-17 | End: 2022-10-18

## 2022-10-17 RX ORDER — TAMSULOSIN HYDROCHLORIDE 0.4 MG/1
1 CAPSULE ORAL
Qty: 30 | Refills: 0
Start: 2022-10-17 | End: 2022-11-15

## 2022-10-17 RX ORDER — ACETAMINOPHEN 500 MG
4 TABLET ORAL
Qty: 84 | Refills: 0
Start: 2022-10-17 | End: 2022-10-23

## 2022-10-17 RX ORDER — CEFTRIAXONE 500 MG/1
1000 INJECTION, POWDER, FOR SOLUTION INTRAMUSCULAR; INTRAVENOUS ONCE
Refills: 0 | Status: COMPLETED | OUTPATIENT
Start: 2022-10-17 | End: 2022-10-17

## 2022-10-17 RX ADMIN — CEFTRIAXONE 100 MILLIGRAM(S): 500 INJECTION, POWDER, FOR SOLUTION INTRAMUSCULAR; INTRAVENOUS at 08:50

## 2022-10-17 RX ADMIN — Medication 15 MILLIGRAM(S): at 04:39

## 2022-10-17 RX ADMIN — SODIUM CHLORIDE 1000 MILLILITER(S): 9 INJECTION INTRAMUSCULAR; INTRAVENOUS; SUBCUTANEOUS at 04:39

## 2022-10-17 NOTE — ED PROCEDURE NOTE - ATTENDING CONTRIBUTION TO CARE
I, Joseph Alamo, performed a history and physical exam of the patient and discussed their management with the resident and /or advanced care provider. I reviewed the resident and /or ACP's note and agree with the documented findings and plan of care. I was present and available for all procedures.

## 2022-10-17 NOTE — ED ADULT NURSE NOTE - OBJECTIVE STATEMENT
Pt arrives with c/o groin pain that radiates into abdomen and around to flank area. Pt stats he has PMH of kidney stones and prostate cancer. Denies painful urination or blood in urine. Pt is AAO, VSS, resp even and unlabored, GCS 15, NAD noted at this time.

## 2022-10-17 NOTE — ED ADULT NURSE NOTE - NSSEPSISSUSPECTED_ED_A_ED
No

## 2022-10-17 NOTE — ED PROVIDER NOTE - CLINICAL SUMMARY MEDICAL DECISION MAKING FREE TEXT BOX
76 yo M with PMH of PAF, kidney stones, prostate CA presenting with c/o of RLQ pain and groin pain that began this morning. Differential diagnosis includes but is not limited to hernia, kidney stones, UTI, appendicitis. would get labs, CTAP, and give analgesia as needed. dispo pending w/u.

## 2022-10-17 NOTE — ED PROVIDER NOTE - ATTENDING CONTRIBUTION TO CARE
MD Lopez:  patient seen and evaluated personally.   I agree with the History & Physical,  Impression & Plan other than what was detailed in my note.  MD Lopez  78 yo M with PMH of PAF, kidney stones, prostate CA presenting to ed w/ cc of rlq pain going to groin, came on suddenly, severe, no testicular pain, mainly in r groin, pain improved and is sharp, unknown if feels like prior stone, no urinary frequency, urgency, dsyuria, afebrile vitals stable  non toxic well appearing, NC/AT,  conjunctiva non conjected, sclera anicteric, moist mucous membranes, neck supple, heart sounds, normal, no mrg, lungs cta b/l no wrr, abd soft non distended w/ no tenderness, no visual deformities of extremities, axox3,  normal mood and affect, gu exam unremarkble, given degree of pain he was in (currently better) and age, will get ct scan screen for kidney stone, hernia.

## 2022-10-17 NOTE — ED PROVIDER NOTE - OBJECTIVE STATEMENT
76 yo M with PMH of PAF, kidney stones, prostate CA presenting with c/o of RLQ pain and groin pain that began this morning. He notes the pain became progressively worse. He had his prostate removed laparoscopically 15 yrs ago. No fever, chills, cough, CP, dyspnea, vomiting, diarrhea. He is nauseated. He denies urinary sx and has chronic incontinence after his prostate sx. He has chronic constipation.

## 2022-10-17 NOTE — ED PROVIDER NOTE - NSFOLLOWUPINSTRUCTIONS_ED_ALL_ED_FT
Please follow up with your primary care physician within 2-3 days.   Return to the ER for any new or concerning symptoms.   You may take 650 mg acetaminophen every eight hours as needed for pain.   Drink plenty of fluids and rest. DISCHARGE INSTRUCTIONS:    Please follow up with your primary care physician within 2-3 days. Please also follow up with a urologic specialist within 1 week (contact info provided below).    PLEASE TAKE THE FOLLOWING MEDICATION AS PRESCRIBED (for UTI): ciprofloxacin, 1 tab, once per day, for 7 days. This is an antibiotic medication. Please completely finish this medication even if you begin to feel better.    Return to the ER for any new or concerning symptoms.     Drink plenty of fluids and rest. Strain your urine every time you have to urinate to catch your kidney stone. Bring your stone with you to your urologist's office.     Please alternate tylenol and ibuprofen for pain. You can take oxycodone for breakthrough pain. Please take as directed. Oxycodone is an opiate medication, please do not drink alcohol, drive, or operate heavy machinery while taking this medication.  Take zofran for nausea as needed.   Take flomax once a day at nighttime - this is a medication to help pass the stone.     Return to the emergency department if:   •You are vomiting and it is not relieved with medicine.    Call your doctor or kidney specialist if:   •You have a fever.  •You have trouble urinating.  •You see blood in your urine.  •You have severe pain.  •You have any questions or concerns about your condition or care.

## 2022-10-17 NOTE — ED ADULT NURSE NOTE - CADM POA CENTRAL LINE
Patient called and stated he spoke with some one at the insurance company and they stated to him he can give a number to his doctors office for a peer to peer. [facility] insurance company can be contacted at [phone]. Please call patient and advise    No

## 2022-10-17 NOTE — ED PROVIDER NOTE - CARE PROVIDERS DIRECT ADDRESSES
,DirectAddress_Unknown,[email or web address]

## 2022-10-17 NOTE — ED PROVIDER NOTE - NSICDXPASTMEDICALHX_GEN_ALL_CORE_FT
PAST MEDICAL HISTORY:  History of cardioversion x5 per patient  last episode 7/30/21    HLD (hyperlipidemia)     Kidney stones     Paroxysmal atrial fibrillation     Prostate cancer     Sepsis uro  hospitalized @ [facility] 7/31/21

## 2022-10-17 NOTE — ED PROVIDER NOTE - PHYSICAL EXAMINATION
General: WN/WD NAD  Head: Atraumatic, normocephalic  Eyes: EOM grossly in tact, no scleral icterus, no discharge  ENT: moist mucous membranes  Neurology: A&Ox 3, nonfocal, FRYE x 4  Respiratory:  normal respiratory effort  CV: Extremities warm and well perfused  Abdominal: Soft, non-distended, non-tender, no masses  Extremities: No edema, no deformities, normal hip ROM  Skin: warm and dry. No rashes  : non tender testes B/L, urine leakage.

## 2022-10-17 NOTE — CONSULT NOTE ADULT - ASSESSMENT
76yo male with right sided found to have 4mm right UVJ stone  - pain is currently controlled, patient wants to go home  - ok for d/c with ABX, flomax and pain medication   - discussed with patient alternating tylenol and toradol for pain with oxycodone for breakthrough   - strain urine for stone  - return precautions given to patient and wife  - will fu our office outpatient    Gene Tavera MD  [facility] [place] [id number] [phone]
76yo male with right sided abdominal pain 2/2 4mm right UVJ stone, currently pain controlled after tylenol and toradol. UA with chronic bacteria present.   - d/c with ABX, flomax and pain medication   - discussed with patient alternating tylenol and toradol for pain with oxycodone for breakthrough   - will follow up shortly with most recent urine culture results from Dr. Gary Goldberg's office (previously resistant to bactrim and amp)  - strain urine  - increase hydration  - patient on high alert to return to ED if any fevers/chills or worsening symptoms  - discussed with Dr. Goldberg

## 2022-10-17 NOTE — ED ADULT NURSE NOTE - NSICDXPASTMEDICALHX_GEN_ALL_CORE_FT
PAST MEDICAL HISTORY:  History of cardioversion x5 per patient  last episode 7/30/21    HLD (hyperlipidemia)     Kidney stones     Paroxysmal atrial fibrillation     Prostate cancer     Sepsis uro  hospitalized @ [facility] 7/31/21

## 2022-10-17 NOTE — ED PROVIDER NOTE - PATIENT PORTAL LINK FT
You can access the FollowMyHealth Patient Portal offered by [facility] by registering at the following website: [email or web address]. By joining [facility]’s FollowMyHealth portal, you will also be able to view your health information using other applications (apps) compatible with our system.

## 2022-10-17 NOTE — ED PROVIDER NOTE - CARE PROVIDER_API CALL
Gene Tavera)  Urology  [address] [place] [address]  Phone: [phone]  Fax: [phone]  Follow Up Time:     Goldberg, Gary D D  UROLOGY  [address] [place] [address], [address]  [place] [address]  Phone: [phone]  Fax: [phone]  Established Patient  Follow Up Time:

## 2022-10-17 NOTE — ED PROVIDER NOTE - PROGRESS NOTE DETAILS
Lindsey Walden, PGY-2, EM: s/w uro, they will evaluate the pt. Pt stable, requesting discharge. Discussed results of workup, importance of follow-up with urology and PMD, cipro for UTI, flomax and zofran for symptoms, tylenol/ibuprofen/oxy for pain, and return precautions with patient who verbalized understanding and agreement. Pt had opportunity to ask questions and address concerns, and results of workup including lab and imaging were provided in DC paperwork. Patient is medically clear for DC at this time. -Ulysses Pfeiffer, PGY-2

## 2022-10-17 NOTE — CONSULT NOTE ADULT - SUBJECTIVE AND OBJECTIVE BOX
HPI:  76yo male h/o prostate ca s/p RALP 15 years ago c/b bladder neck contracture, nephrolithiasis c/b sepsis, afib, presenting to ED with right groin pain found to have a 4mm right UVJ stone. Patient states pain started as an ache yesterday, overnight became worse, right abdominal pain. Does not radiate to flank. Pain associated with nausea but no vomiting. Denies fever/ chills, flank pain, hematuria, dysuria, frequency. Currently pain controlled after tylenol and toradol.   States his UA chronically has bacteria present, but he is not symptomatic so its typically not treated.       PAST MEDICAL & SURGICAL HISTORY:  Paroxysmal atrial fibrillation  Prostate cancer  Kidney stones  HLD (hyperlipidemia)  History of cardioversion  x5 per patient  last episode 21  Sepsis  uro  hospitalized @ [facility] 21  History of robot-assisted laparoscopic radical prostatectomy  H/O cataract extraction  b/l    FAMILY HISTORY:    SOCIAL HISTORY:   Tobacco hx:    MEDICATIONS  (STANDING):  cefTRIAXone   IVPB 1000 milliGRAM(s) IV Intermittent once    MEDICATIONS  (PRN):    Allergies    quinidine (Other)  sulfa drugs (Other)    Intolerances        REVIEW OF SYSTEMS: Pertinent positives and negatives as stated in HPI, otherwise negative    Vital signs  T(C): 36.6 (10-17-22 @ 04:30), Max: 36.6 (10-16-22 @ 23:02)  HR: 63 (10-17-22 @ 04:30)  BP: 150/62 (10-17-22 @ 04:30)  SpO2: 97% (10-17-22 @ 04:30)  Wt(kg): --    Output    UOP    Physical Exam  Gen: NAD  Pulm: No respiratory distress, no subcostal retractions  CV: RRR, no JVD  Abd: Soft, NT, ND, no CVAT  : voiding clear yellow urine   MSK: No edema present    LABS:      10-17 @ 01:42    WBC 10.36 / Hct 48.0  / SCr 1.12     10-17    137  |  104  |  30<H>  ----------------------------<  129<H>  4.4   |  25  |  1.12    Ca    10.5      17 Oct 2022 01:42    TPro  7.2  /  Alb  4.5  /  TBili  0.3  /  DBili  x   /  AST  23  /  ALT  31  /  AlkPhos  86  10-17      Urinalysis Basic - ( 17 Oct 2022 01:46 )    Color: Light Yellow / Appearance: Clear / S.016 / pH: x  Gluc: x / Ketone: Negative  / Bili: Negative / Urobili: Negative   Blood: x / Protein: Trace / Nitrite: Negative   Leuk Esterase: Large / RBC: 4 /hpf / WBC 24 /HPF   Sq Epi: x / Non Sq Epi: 0 /hpf / Bacteria: Few        Urine Cx: pending       RADIOLOGY:    < from: CT Abdomen and Pelvis w/ IV Cont (10.17.22 @ 02:27) >  FINDINGS:  LOWER CHEST: Coronary artery calcifications. Trace bibasilar dependent   atelectasis.    LIVER: Unchanged right hepatic cyst. Left hypodensities too small to   characterize.  BILE DUCTS: Normal caliber.  GALLBLADDER: Within normal limits.  SPLEEN: Within normal limits.  PANCREAS: Within normal limits.  ADRENALS: Within normal limits.  KIDNEYS/URETERS: Increased, now borderline moderate right-sided   hydroureteronephrosis to the level of a 4 mm stone at the   ureterovesicular junction. It is unclear if this is the same stone that   was in the distal ureter on the prior exam or a new calculi. Bilateral   parapelvic cysts. Left punctate nonobstructing intrarenal stone.   Nonspecific symmetric perinephric stranding which was asymmetric to the   left on the prior exam.    BLADDER: Circumferential wall thickening similar to prior exam.  REPRODUCTIVE ORGANS: Prostatectomy.    BOWEL: No bowel obstruction. Appendix is normal.  PERITONEUM: No ascites.  VESSELS:Within normal limits.  RETROPERITONEUM/LYMPH NODES: No lymphadenopathy.  ABDOMINAL WALL: Bilateral fat-containing inguinal hernias.  BONES: Degenerative changes.    IMPRESSION:  Mild to moderate right-sided hydroureteronephrosis to the level of a 4 mm   obstructing stone at the right UVJ. Nonspecific symmetric perinephric   stranding. Bladder wall thickening similar to the prior exam. Please   correlate clinically with urinalysis and urine culture is concern for   superimposed infection.    < end of copied text >  
Urology Consult Note    Chief Complaint: flank pain      History of Present Illness: 78yo male h/o prostate ca s/p RALP 15 years ago c/b bladder neck contracture, nephrolithiasis c/b sepsis, afib, presenting to ED with right groin pain found to have a 4mm right UVJ stone. Patient states pain started as an ache yesterday, overnight became worse, right abdominal pain. Does not radiate to flank. Pain associated with nausea but no vomiting. Denies fever/ chills, flank pain, hematuria, dysuria, frequency. Currently pain controlled after tylenol and toradol. He is asking to go home. wbc 10 cr 1.1. Has chronic bacteruria      PAST MEDICAL & SURGICAL HISTORY:  Paroxysmal atrial fibrillation      Prostate cancer      Kidney stones      HLD (hyperlipidemia)      History of cardioversion  x5 per patient  last episode 21      Sepsis  uro  hospitalized @ [facility] 21      History of robot-assisted laparoscopic radical prostatectomy      H/O cataract extraction  b/l          FAMILY HISTORY:      Allergies    quinidine (Other)  sulfa drugs (Other)    Intolerances        Social History:  Denies tobacco or alcohol use    Review of Systems:   Constitutional: No weight loss, no weakness  HEENT: No visual loss, no hearing loss, no sneezing  Skin: No rash or itching  CV: No chest pain, no chest pressure  Pulm: No shortness of breath, cough, or sputum  GI: No melena, no constipation  : Per HPI  Neuro: No headache, dizziness  MSK: No muscle pain, no joint pain  Heme: No anemia, bruising, or bleeding  Lymphatics: No enlarged nodes, no history of splenectomy  Psych: No depression of anxiety  Endo: No cold or heat intolerance  Allergies: No asthma, hives    Physical Exam:  Vital signs  T(C): 36.4 (10-17-22 @ 07:45), Max: 36.6 (10-16-22 @ 23:02)  HR: 63 (10-17-22 @ 07:45)  BP: 147/78 (10-17-22 @ 07:45)  SpO2: 98% (10-17-22 @ 07:45)  Wt(kg): --    Gen: No acute distress. Normal mood  HEENT: Normocephalic, neck supple  CV: Hemodynamically stable  Pulm: No increased work of breathing  Abd: soft, non-tender, non-distended  Back: No CVA tenderness, no midline pain  Extremities: No significant deformity or joint abnormality  Neuro: Alert & oriented x3, No focal deficits  Skin: Skin normal color, normal texture  Psych: Normal affect, normal behavior  : Nonpalpable bladder no true cvat      Labs:      10-17 @ 01:42    WBC 10.36 / Hct 48.0  / SCr 1.12     10-17    137  |  104  |  30<H>  ----------------------------<  129<H>  4.4   |  25  |  1.12    Ca    10.5      17 Oct 2022 01:42    TPro  7.2  /  Alb  4.5  /  TBili  0.3  /  DBili  x   /  AST  23  /  ALT  31  /  AlkPhos  86  10-17      Urinalysis Basic - ( 17 Oct 2022 01:46 )    Color: Light Yellow / Appearance: Clear / S.016 / pH: x  Gluc: x / Ketone: Negative  / Bili: Negative / Urobili: Negative   Blood: x / Protein: Trace / Nitrite: Negative   Leuk Esterase: Large / RBC: 4 /hpf / WBC 24 /HPF   Sq Epi: x / Non Sq Epi: 0 /hpf / Bacteria: Few

## 2022-10-17 NOTE — ED PROVIDER NOTE - NS ED MD DISPO DISCHARGE CCDA
Delayed pharyngeal swallow/Cough post oral intake Within functional limits Patient/Caregiver provided printed discharge information.

## 2022-10-19 NOTE — ED POST DISCHARGE NOTE - ADDITIONAL DOCUMENTATION
10/20/22: Cipro still sensitive on final results. Appropriate care received. - Luis Alberto Fernandes PA-C

## 2023-01-09 NOTE — CHART NOTE - NSCHARTNOTEFT_GEN_A_CORE
Post-Op Assessment Note    CV Status:  Stable  Pain Score: 0    Pain management: adequate     Mental Status:  Alert and awake   Hydration Status:  Euvolemic   PONV Controlled:  Controlled   Airway Patency:  Patent      Post Op Vitals Reviewed: Yes            No notable events documented      BP      Temp     Pulse     Resp      SpO2 Cardiology fellow note    TTE normal. To discharge home on metoprolol and AC w/ eliquis 5 BID. To f/u with EP and cardiology as outpatient. No further inpatient cardiology recs. Cardiology will sign off.    Signed by  Aida Sabillon MD  PGY5 Cardiology no

## 2023-03-24 NOTE — ED CDU PROVIDER SUBSEQUENT DAY NOTE - NS ED MD PROGRESS NOTE ADD
Goal Outcome Evaluation:      Plan of Care Reviewed With: parent    Overall Patient Progress: no changeOverall Patient Progress: no change     [id number]. RR 40s. All other VSS. HFNC at 8L 21%. Sats mid 90s. NP suctioned x1. Mild belly breathing and subcostal retractions. Lungs coarse. Good PO intake. IVMF stopped. Mom and Dad at bedside.        Add Progress Note...

## 2023-04-06 ENCOUNTER — APPOINTMENT (OUTPATIENT)
Dept: FAMILY MEDICINE | Facility: CLINIC | Age: 78
End: 2023-04-06
Payer: MEDICARE

## 2023-04-06 VITALS — DIASTOLIC BLOOD PRESSURE: 70 MMHG | SYSTOLIC BLOOD PRESSURE: 130 MMHG | RESPIRATION RATE: 16 BRPM | HEART RATE: 72 BPM

## 2023-04-06 VITALS
HEART RATE: 78 BPM | HEIGHT: 71 IN | DIASTOLIC BLOOD PRESSURE: 80 MMHG | TEMPERATURE: 97.1 F | SYSTOLIC BLOOD PRESSURE: 124 MMHG | BODY MASS INDEX: 25.2 KG/M2 | WEIGHT: 180 LBS | OXYGEN SATURATION: 98 %

## 2023-04-06 DIAGNOSIS — J32.9 CHRONIC SINUSITIS, UNSPECIFIED: ICD-10-CM

## 2023-04-06 DIAGNOSIS — E78.00 PURE HYPERCHOLESTEROLEMIA, UNSPECIFIED: ICD-10-CM

## 2023-04-06 PROCEDURE — 99214 OFFICE O/P EST MOD 30 MIN: CPT

## 2023-04-06 NOTE — ASSESSMENT
[FreeTextEntry1] : amoxil if sx  gets worse  tessalon perles hld  continue atorvastatin  af  continue metoprolol

## 2023-04-06 NOTE — PHYSICAL EXAM
[No Acute Distress] : no acute distress [PERRL] : pupils equal round and reactive to light [Normal TMs] : both tympanic membranes were normal [Supple] : supple [Clear to Auscultation] : lungs were clear to auscultation bilaterally [Normal Rate] : normal rate  [No Edema] : there was no peripheral edema [Normal] : soft, non-tender, non-distended, no masses palpated, no HSM and normal bowel sounds [Normal Supraclavicular Nodes] : no supraclavicular lymphadenopathy [Normal Posterior Cervical Nodes] : no posterior cervical lymphadenopathy [Normal Anterior Cervical Nodes] : no anterior cervical lymphadenopathy [No CVA Tenderness] : no CVA  tenderness [No Joint Swelling] : no joint swelling [No Rash] : no rash [No Focal Deficits] : no focal deficits [Normal Insight/Judgement] : insight and judgment were intact

## 2023-04-07 LAB
ALBUMIN SERPL ELPH-MCNC: 4.6 G/DL
ALP BLD-CCNC: 88 U/L
ALT SERPL-CCNC: 28 U/L
ANION GAP SERPL CALC-SCNC: 12 MMOL/L
AST SERPL-CCNC: 25 U/L
BASOPHILS # BLD AUTO: 0.07 K/UL
BASOPHILS NFR BLD AUTO: 1.2 %
BILIRUB SERPL-MCNC: 0.5 MG/DL
BUN SERPL-MCNC: 24 MG/DL
CALCIUM SERPL-MCNC: 10.4 MG/DL
CHLORIDE SERPL-SCNC: 105 MMOL/L
CHOLEST SERPL-MCNC: 183 MG/DL
CO2 SERPL-SCNC: 22 MMOL/L
CREAT SERPL-MCNC: 1.24 MG/DL
EGFR: 60 ML/MIN/1.73M2
EOSINOPHIL # BLD AUTO: 0.04 K/UL
EOSINOPHIL NFR BLD AUTO: 0.7 %
ESTIMATED AVERAGE GLUCOSE: 123 MG/DL
GLUCOSE SERPL-MCNC: 92 MG/DL
HBA1C MFR BLD HPLC: 5.9 %
HCT VFR BLD CALC: 51.6 %
HDLC SERPL-MCNC: 44 MG/DL
HGB BLD-MCNC: 16.8 G/DL
IMM GRANULOCYTES NFR BLD AUTO: 0.2 %
LDLC SERPL CALC-MCNC: 117 MG/DL
LYMPHOCYTES # BLD AUTO: 0.96 K/UL
LYMPHOCYTES NFR BLD AUTO: 16.9 %
MAN DIFF?: NORMAL
MCHC RBC-ENTMCNC: 32.4 PG
MCHC RBC-ENTMCNC: 32.6 GM/DL
MCV RBC AUTO: 99.4 FL
MONOCYTES # BLD AUTO: 0.91 K/UL
MONOCYTES NFR BLD AUTO: 16 %
NEUTROPHILS # BLD AUTO: 3.68 K/UL
NEUTROPHILS NFR BLD AUTO: 65 %
NONHDLC SERPL-MCNC: 139 MG/DL
PLATELET # BLD AUTO: 274 K/UL
POTASSIUM SERPL-SCNC: 4.8 MMOL/L
PROT SERPL-MCNC: 7.2 G/DL
RBC # BLD: 5.19 M/UL
RBC # FLD: 14.8 %
SODIUM SERPL-SCNC: 139 MMOL/L
T4 SERPL-MCNC: 7.1 UG/DL
TRIGL SERPL-MCNC: 110 MG/DL
TSH SERPL-ACNC: 1.38 UIU/ML
URATE SERPL-MCNC: 6.1 MG/DL
WBC # FLD AUTO: 5.67 K/UL

## 2023-04-08 LAB
APPEARANCE: CLEAR
BILIRUBIN URINE: NEGATIVE
BLOOD URINE: NEGATIVE
COLOR: YELLOW
GLUCOSE QUALITATIVE U: NEGATIVE MG/DL
KETONES URINE: NEGATIVE MG/DL
LEUKOCYTE ESTERASE URINE: NEGATIVE
NITRITE URINE: NEGATIVE
PH URINE: 6
PROTEIN URINE: NORMAL MG/DL
SPECIFIC GRAVITY URINE: 1.03
UROBILINOGEN URINE: 0.2 MG/DL

## 2023-04-11 ENCOUNTER — NON-APPOINTMENT (OUTPATIENT)
Age: 78
End: 2023-04-11

## 2023-04-11 ENCOUNTER — OUTPATIENT (OUTPATIENT)
Dept: OUTPATIENT SERVICES | Facility: HOSPITAL | Age: 78
LOS: 1 days | End: 2023-04-11
Payer: MEDICARE

## 2023-04-11 ENCOUNTER — TRANSCRIPTION ENCOUNTER (OUTPATIENT)
Age: 78
End: 2023-04-11

## 2023-04-11 ENCOUNTER — APPOINTMENT (OUTPATIENT)
Dept: ELECTROPHYSIOLOGY | Facility: CLINIC | Age: 78
End: 2023-04-11
Payer: MEDICARE

## 2023-04-11 VITALS
OXYGEN SATURATION: 95 % | SYSTOLIC BLOOD PRESSURE: 163 MMHG | RESPIRATION RATE: 11 BRPM | HEART RATE: 60 BPM | DIASTOLIC BLOOD PRESSURE: 78 MMHG

## 2023-04-11 VITALS
OXYGEN SATURATION: 99 % | SYSTOLIC BLOOD PRESSURE: 131 MMHG | HEART RATE: 110 BPM | DIASTOLIC BLOOD PRESSURE: 68 MMHG | RESPIRATION RATE: 20 BRPM | TEMPERATURE: 98 F

## 2023-04-11 VITALS — HEART RATE: 57 BPM | SYSTOLIC BLOOD PRESSURE: 122 MMHG | DIASTOLIC BLOOD PRESSURE: 76 MMHG

## 2023-04-11 DIAGNOSIS — I48.91 UNSPECIFIED ATRIAL FIBRILLATION: ICD-10-CM

## 2023-04-11 DIAGNOSIS — Z98.49 CATARACT EXTRACTION STATUS, UNSPECIFIED EYE: Chronic | ICD-10-CM

## 2023-04-11 DIAGNOSIS — Z90.79 ACQUIRED ABSENCE OF OTHER GENITAL ORGAN(S): Chronic | ICD-10-CM

## 2023-04-11 LAB
ANION GAP SERPL CALC-SCNC: 11 MMOL/L — SIGNIFICANT CHANGE UP (ref 5–17)
APTT BLD: 35.9 SEC — HIGH (ref 27.5–35.5)
BUN SERPL-MCNC: 27 MG/DL — HIGH (ref 7–23)
CALCIUM SERPL-MCNC: 10.6 MG/DL — HIGH (ref 8.4–10.5)
CHLORIDE SERPL-SCNC: 109 MMOL/L — HIGH (ref 96–108)
CO2 SERPL-SCNC: 22 MMOL/L — SIGNIFICANT CHANGE UP (ref 22–31)
CREAT SERPL-MCNC: 1.13 MG/DL — SIGNIFICANT CHANGE UP (ref 0.5–1.3)
EGFR: 67 ML/MIN/1.73M2 — SIGNIFICANT CHANGE UP
GLUCOSE SERPL-MCNC: 89 MG/DL — SIGNIFICANT CHANGE UP (ref 70–99)
HCT VFR BLD CALC: 51.9 % — HIGH (ref 39–50)
HGB BLD-MCNC: 17.2 G/DL — HIGH (ref 13–17)
INR BLD: 1.17 RATIO — HIGH (ref 0.88–1.16)
MCHC RBC-ENTMCNC: 31.6 PG — SIGNIFICANT CHANGE UP (ref 27–34)
MCHC RBC-ENTMCNC: 33.1 GM/DL — SIGNIFICANT CHANGE UP (ref 32–36)
MCV RBC AUTO: 95.4 FL — SIGNIFICANT CHANGE UP (ref 80–100)
NRBC # BLD: 0 /100 WBCS — SIGNIFICANT CHANGE UP (ref 0–0)
PLATELET # BLD AUTO: 278 K/UL — SIGNIFICANT CHANGE UP (ref 150–400)
POTASSIUM SERPL-MCNC: 5 MMOL/L — SIGNIFICANT CHANGE UP (ref 3.5–5.3)
POTASSIUM SERPL-SCNC: 5 MMOL/L — SIGNIFICANT CHANGE UP (ref 3.5–5.3)
PROTHROM AB SERPL-ACNC: 13.6 SEC — HIGH (ref 10.5–13.4)
RBC # BLD: 5.44 M/UL — SIGNIFICANT CHANGE UP (ref 4.2–5.8)
RBC # FLD: 14 % — SIGNIFICANT CHANGE UP (ref 10.3–14.5)
SODIUM SERPL-SCNC: 142 MMOL/L — SIGNIFICANT CHANGE UP (ref 135–145)
WBC # BLD: 6.42 K/UL — SIGNIFICANT CHANGE UP (ref 3.8–10.5)
WBC # FLD AUTO: 6.42 K/UL — SIGNIFICANT CHANGE UP (ref 3.8–10.5)

## 2023-04-11 PROCEDURE — 85610 PROTHROMBIN TIME: CPT

## 2023-04-11 PROCEDURE — 85027 COMPLETE CBC AUTOMATED: CPT

## 2023-04-11 PROCEDURE — 93000 ELECTROCARDIOGRAM COMPLETE: CPT

## 2023-04-11 PROCEDURE — 93010 ELECTROCARDIOGRAM REPORT: CPT

## 2023-04-11 PROCEDURE — 99213 OFFICE O/P EST LOW 20 MIN: CPT

## 2023-04-11 PROCEDURE — 93005 ELECTROCARDIOGRAM TRACING: CPT

## 2023-04-11 PROCEDURE — 80048 BASIC METABOLIC PNL TOTAL CA: CPT

## 2023-04-11 PROCEDURE — 92960 CARDIOVERSION ELECTRIC EXT: CPT

## 2023-04-11 PROCEDURE — 85730 THROMBOPLASTIN TIME PARTIAL: CPT

## 2023-04-11 PROCEDURE — 36415 COLL VENOUS BLD VENIPUNCTURE: CPT

## 2023-04-11 RX ORDER — ASPIRIN/CALCIUM CARB/MAGNESIUM 324 MG
0 TABLET ORAL
Refills: 0 | DISCHARGE

## 2023-04-11 RX ORDER — SODIUM CHLORIDE 9 MG/ML
3 INJECTION INTRAMUSCULAR; INTRAVENOUS; SUBCUTANEOUS EVERY 8 HOURS
Refills: 0 | Status: DISCONTINUED | OUTPATIENT
Start: 2023-04-11 | End: 2023-04-26

## 2023-04-11 RX ORDER — DILTIAZEM HCL 120 MG
1 CAPSULE, EXT RELEASE 24 HR ORAL
Qty: 0 | Refills: 0 | DISCHARGE

## 2023-04-11 RX ORDER — PSYLLIUM SEED (WITH DEXTROSE)
0 POWDER (GRAM) ORAL
Qty: 0 | Refills: 0 | DISCHARGE

## 2023-04-11 RX ORDER — APIXABAN 2.5 MG/1
1 TABLET, FILM COATED ORAL
Qty: 60 | Refills: 0
Start: 2023-04-11 | End: 2023-05-10

## 2023-04-11 RX ORDER — APIXABAN 2.5 MG/1
1 TABLET, FILM COATED ORAL
Refills: 0 | DISCHARGE

## 2023-04-11 RX ORDER — ATORVASTATIN CALCIUM 80 MG/1
1 TABLET, FILM COATED ORAL
Qty: 0 | Refills: 0 | DISCHARGE

## 2023-04-11 RX ORDER — METOPROLOL TARTRATE 50 MG
1 TABLET ORAL
Qty: 0 | Refills: 0 | DISCHARGE

## 2023-04-11 RX ORDER — APIXABAN 2.5 MG/1
1 TABLET, FILM COATED ORAL
Qty: 0 | Refills: 0 | DISCHARGE

## 2023-04-11 NOTE — ASU DISCHARGE PLAN (ADULT/PEDIATRIC) - PROVIDER TOKENS
PROVIDER:[TOKEN:[[id number]],FOLLOWUP:[2 weeks]] PROVIDER:[TOKEN:[[id number]],SCHEDULEDAPPT:[04/25/2023],SCHEDULEDAPPTTIME:[11:15 AM]]

## 2023-04-11 NOTE — ASU DISCHARGE PLAN (ADULT/PEDIATRIC) - CARE PROVIDER_API CALL
Jonathan Carpenter)  Cardiac Electrophysiology; Cardiology  [address] [place] [address]  Phone: [phone]  Fax: [phone]  Follow Up Time: 2 weeks   Jonathan Carpenter)  Cardiac Electrophysiology; Cardiology  [address] [place] [address]  Phone: [phone]  Fax: [phone]  Scheduled Appointment: 04/25/2023 11:15 AM

## 2023-04-11 NOTE — ASU PATIENT PROFILE, ADULT - NSICDXPASTMEDICALHX_GEN_ALL_CORE_FT
PAST MEDICAL HISTORY:  History of cardioversion x5 per patient  last episode 7/30/21    HLD (hyperlipidemia)     Kidney stones     Paroxysmal atrial fibrillation     Prostate cancer     Sepsis uro  hospitalized @ [facility] 7/31/21

## 2023-04-11 NOTE — H&P CARDIOLOGY - HISTORY OF PRESENT ILLNESS
77 year old male with PMH of htn, hld, A Fib ( on Eliquis - last dose this morning at 11 am), prostate cancer s/p prostatectomy presents this afternoon for DCCV. Patient reports he felt palpitations this am after climbing stairs and hid Kardia device showed A Fib.  He was seen by Dr Carpenter in the office and referred for cardioversion. Denies CP, SOB or dizziness  77 year old male with PMH of htn, hld, A Fib ( on Eliquis - first dose this morning at 11 am- does not take Eliquis daily but states he takes it PRN when he goes into A Fib), prior DCCV x 6,  prostate cancer s/p prostatectomy, kidney stones s/p lithotripsy  presents this afternoon for DCCV. Patient reports he felt palpitations this am after climbing stairs. His Kardia device showed A Fib. He checks rhythm daily and was in normal rhythm yesterday. He called Dr Carpenter and was seen in the office today where EKG confirmed A Fib.  Denies CP, SOB or dizziness but endorses palpitations     Per Dr Carpenter ok to proceed with DCCV without DON.  77 year old male with PMH of htn, hld, paroxysmal A Fib ( on Eliquis - first dose this morning at 11 am- does not take Eliquis daily as prescribed but states he takes it PRN when he goes into A Fib), prior DCCV x 6,  prostate cancer s/p prostatectomy, kidney stones s/p lithotripsy  presents this afternoon for DCCV. Patient reports he felt palpitations this am after climbing stairs. His Kardia device showed A Fib. He checks rhythm daily and was in normal rhythm yesterday. He immediately called Dr Carpenter and was seen in the office today where EKG confirmed A Fib.  Denies CP, SOB or dizziness but endorses palpitations. States had recent URI w cough - did not take ABX that were prescribed as his symptoms improved.  Lungs CTA today    Per Dr Carpenter ok to proceed with DCCV without DON (as Kardia device shows timing of conversion to A Fib).

## 2023-04-11 NOTE — H&P CARDIOLOGY - NSICDXPASTMEDICALHX_GEN_ALL_CORE_FT
PAST MEDICAL HISTORY:  History of cardioversion x5 per patient  last episode 7/30/21    HLD (hyperlipidemia)     Kidney stones     Paroxysmal atrial fibrillation     Prostate cancer     Sepsis uro  hospitalized @ [facility] 7/31/21

## 2023-04-12 NOTE — CARDIOLOGY SUMMARY
[de-identified] : Today: AF with RVR [de-identified] : Stress echo 3/3/2021, exercise on mariluz protocol for 9 minutes achieving 85 % of MPHR. No evidence \par of ischemic on EKG/Echo. [de-identified] : Ramon choudhary 4/7, 8, 9 ... SR. Today AF [de-identified] : 3/3/2021\par Normal LV systolic function with EF of 55 %.\par Normal RV size and systolic function.\par No significant valvular abnormalities.\par Mild left atrial dilatation

## 2023-04-12 NOTE — PHYSICAL EXAM
Patient Education     Viral Upper Respiratory Illness (Adult)  You have a viral upper respiratory illness (URI), which is another term for the common cold. This illness is contagious during the first few days. It is spread through the air by coughing and sneezing. It may also be spread by direct contact (touching the sick person and then touching your own eyes, nose, or mouth). Frequent handwashing will decrease risk of spread. Most viral illnesses go away within 7 to 10 days with rest and simple home remedies. Sometimes the illness may last for several weeks. Antibiotics will not kill a virus, and they are generally not prescribed for this condition.    Home care  · If symptoms are severe, rest at home for the first 2 to 3 days. When you resume activity, don't let yourself get too tired.  · Avoid being exposed to cigarette smoke (yours or others’).  · You may use acetaminophen or ibuprofen to control pain and fever, unless another medicine was prescribed. (Note: If you have chronic liver or kidney disease, have ever had a stomach ulcer or gastrointestinal bleeding, or are taking blood-thinning medicines, talk with your healthcare provider before using these medicines.) Aspirin should never be given to anyone under 18 years of age who is ill with a viral infection or fever. It may cause severe liver or brain damage.  · Your appetite may be poor, so a light diet is fine. Avoid dehydration by drinking 6 to 8 glasses of fluids per day (water, soft drinks, juices, tea, or soup). Extra fluids will help loosen secretions in the nose and lungs.  · Over-the-counter cold medicines will not shorten the length of time you’re sick, but they may be helpful for the following symptoms: cough, sore throat, and nasal and sinus congestion. (Note: Do not use decongestants if you have high blood pressure.)  Follow-up care  Follow up with your healthcare provider, or as advised.  When to seek medical advice  Call your healthcare  provider right away if any of these occur:  · Cough with lots of colored sputum (mucus)  · Severe headache; face, neck, or ear pain  · Difficulty swallowing due to throat pain  · Fever of 100.4°F (38°C)  Call 911, or get immediate medical care  Call emergency services right away if any of these occur:  · Chest pain, shortness of breath, wheezing, or difficulty breathing  · Coughing up blood  · Inability to swallow due to throat pain  © 9634-3807 [employer]. [address] [place] [address], [place] [address]. All rights reserved. This information is not intended as a substitute for professional medical care. Always follow your healthcare professional's instructions.            [de-identified] : irregularly irregular

## 2023-04-12 NOTE — DISCUSSION/SUMMARY
[FreeTextEntry1] : 77 year old male with symptomatic persistent atrial fibrillation in the setting of hypertension.  Despite being counseled of his increased risk for stroke with his QFLVN0XRLh of 3 (age > 75 and hypertension) he is followed off oral AC.  He is adamant about an impossibility that he would not have asymptomatic AF despite being educated about the data. His MD has advised him of rapid initiation of AC and urgent cardioversion which has been occurring at a frequency of 1x/12 - 24 months.  I reaffirmed the recommendation for continued TE prophylaxis.  He uses and has been compliant with his ALIVE COR device daily to document a rhythm.  This device shows AF 4/11.  He presents today for consideration of DCCV. \par \par We will arrange and he will continue Eliquis at least 4 weeks. post.

## 2023-04-12 NOTE — HISTORY OF PRESENT ILLNESS
[FreeTextEntry1] : 77 year old male with past medical history of hypertension, hyperlipidemia,  atrial fibrillation\par \par He follows his HR/rhythm with daily Kardia strips.  This AM when he awoke he felt unwell.  This was more subtle than prior events.  He ran up the stairs after carrying stuff upstairs.  he felt the onset of palpitations and used his kardia.  He believed that he was in AF.  He took a dose of Eliquis 5 mg this AM and came in for an ECG.  He has no chest pain. No lightheadedness/dizziness. He does acknowledge that he is under  a lot of stress recently.  \par

## 2023-04-17 ENCOUNTER — RX RENEWAL (OUTPATIENT)
Age: 78
End: 2023-04-17

## 2023-04-25 ENCOUNTER — APPOINTMENT (OUTPATIENT)
Dept: ELECTROPHYSIOLOGY | Facility: CLINIC | Age: 78
End: 2023-04-25
Payer: MEDICARE

## 2023-04-25 ENCOUNTER — NON-APPOINTMENT (OUTPATIENT)
Age: 78
End: 2023-04-25

## 2023-04-25 VITALS
HEIGHT: 71 IN | DIASTOLIC BLOOD PRESSURE: 87 MMHG | WEIGHT: 180 LBS | OXYGEN SATURATION: 98 % | SYSTOLIC BLOOD PRESSURE: 147 MMHG | HEART RATE: 60 BPM | BODY MASS INDEX: 25.2 KG/M2

## 2023-04-25 PROCEDURE — 93000 ELECTROCARDIOGRAM COMPLETE: CPT

## 2023-04-25 PROCEDURE — 99213 OFFICE O/P EST LOW 20 MIN: CPT

## 2023-04-25 NOTE — DISCUSSION/SUMMARY
[FreeTextEntry1] : 77 year old male with symptomatic persistent atrial fibrillation in the setting of hypertension.  Despite being counseled of his increased risk for stroke with his EFMUB2TNKi of 3 (age > 75 and hypertension) he is followed off oral AC.  He is adamant about an impossibility that he would not have asymptomatic AF despite being educated about the data. His MD has advised him of rapid initiation of AC and urgent cardioversion which has been occurring at a frequency of 1x/12 - 24 months.  I reaffirmed the recommendation for continued TE prophylaxis.  He uses and has been compliant with his ALIVE COR device daily to document a rhythm.  This device shows AF 4/11.  He is now post cardioversion. We discussed options for therapy including ablation as well as JULISSA occlusion. After our discussion he prefers to continue the current strategy.

## 2023-04-25 NOTE — PHYSICAL EXAM
[Well Developed] : well developed [Well Nourished] : well nourished [Normal Conjunctiva] : normal conjunctiva [Normal Venous Pressure] : normal venous pressure [No Murmur] : no murmur [Clear Lung Fields] : clear lung fields [No Respiratory Distress] : no respiratory distress  [Soft] : abdomen soft [Normal Gait] : normal gait [No Edema] : no edema [No Rash] : no rash [Moves all extremities] : moves all extremities [Alert and Oriented] : alert and oriented [Normal S1, S2] : normal S1, S2

## 2023-04-25 NOTE — CARDIOLOGY SUMMARY
[de-identified] : Today: SR [de-identified] : Ramon choudhary 4/7, 8, 9 ... SR. 4/11/2023 AF [de-identified] : Stress echo 3/3/2021, exercise on mariluz protocol for 9 minutes achieving 85 % of MPHR. No evidence \par of ischemic on EKG/Echo. [de-identified] : 3/3/2021\par Normal LV systolic function with EF of 55 %.\par Normal RV size and systolic function.\par No significant valvular abnormalities.\par Mild left atrial dilatation

## 2023-04-25 NOTE — HISTORY OF PRESENT ILLNESS
[FreeTextEntry1] : 77 year old male with symptomatic persistent atrial fibrillation in the setting of hypertension.  Despite being counseled of his increased risk for stroke with his WNLDA2UUIh of 3 (age > 75 and hypertension) he is followed off oral AC.  He is adamant about an impossibility that he would not have asymptomatic AF despite being educated about the data. His MD has advised him of rapid initiation of AC and urgent cardioversion which has been occurring at a frequency of 1x/12 - 24 months.  I reaffirmed the recommendation for continued TE prophylaxis.  He uses and has been compliant with his ALIVE COR device daily to document a rhythm.  This device shows AF 4/11.  He is now post cardioversion. \par \par No complaints. No chest pain. No shortness of breath. No palpitations.  Overall he feels well. \par

## 2023-05-19 NOTE — H&P PST ADULT - BLOOD AVOIDANCE/RESTRICTIONS, PROFILE
You are being referred to [facility] located at [address], [address] Please call [phone] for more information or to schedule an appointment. [facility] is an outpatient geriatric center servicing mature adults of various ages. This center offers an interdisciplinary approach to comprehensive dementia care. Family members are encouraged to attend along with patients. Services include:  Comprehensive geriatric assessment  Case management  Caregiver/family education and support     You are referred to [address], an outpatient geriatric and adult assessment and mental health intervention. They have a team of professionals perform comprehensive geriatric assessments. They have extensive experience working with older adults and significant hands-on experience with individuals suffering from different forms of cognitive impairment. They are located at [address] [place] [address] [address]. To schedule an appointment call [phone]. Senior Services:    Yaya Werner  Location: [address] [facility], [place], [facility]  Phone: [phone] ([email or web address])  Other: Your source for information on services for seniors, age 61 and over, who live in [place]. [facility]  Location: [address]   Phone: [phone]  Other: Your source for information on services for seniors, age 61 and over, who live in [place]. [facility]  Location: [address], [address], [place], [address]  Phone: [phone]  Other: Provides services to older adults to help them live independently, stay active and enjoy life. We serve all of [address], as well as some surrounding areas.     [address]
none

## 2023-07-10 ENCOUNTER — OFFICE (OUTPATIENT)
Dept: URBAN - METROPOLITAN AREA CLINIC 12 | Facility: CLINIC | Age: 78
Setting detail: OPHTHALMOLOGY
End: 2023-07-10
Payer: MEDICARE

## 2023-07-10 DIAGNOSIS — H02.534: ICD-10-CM

## 2023-07-10 DIAGNOSIS — H01.001: ICD-10-CM

## 2023-07-10 DIAGNOSIS — H35.411: ICD-10-CM

## 2023-07-10 DIAGNOSIS — Z96.1: ICD-10-CM

## 2023-07-10 DIAGNOSIS — H02.531: ICD-10-CM

## 2023-07-10 DIAGNOSIS — H16.223: ICD-10-CM

## 2023-07-10 DIAGNOSIS — H01.004: ICD-10-CM

## 2023-07-10 PROCEDURE — 92014 COMPRE OPH EXAM EST PT 1/>: CPT | Performed by: OPHTHALMOLOGY

## 2023-07-10 PROCEDURE — 92250 FUNDUS PHOTOGRAPHY W/I&R: CPT | Performed by: OPHTHALMOLOGY

## 2023-07-10 ASSESSMENT — SUPERFICIAL PUNCTATE KERATITIS (SPK)
OD_SPK: 1+
OS_SPK: 1+

## 2023-07-10 ASSESSMENT — KERATOMETRY
OD_K1POWER_DIOPTERS: 42.75
OD_AXISANGLE_DEGREES: 85
OS_K2POWER_DIOPTERS: 43.25
OD_K2POWER_DIOPTERS: 43.50
OS_K1POWER_DIOPTERS: 42.50
OS_AXISANGLE_DEGREES: 62

## 2023-07-10 ASSESSMENT — SPHEQUIV_DERIVED
OD_SPHEQUIV: -0.375
OD_SPHEQUIV: -0.5
OS_SPHEQUIV: 0.375
OS_SPHEQUIV: 0.25
OD_SPHEQUIV: -0.5

## 2023-07-10 ASSESSMENT — REFRACTION_CURRENTRX
OD_VPRISM_DIRECTION: SV
OS_OVR_VA: 20/
OD_OVR_VA: 20/
OD_SPHERE: +2.00
OS_SPHERE: +2.00
OS_VPRISM_DIRECTION: SV

## 2023-07-10 ASSESSMENT — TONOMETRY
OD_IOP_MMHG: 10
OS_IOP_MMHG: 10

## 2023-07-10 ASSESSMENT — REFRACTION_MANIFEST
OD_AXIS: 005
OS_CYLINDER: -0.50
OD_VA1: 20/20-1
OD_AXIS: 020
OD_ADD: +2.25
OD_CYLINDER: -0.50
OS_VA1: 20/20-2
OS_CYLINDER: SPH
OS_SPHERE: PLANO
OS_AXIS: 151
OD_SPHERE: -0.25
OD_SPHERE: -0.25
OD_CYLINDER: -0.25
OS_ADD: +2.25
OS_SPHERE: +0.50

## 2023-07-10 ASSESSMENT — AXIALLENGTH_DERIVED
OS_AL: 23.6757
OD_AL: 23.8792
OS_AL: 23.7248
OD_AL: 23.9292
OD_AL: 23.9292

## 2023-07-10 ASSESSMENT — REFRACTION_AUTOREFRACTION
OD_SPHERE: -0.25
OS_AXIS: 140
OS_CYLINDER: -0.75
OD_AXIS: 21
OD_CYLINDER: -0.50
OS_SPHERE: +0.75

## 2023-07-10 ASSESSMENT — LID EXAM ASSESSMENTS
OD_BLEPHARITIS: RUL 1+
OS_LID_LAXITY: 1+
OD_LID_LAXITY: 1+
OS_BLEPHARITIS: LUL 1+

## 2023-07-10 ASSESSMENT — CONFRONTATIONAL VISUAL FIELD TEST (CVF)
OS_FINDINGS: FULL
OD_FINDINGS: FULL

## 2023-07-10 ASSESSMENT — VISUAL ACUITY
OD_BCVA: 20/20-2
OS_BCVA: 20/20-2

## 2023-07-25 ENCOUNTER — APPOINTMENT (OUTPATIENT)
Dept: ORTHOPEDIC SURGERY | Facility: CLINIC | Age: 78
End: 2023-07-25
Payer: MEDICARE

## 2023-07-25 VITALS — HEIGHT: 71 IN | WEIGHT: 178 LBS | BODY MASS INDEX: 24.92 KG/M2

## 2023-07-25 DIAGNOSIS — M47.816 SPONDYLOSIS W/OUT MYELOPATHY OR RADICULOPATHY, LUMBAR REGION: ICD-10-CM

## 2023-07-25 DIAGNOSIS — M54.41 LUMBAGO WITH SCIATICA, RIGHT SIDE: ICD-10-CM

## 2023-07-25 PROCEDURE — 99213 OFFICE O/P EST LOW 20 MIN: CPT

## 2023-07-25 NOTE — DISCUSSION/SUMMARY
[Medication Risks Reviewed] : Medication risks reviewed [de-identified] : He will take 2 Aleve twice a day for 1 week and if doing well will then lower the dose to 1 Aleve twice a day for 5 days after all of his symptoms have fully resolved.  He will call if there are problems with the medication or worsening of his symptoms and I will see him for follow-up in 3 to 4 weeks on a as needed basis.

## 2023-07-25 NOTE — PHYSICAL EXAM
[de-identified] : He is comfortable sitting today and straight leg raising is negative to 90 degrees bilaterally.

## 2023-07-25 NOTE — HISTORY OF PRESENT ILLNESS
[de-identified] : 6 weeks ago he had a recurrence of right-sided lower back pain which radiation to the right buttock and posterior thigh but not below the knee.  Both the back and the right buttock and leg pain were graded as a 6.  He thinks perhaps it was related to doing yard work.  He has been taking his brother-in-law's Naprosyn 500 mg twice a day.  The right buttock and thigh pain is fully resolved and the right-sided lower back pain initially graded as a 6 is now only a 1.

## 2023-08-09 NOTE — DISCHARGE NOTE NURSING/CASE MANAGEMENT/SOCIAL WORK - NSDCPEELIQUISREACT_GEN_ALL_CORE
86 Apixaban/Eliquis increases your risk for bleeding. Notify your doctor if you experience any of the following side effects: bleeding, coughing or vomiting blood, red or black stool, unexpected pain or swelling, itching or hives, chest pain, chest tightness, trouble breathing, changes in how much or how often you urinate, red or pink urine, numbness or tingling in your feet, or unusual muscle weakness. When Apixaban/Eliquis is taken with other medicines, they can affect how it works. Taking other medications such as aspirin, blood thinners, nonsteroidal anti-inflammatories, and medications that treat depression can increase your risk of bleeding. It is very important to tell your health care provider about all of the other medicines, including over-the-counter medications, herbs, and vitamins you are taking. DO NOT start, stop, or change the dosage of any medicine, including over-the-counter medicines, vitamins, and herbal products without your doctor’s approval. Any products containing aspirin or are nonsteroidal anti-inflammatories lessen the blood’s ability to form clots and add to the effect of Apixaban/Eliquis. Never take aspirin or medicines that contain aspirin without speaking to your doctor.

## 2023-09-28 ENCOUNTER — APPOINTMENT (OUTPATIENT)
Dept: FAMILY MEDICINE | Facility: CLINIC | Age: 78
End: 2023-09-28
Payer: MEDICARE

## 2023-09-28 PROCEDURE — 90662 IIV NO PRSV INCREASED AG IM: CPT

## 2023-09-28 PROCEDURE — G0008: CPT

## 2023-10-01 PROBLEM — I63.81 LACUNAR INFARCTION: Status: ACTIVE | Noted: 2022-04-28

## 2023-10-26 ENCOUNTER — APPOINTMENT (OUTPATIENT)
Dept: ORTHOPEDIC SURGERY | Facility: CLINIC | Age: 78
End: 2023-10-26
Payer: MEDICARE

## 2023-10-26 VITALS — WEIGHT: 178 LBS | HEIGHT: 71 IN | BODY MASS INDEX: 24.92 KG/M2

## 2023-10-26 PROCEDURE — 73564 X-RAY EXAM KNEE 4 OR MORE: CPT | Mod: RT

## 2023-10-26 PROCEDURE — 99204 OFFICE O/P NEW MOD 45 MIN: CPT

## 2023-11-07 ENCOUNTER — APPOINTMENT (OUTPATIENT)
Dept: FAMILY MEDICINE | Facility: CLINIC | Age: 78
End: 2023-11-07
Payer: MEDICARE

## 2023-11-07 ENCOUNTER — LABORATORY RESULT (OUTPATIENT)
Age: 78
End: 2023-11-07

## 2023-11-07 VITALS
BODY MASS INDEX: 35.12 KG/M2 | HEART RATE: 66 BPM | TEMPERATURE: 97.4 F | SYSTOLIC BLOOD PRESSURE: 120 MMHG | HEIGHT: 61 IN | DIASTOLIC BLOOD PRESSURE: 80 MMHG | WEIGHT: 186 LBS | OXYGEN SATURATION: 98 %

## 2023-11-07 VITALS — HEART RATE: 72 BPM | SYSTOLIC BLOOD PRESSURE: 130 MMHG | RESPIRATION RATE: 16 BRPM | DIASTOLIC BLOOD PRESSURE: 80 MMHG

## 2023-11-07 PROCEDURE — G0009: CPT

## 2023-11-07 PROCEDURE — 90732 PPSV23 VACC 2 YRS+ SUBQ/IM: CPT

## 2023-11-07 PROCEDURE — G0444 DEPRESSION SCREEN ANNUAL: CPT | Mod: 59

## 2023-11-07 PROCEDURE — 99213 OFFICE O/P EST LOW 20 MIN: CPT | Mod: 25

## 2023-11-07 PROCEDURE — G0439: CPT

## 2023-11-07 PROCEDURE — 99497 ADVNCD CARE PLAN 30 MIN: CPT

## 2023-11-07 RX ORDER — BENZONATATE 200 MG/1
200 CAPSULE ORAL 3 TIMES DAILY
Qty: 30 | Refills: 1 | Status: COMPLETED | COMMUNITY
Start: 2023-04-06 | End: 2023-11-07

## 2023-11-07 RX ORDER — AMOXICILLIN 875 MG/1
875 TABLET, FILM COATED ORAL
Qty: 20 | Refills: 0 | Status: COMPLETED | COMMUNITY
Start: 2023-04-06 | End: 2023-11-07

## 2023-11-07 RX ORDER — NAPROXEN 500 MG/1
500 TABLET ORAL
Qty: 60 | Refills: 0 | Status: ACTIVE | COMMUNITY
Start: 2021-09-10 | End: 1900-01-01

## 2023-11-08 LAB
ALBUMIN SERPL ELPH-MCNC: 4.6 G/DL
ALP BLD-CCNC: 81 U/L
ALT SERPL-CCNC: 25 U/L
ANION GAP SERPL CALC-SCNC: 11 MMOL/L
AST SERPL-CCNC: 23 U/L
BASOPHILS # BLD AUTO: 0.18 K/UL
BASOPHILS NFR BLD AUTO: 2.6 %
BILIRUB SERPL-MCNC: 0.4 MG/DL
BUN SERPL-MCNC: 30 MG/DL
CALCIUM SERPL-MCNC: 11.1 MG/DL
CHLORIDE SERPL-SCNC: 105 MMOL/L
CHOLEST SERPL-MCNC: 157 MG/DL
CO2 SERPL-SCNC: 25 MMOL/L
CREAT SERPL-MCNC: 1.16 MG/DL
EGFR: 64 ML/MIN/1.73M2
EOSINOPHIL # BLD AUTO: 0.24 K/UL
EOSINOPHIL NFR BLD AUTO: 3.4 %
ESTIMATED AVERAGE GLUCOSE: 123 MG/DL
GLUCOSE SERPL-MCNC: 84 MG/DL
HBA1C MFR BLD HPLC: 5.9 %
HCT VFR BLD CALC: 49 %
HDLC SERPL-MCNC: 50 MG/DL
HGB BLD-MCNC: 16.4 G/DL
LDLC SERPL CALC-MCNC: 87 MG/DL
LYMPHOCYTES # BLD AUTO: 1.04 K/UL
LYMPHOCYTES NFR BLD AUTO: 14.7 %
MAN DIFF?: NORMAL
MCHC RBC-ENTMCNC: 32 PG
MCHC RBC-ENTMCNC: 33.5 GM/DL
MCV RBC AUTO: 95.7 FL
MONOCYTES # BLD AUTO: 0.67 K/UL
MONOCYTES NFR BLD AUTO: 9.5 %
NEUTROPHILS # BLD AUTO: 4.92 K/UL
NEUTROPHILS NFR BLD AUTO: 69.8 %
NONHDLC SERPL-MCNC: 107 MG/DL
PLATELET # BLD AUTO: 304 K/UL
POTASSIUM SERPL-SCNC: 4.8 MMOL/L
PROT SERPL-MCNC: 7.4 G/DL
RBC # BLD: 5.12 M/UL
RBC # FLD: 13.9 %
SODIUM SERPL-SCNC: 141 MMOL/L
T4 SERPL-MCNC: 7 UG/DL
TRIGL SERPL-MCNC: 113 MG/DL
TSH SERPL-ACNC: 1.45 UIU/ML
URATE SERPL-MCNC: 4.7 MG/DL
WBC # FLD AUTO: 7.05 K/UL

## 2023-11-09 LAB
APPEARANCE: CLEAR
BACTERIA: NEGATIVE /HPF
BILIRUBIN URINE: NEGATIVE
BLOOD URINE: NEGATIVE
CALCIUM OXALATE CRYSTALS: PRESENT
CAST: 0 /LPF
COLOR: YELLOW
CREAT SPEC-SCNC: 241 MG/DL
EPITHELIAL CELLS: 0 /HPF
GLUCOSE QUALITATIVE U: NEGATIVE MG/DL
KETONES URINE: NEGATIVE MG/DL
LEUKOCYTE ESTERASE URINE: NEGATIVE
MICROALBUMIN 24H UR DL<=1MG/L-MCNC: 4.4 MG/DL
MICROALBUMIN/CREAT 24H UR-RTO: 18 MG/G
MICROSCOPIC-UA: NORMAL
NITRITE URINE: NEGATIVE
PH URINE: 6
PROTEIN URINE: NORMAL MG/DL
RED BLOOD CELLS URINE: 1 /HPF
REVIEW: NORMAL
SPECIFIC GRAVITY URINE: >1.03
UROBILINOGEN URINE: 1 MG/DL
WHITE BLOOD CELLS URINE: 0 /HPF

## 2023-11-16 ENCOUNTER — APPOINTMENT (OUTPATIENT)
Dept: MRI IMAGING | Facility: CLINIC | Age: 78
End: 2023-11-16
Payer: MEDICARE

## 2023-11-16 ENCOUNTER — APPOINTMENT (OUTPATIENT)
Dept: ORTHOPEDIC SURGERY | Facility: CLINIC | Age: 78
End: 2023-11-16
Payer: MEDICARE

## 2023-11-16 VITALS — BODY MASS INDEX: 35.12 KG/M2 | HEIGHT: 61 IN | WEIGHT: 186 LBS

## 2023-11-16 PROCEDURE — 99214 OFFICE O/P EST MOD 30 MIN: CPT

## 2023-11-16 PROCEDURE — 73721 MRI JNT OF LWR EXTRE W/O DYE: CPT | Mod: RT,MH

## 2023-11-20 ENCOUNTER — APPOINTMENT (OUTPATIENT)
Dept: ORTHOPEDIC SURGERY | Facility: CLINIC | Age: 78
End: 2023-11-20
Payer: MEDICARE

## 2023-11-20 VITALS — WEIGHT: 186 LBS | BODY MASS INDEX: 35.12 KG/M2 | HEIGHT: 61 IN

## 2023-11-20 PROCEDURE — 99214 OFFICE O/P EST MOD 30 MIN: CPT | Mod: 25

## 2023-11-20 PROCEDURE — 20610 DRAIN/INJ JOINT/BURSA W/O US: CPT | Mod: RT

## 2023-11-20 PROCEDURE — J3490M: CUSTOM | Mod: NC

## 2023-11-21 NOTE — ASU DISCHARGE PLAN (ADULT/PEDIATRIC) - ASU DISCHARGE DATE/TIME
Primary Insurance:[employer]- adult dep    ID#:[id number]      Insurance Phone Number:[phone]    Procedure:26126- echo stress    DX: I10, r93.1    Date of Procedure:12/29    Authorization#:  No auth needed per Roderick MUIR on 11/21 at 11:41 am    Call Ref# [id number]     Spoke with: David MUIR    Auth completed by: Bruna   02-Sep-2021 10:20

## 2023-12-21 ENCOUNTER — APPOINTMENT (OUTPATIENT)
Dept: ORTHOPEDIC SURGERY | Facility: CLINIC | Age: 78
End: 2023-12-21
Payer: MEDICARE

## 2023-12-21 VITALS — BODY MASS INDEX: 26.04 KG/M2 | WEIGHT: 186 LBS | HEIGHT: 71 IN

## 2023-12-21 PROCEDURE — 99213 OFFICE O/P EST LOW 20 MIN: CPT

## 2023-12-22 NOTE — DATA REVIEWED
[MRI] : MRI [Right] : of the right [Knee] : knee [Report was reviewed and noted in the chart] : The report was reviewed and noted in the chart [I independently reviewed and interpreted images and report] : I independently reviewed and interpreted images and report [I reviewed the films/CD and additionally noted] : I reviewed the films/CD and additionally noted [FreeTextEntry1] : degenerative medial and lateral meniscal tears degeneration consistent with osteoarthritis

## 2023-12-22 NOTE — IMAGING
[de-identified] : The patient is a well appearing 78 year  old male of their stated age. Patient ambulates with a normal gait. Negative straight leg raise bilateral   Effected Knee: RIGHT                         ROM:  0-130 degrees WITH CLICK  Lachman: Negative Pivot Shift: Negative Anterior Drawer: Negative Posterior Drawer / Sag: Negative Varus Stress 0 degrees: Stable Varus Stress 30 degrees: Stable Valgus Stress 0 degrees: Stable Valgus Stress 30 degrees: Stable Medial Eric: POSITIVE  Lateral Eric: Negative Patella Glide: 2+ Patella Apprehension: Negative Patella Grind: POSITIVE    Palpation: Medial Joint Line: TENDER  Lateral Joint Line: Nontender Medial Collateral Ligament: Nontender Lateral Collateral Ligament/PLC: Nontender Distal Femur: Nontender Proximal Tibia: Nontender Tibial Tubercle: Nontender Distal Pole Patella: Nontender Quadriceps Tendon: Nontender &  Intact Patella Tendon: Nontender &  Intact Medial Femoral Condyle: Nontender Medial Distal Hamstring/PES: Nontender Lateral Distal Hamstring: Nontender Vastus Medialis Oblique: Nontender  Iliotibial Band: Nontender & Intact Medial Patellofemoral Ligament: Nontender Adductor: Nontender Proximal GSC-Plantaris: Nontender Calf: Supple & Nontender   Inspection: Deformity: No Erythema: No Ecchymosis: No Abrasions: No Effusion: No Prepatella Bursitis: No Neurologic Exam: Sensation L4-S1: Grossly Intact Motor Exam: Quadriceps: 5 out of 5 Hamstrings: 5 out of 5 EHL: 5 out of 5 FHL: 5 out of 5 TA: 5 out of 5 GS: 5 out of 5 Circulatory/Pulses: Dorsalis Pedis: 2+ Posterior Tibialis: 2+ Additional Pertinent Findings: None   Contralateral Knee:                             ROM: 0-145 degrees Other Pertinent Findings: None   Assessment: The patient is a 78 year old male with right knee pain and radiographic and physical exam findings consistent with arthritis.   The patient's condition is acute Documents/Results Reviewed Today: None   Tests/Studies Independently Interpreted Today: None  Pertinent findings include: 0-130 with click, MJLT, +patellofemoral grind, +medial eric Confounding medical conditions/concerns: None   Plan: The patient reports gradual, interval improvement in pain and mechanical symptoms secondary to recent right knee 9/2 CSI on 11/20/2023. Patient will continue physical therapy, HEP, and stretching. Advised patient to continue use of Reparel sleeve. Continue use of previously prescribed Naprosyn for pain management - use as directed. The patient will follow up on a PRN basis unless new symptoms arise. Modify activity as discussed. Tests Ordered: None  Prescription Medications Ordered: Continue PRN use of outside prescribed Naproxen   Braces/DME Ordered: None  Activity/Work/Sports Status: As tolerated  Additional Instructions: None Follow-Up: PRN   The patient's current medication management of their orthopedic diagnosis was reviewed today: (1) The patient will continue with the PRN use of their prescribed anti-inflammatory. (2) There is a moderate risk of morbidity with further treatment, especially from use of prescription strength medications and possible side effects of these medications which include upset stomach with oral medications, skin reactions to topical medications and cardiac/renal issues with long term use. (3) I recommended that the patient follow-up with their medical physician to discuss any significant specific potential issues with long term medication use such as interactions with current medications or with exacerbation of underlying medical comorbidities. (4) The benefits and risks associated with use of injectable, oral or topical, prescription and over the counter anti-inflammatory medications were discussed with the patient. The patient voiced understanding of the risks including but not limited to bleeding, stroke, kidney dysfunction, heart disease, and were referred to the black box warning label for further information.   IGraciela attest that this documentation has been prepared under the direction and in the presence of Provider Dr. Giovanny Sterling.   The documentation recorded by the scribe accurately reflects the service Mary VALDEZ PA-C, personally performed and the decisions made by me. The patient was seen by me under the direct supervision of Dr. Giovanny Sterling.

## 2023-12-22 NOTE — HISTORY OF PRESENT ILLNESS
[de-identified] : The patient is a 78 year  old right hand dominant male who presents today complaining of right knee pain.   Date of Injury/Onset: 9/20/23 Pain:    At Rest: 1/10  With Activity:  2/10  Mechanism of injury: Pt states he slipped and mis stepped with his right knee and felt pain the following day This is not a Work Related Injury being treated under Worker's Compensation. This is not an athletic injury occurring associated with an interscholastic or organized sports team. Quality of symptoms: pain, weakness, tightness, limited ROM Improves with: rest, NSAIDs Worse with: overuse Treatment/Imaging/Studies Since Last Visit: PT, CSI 	Reports Available For Review Today: MRI right knee Out of work/sport: not working School/Sport/Position/Occupation: retired Changes since last visit: patient reports pain after PT and has only gone twice.  He feels like he got minimal relief from the CSI Additional Information: None

## 2024-01-18 ENCOUNTER — APPOINTMENT (OUTPATIENT)
Dept: ORTHOPEDIC SURGERY | Facility: CLINIC | Age: 79
End: 2024-01-18
Payer: MEDICARE

## 2024-01-18 VITALS — WEIGHT: 186 LBS | HEIGHT: 71 IN | BODY MASS INDEX: 26.04 KG/M2

## 2024-01-18 DIAGNOSIS — M25.561 PAIN IN RIGHT KNEE: ICD-10-CM

## 2024-01-18 DIAGNOSIS — M17.11 UNILATERAL PRIMARY OSTEOARTHRITIS, RIGHT KNEE: ICD-10-CM

## 2024-01-18 PROCEDURE — 99214 OFFICE O/P EST MOD 30 MIN: CPT

## 2024-01-18 NOTE — HISTORY OF PRESENT ILLNESS
[de-identified] : The patient is a 78 year  old right hand dominant male who presents today complaining of right knee pain.   Date of Injury/Onset: 9/20/23 Pain:    At Rest: 0/10  With Activity:  4/10  Mechanism of injury: Pt states he slipped and mis stepped with his right knee and felt pain the following day This is not a Work Related Injury being treated under Worker's Compensation. This is not an athletic injury occurring associated with an interscholastic or organized sports team. Quality of symptoms: pain, weakness, tightness, limited ROM, instability Improves with: rest, NSAIDs Worse with: overuse Treatment/Imaging/Studies Since Last Visit: PT, [place] with Dr. Frankenberger 	Reports Available For Review Today: none Out of work/sport: not working School/Sport/Position/Occupation: retired Changes since last visit: pt reports no improvement. He saw Dr. Frankenberger at the suggestion of a friend and had gel injections, and feels as though its its made it worse. He was walking up the stairs a few days later and his knee buckled and caused extreme pain. He has only had 1 of the 3 injections. He couldn't get in to see her soon enough for the pain and scheduled to see Dr. Sterling in the meantime. Additional Information: None

## 2024-01-25 ENCOUNTER — APPOINTMENT (OUTPATIENT)
Dept: ULTRASOUND IMAGING | Facility: CLINIC | Age: 79
End: 2024-01-25
Payer: MEDICARE

## 2024-01-25 ENCOUNTER — OUTPATIENT (OUTPATIENT)
Dept: OUTPATIENT SERVICES | Facility: HOSPITAL | Age: 79
LOS: 1 days | End: 2024-01-25
Payer: MEDICARE

## 2024-01-25 DIAGNOSIS — Z90.79 ACQUIRED ABSENCE OF OTHER GENITAL ORGAN(S): Chronic | ICD-10-CM

## 2024-01-25 DIAGNOSIS — Z00.8 ENCOUNTER FOR OTHER GENERAL EXAMINATION: ICD-10-CM

## 2024-01-25 DIAGNOSIS — Z98.49 CATARACT EXTRACTION STATUS, UNSPECIFIED EYE: Chronic | ICD-10-CM

## 2024-01-25 PROCEDURE — 76770 US EXAM ABDO BACK WALL COMP: CPT | Mod: 26

## 2024-01-25 PROCEDURE — 76770 US EXAM ABDO BACK WALL COMP: CPT

## 2024-02-22 ENCOUNTER — APPOINTMENT (OUTPATIENT)
Dept: FAMILY MEDICINE | Facility: CLINIC | Age: 79
End: 2024-02-22
Payer: MEDICARE

## 2024-02-22 VITALS — RESPIRATION RATE: 16 BRPM | DIASTOLIC BLOOD PRESSURE: 80 MMHG | SYSTOLIC BLOOD PRESSURE: 130 MMHG | HEART RATE: 76 BPM

## 2024-02-22 VITALS
OXYGEN SATURATION: 98 % | TEMPERATURE: 97 F | SYSTOLIC BLOOD PRESSURE: 116 MMHG | DIASTOLIC BLOOD PRESSURE: 76 MMHG | WEIGHT: 187 LBS | BODY MASS INDEX: 26.18 KG/M2 | HEART RATE: 82 BPM | HEIGHT: 71 IN

## 2024-02-22 DIAGNOSIS — N20.0 CALCULUS OF KIDNEY: ICD-10-CM

## 2024-02-22 DIAGNOSIS — E78.00 PURE HYPERCHOLESTEROLEMIA, UNSPECIFIED: ICD-10-CM

## 2024-02-22 DIAGNOSIS — Z01.818 ENCOUNTER FOR OTHER PREPROCEDURAL EXAMINATION: ICD-10-CM

## 2024-02-22 PROCEDURE — 99214 OFFICE O/P EST MOD 30 MIN: CPT

## 2024-02-22 NOTE — REVIEW OF SYSTEMS
[Fever] : no fever [Chills] : no chills [Earache] : no earache [Sore Throat] : no sore throat [Chest Pain] : no chest pain [Palpitations] : no palpitations [Shortness Of Breath] : no shortness of breath [Wheezing] : no wheezing [Cough] : no cough [Abdominal Pain] : no abdominal pain [Diarrhea] : no diarrhea [Dysuria] : no dysuria [Skin Rash] : no skin rash [Headache] : no headache [Dizziness] : no dizziness [Easy Bleeding] : no easy bleeding [Easy Bruising] : no easy bruising

## 2024-02-22 NOTE — HISTORY OF PRESENT ILLNESS
[Aortic Stenosis] : no aortic stenosis [Atrial Fibrillation] : atrial fibrillation [Coronary Artery Disease] : no coronary artery disease [Recent Myocardial Infarction] : no recent myocardial infarction [Implantable Device/Pacemaker] : no implantable device/pacemaker [No Pertinent Pulmonary History] : no history of asthma, COPD, sleep apnea, or smoking [No Adverse Anesthesia Reaction] : no adverse anesthesia reaction in self or family member [Chronic Anticoagulation] : no chronic anticoagulation [Chronic Kidney Disease] : no chronic kidney disease [Diabetes] : no diabetes [(Patient denies any chest pain, claudication, dyspnea on exertion, orthopnea, palpitations or syncope)] : Patient denies any chest pain, claudication, dyspnea on exertion, orthopnea, palpitations or syncope [Moderate (4-6 METs)] : Moderate (4-6 METs) [FreeTextEntry1] : lithotripsy  [FreeTextEntry2] : 2/27/24 [FreeTextEntry3] : Dr. Gregg  [FreeTextEntry4] : pt is a 78 yr old male here for [facility] for lithotripsy.  Pt active medical problems  include  hld af htn

## 2024-02-22 NOTE — ASSESSMENT
[Patient Optimized for Surgery] : Patient optimized for surgery [No Further Testing Recommended] : no further testing recommended [Continue medications as is] : Continue current medications [FreeTextEntry4] : acceptable medical condition for surgery hld  continue atorvastatin af nsr takes eliquis prn

## 2024-02-23 DIAGNOSIS — E21.3 HYPERPARATHYROIDISM, UNSPECIFIED: ICD-10-CM

## 2024-02-23 LAB
ALBUMIN SERPL ELPH-MCNC: 4.6 G/DL
ALP BLD-CCNC: 79 U/L
ALT SERPL-CCNC: 33 U/L
ANION GAP SERPL CALC-SCNC: 10 MMOL/L
APPEARANCE: CLEAR
AST SERPL-CCNC: 26 U/L
BACTERIA: NEGATIVE /HPF
BASOPHILS # BLD AUTO: 0.06 K/UL
BASOPHILS NFR BLD AUTO: 1 %
BILIRUB SERPL-MCNC: 0.5 MG/DL
BILIRUBIN URINE: NEGATIVE
BLOOD URINE: NEGATIVE
BUN SERPL-MCNC: 23 MG/DL
CALCIUM SERPL-MCNC: 10.8 MG/DL
CALCIUM SERPL-MCNC: 10.8 MG/DL
CAST: 1 /LPF
CHLORIDE SERPL-SCNC: 103 MMOL/L
CHOLEST SERPL-MCNC: 153 MG/DL
CO2 SERPL-SCNC: 27 MMOL/L
COLOR: YELLOW
CREAT SERPL-MCNC: 1.13 MG/DL
EGFR: 66 ML/MIN/1.73M2
EOSINOPHIL # BLD AUTO: 0.18 K/UL
EOSINOPHIL NFR BLD AUTO: 2.9 %
EPITHELIAL CELLS: 0 /HPF
GLUCOSE QUALITATIVE U: NEGATIVE MG/DL
GLUCOSE SERPL-MCNC: 87 MG/DL
HCT VFR BLD CALC: 50 %
HDLC SERPL-MCNC: 43 MG/DL
HGB BLD-MCNC: 16.6 G/DL
IMM GRANULOCYTES NFR BLD AUTO: 0.3 %
KETONES URINE: NEGATIVE MG/DL
LDLC SERPL CALC-MCNC: 81 MG/DL
LEUKOCYTE ESTERASE URINE: NEGATIVE
LYMPHOCYTES # BLD AUTO: 1.05 K/UL
LYMPHOCYTES NFR BLD AUTO: 17.2 %
MAN DIFF?: NORMAL
MCHC RBC-ENTMCNC: 31.2 PG
MCHC RBC-ENTMCNC: 33.2 GM/DL
MCV RBC AUTO: 94 FL
MICROSCOPIC-UA: NORMAL
MONOCYTES # BLD AUTO: 0.59 K/UL
MONOCYTES NFR BLD AUTO: 9.6 %
NEUTROPHILS # BLD AUTO: 4.22 K/UL
NEUTROPHILS NFR BLD AUTO: 69 %
NITRITE URINE: NEGATIVE
NONHDLC SERPL-MCNC: 110 MG/DL
PARATHYROID HORMONE INTACT: 55 PG/ML
PH URINE: 6.5
PLATELET # BLD AUTO: 289 K/UL
POTASSIUM SERPL-SCNC: 4.7 MMOL/L
PROT SERPL-MCNC: 7.3 G/DL
PROTEIN URINE: 30 MG/DL
RBC # BLD: 5.32 M/UL
RBC # FLD: 14.3 %
RED BLOOD CELLS URINE: 1 /HPF
SODIUM SERPL-SCNC: 140 MMOL/L
SPECIFIC GRAVITY URINE: 1.02
T4 SERPL-MCNC: 8.2 UG/DL
TRIGL SERPL-MCNC: 167 MG/DL
TSH SERPL-ACNC: 1.19 UIU/ML
URATE SERPL-MCNC: 5.2 MG/DL
UROBILINOGEN URINE: 1 MG/DL
WBC # FLD AUTO: 6.12 K/UL
WHITE BLOOD CELLS URINE: 0 /HPF

## 2024-03-08 ENCOUNTER — APPOINTMENT (OUTPATIENT)
Dept: ULTRASOUND IMAGING | Facility: CLINIC | Age: 79
End: 2024-03-08
Payer: MEDICARE

## 2024-03-08 ENCOUNTER — OUTPATIENT (OUTPATIENT)
Dept: OUTPATIENT SERVICES | Facility: HOSPITAL | Age: 79
LOS: 1 days | End: 2024-03-08
Payer: MEDICARE

## 2024-03-08 DIAGNOSIS — Z90.79 ACQUIRED ABSENCE OF OTHER GENITAL ORGAN(S): Chronic | ICD-10-CM

## 2024-03-08 DIAGNOSIS — Z00.8 ENCOUNTER FOR OTHER GENERAL EXAMINATION: ICD-10-CM

## 2024-03-08 DIAGNOSIS — Z98.49 CATARACT EXTRACTION STATUS, UNSPECIFIED EYE: Chronic | ICD-10-CM

## 2024-03-08 PROCEDURE — 76770 US EXAM ABDO BACK WALL COMP: CPT

## 2024-03-08 PROCEDURE — 76770 US EXAM ABDO BACK WALL COMP: CPT | Mod: 26

## 2024-03-12 ENCOUNTER — APPOINTMENT (OUTPATIENT)
Dept: NUCLEAR MEDICINE | Facility: CLINIC | Age: 79
End: 2024-03-12

## 2024-04-30 ENCOUNTER — APPOINTMENT (OUTPATIENT)
Dept: ELECTROPHYSIOLOGY | Facility: CLINIC | Age: 79
End: 2024-04-30
Payer: MEDICARE

## 2024-04-30 ENCOUNTER — NON-APPOINTMENT (OUTPATIENT)
Age: 79
End: 2024-04-30

## 2024-04-30 VITALS
DIASTOLIC BLOOD PRESSURE: 83 MMHG | HEIGHT: 71 IN | SYSTOLIC BLOOD PRESSURE: 155 MMHG | WEIGHT: 185 LBS | OXYGEN SATURATION: 99 % | HEART RATE: 62 BPM | BODY MASS INDEX: 25.9 KG/M2

## 2024-04-30 DIAGNOSIS — I48.91 UNSPECIFIED ATRIAL FIBRILLATION: ICD-10-CM

## 2024-04-30 PROCEDURE — 99215 OFFICE O/P EST HI 40 MIN: CPT

## 2024-04-30 PROCEDURE — 93000 ELECTROCARDIOGRAM COMPLETE: CPT

## 2024-04-30 RX ORDER — APIXABAN 5 MG/1
5 TABLET, FILM COATED ORAL
Refills: 6 | Status: ACTIVE | COMMUNITY
Start: 2023-04-25

## 2024-05-01 NOTE — DISCUSSION/SUMMARY
[FreeTextEntry1] : 78 year old male with symptomatic persistent atrial fibrillation in the setting of hypertension.  Despite being counseled of his increased risk for stroke with his [id number] of 3 (age > 75 and hypertension) he is followed off oral AC.  He is adamant about an impossibility that he would not have asymptomatic AF despite being educated about the data. His MD has advised him of rapid initiation of AC and urgent cardioversion which has been occurring at a frequency of 1x/12 - 24 months.  I reaffirmed the recommendation for continued TE prophylaxis.  He uses and has been compliant with his [employer] device daily to document a rhythm.  We did discuss possible ablation as well as LAAO, however he prefers to continue current strategy.

## 2024-05-01 NOTE — PHYSICAL EXAM
[Well Developed] : well developed [Well Nourished] : well nourished [Normal Conjunctiva] : normal conjunctiva [Normal Venous Pressure] : normal venous pressure [No Murmur] : no murmur [Clear Lung Fields] : clear lung fields [No Respiratory Distress] : no respiratory distress  [Soft] : abdomen soft [Normal Gait] : normal gait [No Edema] : no edema [No Rash] : no rash [Moves all extremities] : moves all extremities [Alert and Oriented] : alert and oriented [de-identified] : irregularly irregular

## 2024-05-01 NOTE — HISTORY OF PRESENT ILLNESS
[FreeTextEntry1] : 77 year old male with past medical history of hypertension, hyperlipidemia, atrial fibrillation  He follows his HR/rhythm with daily Kardia strips. NO complaints. No chest pain. No shortness of breath. No lightheadedness.  No palpitations. He lives his life without limitations.

## 2024-05-01 NOTE — CARDIOLOGY SUMMARY
[de-identified] : Today:  SR [de-identified] : Ramon choudhary 4/7, 8, 9 ... SR. Today AF [de-identified] : Stress echo 3/3/2021, exercise on mariluz protocol for 9 minutes achieving 85 % of MPHR. No evidence \par  of ischemic on EKG/Echo. [de-identified] : 3/3/2021\par  Normal LV systolic function with EF of 55 %.\par  Normal RV size and systolic function.\par  No significant valvular abnormalities.\par  Mild left atrial dilatation

## 2024-05-02 ENCOUNTER — NON-APPOINTMENT (OUTPATIENT)
Age: 79
End: 2024-05-02

## 2024-05-16 ENCOUNTER — APPOINTMENT (OUTPATIENT)
Dept: FAMILY MEDICINE | Facility: CLINIC | Age: 79
End: 2024-05-16

## 2024-07-11 ENCOUNTER — OFFICE (OUTPATIENT)
Dept: URBAN - METROPOLITAN AREA CLINIC 100 | Facility: CLINIC | Age: 79
Setting detail: OPHTHALMOLOGY
End: 2024-07-11
Payer: MEDICARE

## 2024-07-11 DIAGNOSIS — H01.001: ICD-10-CM

## 2024-07-11 DIAGNOSIS — H43.813: ICD-10-CM

## 2024-07-11 DIAGNOSIS — Z96.1: ICD-10-CM

## 2024-07-11 DIAGNOSIS — H02.531: ICD-10-CM

## 2024-07-11 DIAGNOSIS — H01.004: ICD-10-CM

## 2024-07-11 DIAGNOSIS — H35.411: ICD-10-CM

## 2024-07-11 DIAGNOSIS — H02.534: ICD-10-CM

## 2024-07-11 DIAGNOSIS — H16.223: ICD-10-CM

## 2024-07-11 PROCEDURE — 92014 COMPRE OPH EXAM EST PT 1/>: CPT | Performed by: OPHTHALMOLOGY

## 2024-07-11 ASSESSMENT — CONFRONTATIONAL VISUAL FIELD TEST (CVF)
OD_FINDINGS: FULL
OS_FINDINGS: FULL

## 2024-07-11 ASSESSMENT — LID EXAM ASSESSMENTS
OD_BLEPHARITIS: RUL 1+
OS_LID_LAXITY: 1+
OS_BLEPHARITIS: LUL 1+
OD_LID_LAXITY: 1+

## 2024-07-30 ENCOUNTER — OUTPATIENT (OUTPATIENT)
Dept: OUTPATIENT SERVICES | Facility: HOSPITAL | Age: 79
LOS: 1 days | End: 2024-07-30
Payer: MEDICARE

## 2024-07-30 ENCOUNTER — APPOINTMENT (OUTPATIENT)
Dept: ULTRASOUND IMAGING | Facility: CLINIC | Age: 79
End: 2024-07-30
Payer: MEDICARE

## 2024-07-30 DIAGNOSIS — Z98.49 CATARACT EXTRACTION STATUS, UNSPECIFIED EYE: Chronic | ICD-10-CM

## 2024-07-30 DIAGNOSIS — Z90.79 ACQUIRED ABSENCE OF OTHER GENITAL ORGAN(S): Chronic | ICD-10-CM

## 2024-07-30 DIAGNOSIS — N20.0 CALCULUS OF KIDNEY: ICD-10-CM

## 2024-07-30 PROCEDURE — 76770 US EXAM ABDO BACK WALL COMP: CPT

## 2024-07-30 PROCEDURE — 76770 US EXAM ABDO BACK WALL COMP: CPT | Mod: 26

## 2024-09-12 ENCOUNTER — APPOINTMENT (OUTPATIENT)
Dept: FAMILY MEDICINE | Facility: CLINIC | Age: 79
End: 2024-09-12
Payer: MEDICARE

## 2024-09-12 PROCEDURE — G0008: CPT

## 2024-09-12 PROCEDURE — 90662 IIV NO PRSV INCREASED AG IM: CPT

## 2024-09-23 ENCOUNTER — APPOINTMENT (OUTPATIENT)
Dept: FAMILY MEDICINE | Facility: CLINIC | Age: 79
End: 2024-09-23
Payer: MEDICARE

## 2024-09-23 VITALS — DIASTOLIC BLOOD PRESSURE: 70 MMHG | SYSTOLIC BLOOD PRESSURE: 110 MMHG | RESPIRATION RATE: 16 BRPM | HEART RATE: 72 BPM

## 2024-09-23 VITALS
DIASTOLIC BLOOD PRESSURE: 54 MMHG | TEMPERATURE: 97.3 F | HEIGHT: 71 IN | HEART RATE: 74 BPM | OXYGEN SATURATION: 95 % | WEIGHT: 184.38 LBS | SYSTOLIC BLOOD PRESSURE: 110 MMHG | BODY MASS INDEX: 25.81 KG/M2

## 2024-09-23 DIAGNOSIS — J06.9 ACUTE UPPER RESPIRATORY INFECTION, UNSPECIFIED: ICD-10-CM

## 2024-09-23 DIAGNOSIS — E78.00 PURE HYPERCHOLESTEROLEMIA, UNSPECIFIED: ICD-10-CM

## 2024-09-23 DIAGNOSIS — I48.91 UNSPECIFIED ATRIAL FIBRILLATION: ICD-10-CM

## 2024-09-23 PROCEDURE — 99214 OFFICE O/P EST MOD 30 MIN: CPT

## 2024-09-23 PROCEDURE — G2211 COMPLEX E/M VISIT ADD ON: CPT

## 2024-09-23 RX ORDER — AMOXICILLIN 875 MG/1
875 TABLET, FILM COATED ORAL
Qty: 1 | Refills: 0 | Status: ACTIVE | COMMUNITY
Start: 2024-09-23 | End: 1900-01-01

## 2024-09-23 RX ORDER — FLUTICASONE PROPIONATE 50 UG/1
50 SPRAY, METERED NASAL DAILY
Qty: 1 | Refills: 3 | Status: ACTIVE | COMMUNITY
Start: 2024-09-23 | End: 1900-01-01

## 2024-09-23 NOTE — ASSESSMENT
[FreeTextEntry1] : uri flonase  amocil if sx get worse symptomatic  treatment  fluids  aspirin tylenol advill  notify office  if  symptoms gets  worse  or  temp over  102 hld continue atorvastatin   af continue Eliquis prn and metoprolol

## 2024-10-23 ENCOUNTER — NON-APPOINTMENT (OUTPATIENT)
Age: 79
End: 2024-10-23

## 2024-11-26 ENCOUNTER — APPOINTMENT (OUTPATIENT)
Dept: FAMILY MEDICINE | Facility: CLINIC | Age: 79
End: 2024-11-26

## 2025-01-29 ENCOUNTER — APPOINTMENT (OUTPATIENT)
Dept: ORTHOPEDIC SURGERY | Facility: CLINIC | Age: 80
End: 2025-01-29

## 2025-01-29 ENCOUNTER — APPOINTMENT (OUTPATIENT)
Dept: ORTHOPEDIC SURGERY | Facility: CLINIC | Age: 80
End: 2025-01-29
Payer: MEDICARE

## 2025-01-29 VITALS — WEIGHT: 184 LBS | HEIGHT: 71 IN | BODY MASS INDEX: 25.76 KG/M2

## 2025-01-29 DIAGNOSIS — M19.011 PRIMARY OSTEOARTHRITIS, RIGHT SHOULDER: ICD-10-CM

## 2025-01-29 DIAGNOSIS — M75.81 OTHER SHOULDER LESIONS, RIGHT SHOULDER: ICD-10-CM

## 2025-01-29 PROCEDURE — 99214 OFFICE O/P EST MOD 30 MIN: CPT

## 2025-02-13 ENCOUNTER — NON-APPOINTMENT (OUTPATIENT)
Age: 80
End: 2025-02-13

## 2025-02-15 ENCOUNTER — RX RENEWAL (OUTPATIENT)
Age: 80
End: 2025-02-15

## 2025-03-26 ENCOUNTER — OUTPATIENT (OUTPATIENT)
Dept: OUTPATIENT SERVICES | Facility: HOSPITAL | Age: 80
LOS: 1 days | End: 2025-03-26
Payer: MEDICARE

## 2025-03-26 ENCOUNTER — APPOINTMENT (OUTPATIENT)
Dept: ULTRASOUND IMAGING | Facility: CLINIC | Age: 80
End: 2025-03-26
Payer: MEDICARE

## 2025-03-26 DIAGNOSIS — N28.89 OTHER SPECIFIED DISORDERS OF KIDNEY AND URETER: ICD-10-CM

## 2025-03-26 DIAGNOSIS — Z90.79 ACQUIRED ABSENCE OF OTHER GENITAL ORGAN(S): Chronic | ICD-10-CM

## 2025-03-26 DIAGNOSIS — Z98.49 CATARACT EXTRACTION STATUS, UNSPECIFIED EYE: Chronic | ICD-10-CM

## 2025-03-26 PROCEDURE — 76775 US EXAM ABDO BACK WALL LIM: CPT | Mod: 26

## 2025-03-26 PROCEDURE — 76775 US EXAM ABDO BACK WALL LIM: CPT

## 2025-04-22 ENCOUNTER — APPOINTMENT (OUTPATIENT)
Dept: ELECTROPHYSIOLOGY | Facility: CLINIC | Age: 80
End: 2025-04-22
Payer: MEDICARE

## 2025-04-22 ENCOUNTER — NON-APPOINTMENT (OUTPATIENT)
Age: 80
End: 2025-04-22

## 2025-04-22 VITALS
SYSTOLIC BLOOD PRESSURE: 125 MMHG | OXYGEN SATURATION: 96 % | HEART RATE: 62 BPM | HEIGHT: 71 IN | DIASTOLIC BLOOD PRESSURE: 72 MMHG | WEIGHT: 180 LBS | BODY MASS INDEX: 25.2 KG/M2

## 2025-04-22 DIAGNOSIS — I48.91 UNSPECIFIED ATRIAL FIBRILLATION: ICD-10-CM

## 2025-04-22 PROCEDURE — 99215 OFFICE O/P EST HI 40 MIN: CPT

## 2025-04-22 PROCEDURE — 93000 ELECTROCARDIOGRAM COMPLETE: CPT

## 2025-04-28 ENCOUNTER — APPOINTMENT (OUTPATIENT)
Dept: ORTHOPEDIC SURGERY | Facility: CLINIC | Age: 80
End: 2025-04-28
Payer: MEDICARE

## 2025-04-28 ENCOUNTER — RX RENEWAL (OUTPATIENT)
Age: 80
End: 2025-04-28

## 2025-04-28 DIAGNOSIS — M47.816 SPONDYLOSIS W/OUT MYELOPATHY OR RADICULOPATHY, LUMBAR REGION: ICD-10-CM

## 2025-04-28 DIAGNOSIS — M54.50 LOW BACK PAIN, UNSPECIFIED: ICD-10-CM

## 2025-04-28 PROCEDURE — 72100 X-RAY EXAM L-S SPINE 2/3 VWS: CPT

## 2025-04-28 PROCEDURE — 99214 OFFICE O/P EST MOD 30 MIN: CPT

## 2025-05-07 ENCOUNTER — NON-APPOINTMENT (OUTPATIENT)
Age: 80
End: 2025-05-07

## 2025-05-07 ENCOUNTER — APPOINTMENT (OUTPATIENT)
Dept: FAMILY MEDICINE | Facility: CLINIC | Age: 80
End: 2025-05-07
Payer: MEDICARE

## 2025-05-07 ENCOUNTER — LABORATORY RESULT (OUTPATIENT)
Age: 80
End: 2025-05-07

## 2025-05-07 VITALS
HEIGHT: 71 IN | BODY MASS INDEX: 26.18 KG/M2 | TEMPERATURE: 97.6 F | WEIGHT: 187 LBS | OXYGEN SATURATION: 96 % | DIASTOLIC BLOOD PRESSURE: 78 MMHG | SYSTOLIC BLOOD PRESSURE: 126 MMHG | HEART RATE: 63 BPM

## 2025-05-07 VITALS — RESPIRATION RATE: 16 BRPM | DIASTOLIC BLOOD PRESSURE: 80 MMHG | SYSTOLIC BLOOD PRESSURE: 140 MMHG | HEART RATE: 60 BPM

## 2025-05-07 DIAGNOSIS — M54.50 LOW BACK PAIN, UNSPECIFIED: ICD-10-CM

## 2025-05-07 DIAGNOSIS — I48.91 UNSPECIFIED ATRIAL FIBRILLATION: ICD-10-CM

## 2025-05-07 DIAGNOSIS — E78.00 PURE HYPERCHOLESTEROLEMIA, UNSPECIFIED: ICD-10-CM

## 2025-05-07 DIAGNOSIS — E21.3 HYPERPARATHYROIDISM, UNSPECIFIED: ICD-10-CM

## 2025-05-07 PROCEDURE — 99214 OFFICE O/P EST MOD 30 MIN: CPT

## 2025-05-07 PROCEDURE — G2211 COMPLEX E/M VISIT ADD ON: CPT

## 2025-05-08 LAB
ALBUMIN SERPL ELPH-MCNC: 4.5 G/DL
ALP BLD-CCNC: 105 U/L
ALT SERPL-CCNC: 26 U/L
ANION GAP SERPL CALC-SCNC: 11 MMOL/L
AST SERPL-CCNC: 23 U/L
BASOPHILS # BLD AUTO: 0.09 K/UL
BASOPHILS NFR BLD AUTO: 1.5 %
BILIRUB SERPL-MCNC: 0.4 MG/DL
BUN SERPL-MCNC: 29 MG/DL
CALCIUM SERPL-MCNC: 10.5 MG/DL
CALCIUM SERPL-MCNC: 10.5 MG/DL
CHLORIDE SERPL-SCNC: 104 MMOL/L
CHOLEST SERPL-MCNC: 162 MG/DL
CK SERPL-CCNC: 94 U/L
CO2 SERPL-SCNC: 24 MMOL/L
CREAT SERPL-MCNC: 1.25 MG/DL
EGFRCR SERPLBLD CKD-EPI 2021: 58 ML/MIN/1.73M2
EOSINOPHIL # BLD AUTO: 0.57 K/UL
EOSINOPHIL NFR BLD AUTO: 9.3 %
GLUCOSE SERPL-MCNC: 95 MG/DL
HCT VFR BLD CALC: 51.8 %
HDLC SERPL-MCNC: 43 MG/DL
HGB BLD-MCNC: 16.6 G/DL
IMM GRANULOCYTES NFR BLD AUTO: 0.5 %
LDLC SERPL-MCNC: 98 MG/DL
LYMPHOCYTES # BLD AUTO: 1.06 K/UL
LYMPHOCYTES NFR BLD AUTO: 17.4 %
MAN DIFF?: NORMAL
MCHC RBC-ENTMCNC: 30.5 PG
MCHC RBC-ENTMCNC: 32 G/DL
MCV RBC AUTO: 95 FL
MONOCYTES # BLD AUTO: 0.65 K/UL
MONOCYTES NFR BLD AUTO: 10.7 %
NEUTROPHILS # BLD AUTO: 3.7 K/UL
NEUTROPHILS NFR BLD AUTO: 60.6 %
NONHDLC SERPL-MCNC: 118 MG/DL
PARATHYROID HORMONE INTACT: 62 PG/ML
PLATELET # BLD AUTO: 318 K/UL
POTASSIUM SERPL-SCNC: 5.2 MMOL/L
PROT SERPL-MCNC: 7.2 G/DL
RBC # BLD: 5.45 M/UL
RBC # FLD: 15 %
SODIUM SERPL-SCNC: 138 MMOL/L
T4 SERPL-MCNC: 7 UG/DL
TRIGL SERPL-MCNC: 112 MG/DL
TSH SERPL-ACNC: 1.67 UIU/ML
URATE SERPL-MCNC: 5.5 MG/DL
WBC # FLD AUTO: 6.1 K/UL

## 2025-07-17 ENCOUNTER — OFFICE (OUTPATIENT)
Dept: URBAN - METROPOLITAN AREA CLINIC 100 | Facility: CLINIC | Age: 80
Setting detail: OPHTHALMOLOGY
End: 2025-07-17
Payer: MEDICARE

## 2025-07-17 DIAGNOSIS — H02.534: ICD-10-CM

## 2025-07-17 DIAGNOSIS — H35.411: ICD-10-CM

## 2025-07-17 DIAGNOSIS — H01.001: ICD-10-CM

## 2025-07-17 DIAGNOSIS — Z96.1: ICD-10-CM

## 2025-07-17 DIAGNOSIS — H16.223: ICD-10-CM

## 2025-07-17 DIAGNOSIS — H43.813: ICD-10-CM

## 2025-07-17 DIAGNOSIS — H02.531: ICD-10-CM

## 2025-07-17 DIAGNOSIS — H01.004: ICD-10-CM

## 2025-07-17 PROCEDURE — 92014 COMPRE OPH EXAM EST PT 1/>: CPT | Performed by: OPHTHALMOLOGY

## 2025-07-17 ASSESSMENT — REFRACTION_MANIFEST
OD_SPHERE: -0.25
OD_CYLINDER: -0.25
OD_SPHERE: -0.25
OS_AXIS: 155
OS_CYLINDER: SPH
OS_VA1: 20/20
OS_SPHERE: PLANO
OD_ADD: +2.25
OD_AXIS: 005
OD_AXIS: 022
OS_CYLINDER: -0.50
OS_ADD: +2.25
OS_SPHERE: +0.50
OD_CYLINDER: -0.50
OD_VA1: 20/25-2

## 2025-07-17 ASSESSMENT — LID EXAM ASSESSMENTS
OS_BLEPHARITIS: LUL 1+
OD_BLEPHARITIS: RUL 1+
OS_LID_LAXITY: 1+
OD_LID_LAXITY: 1+

## 2025-07-17 ASSESSMENT — REFRACTION_CURRENTRX
OD_OVR_VA: 20/
OS_VPRISM_DIRECTION: SV
OS_SPHERE: +2.00
OD_VPRISM_DIRECTION: SV
OD_SPHERE: +2.00
OS_OVR_VA: 20/

## 2025-07-17 ASSESSMENT — REFRACTION_AUTOREFRACTION
OD_AXIS: 022
OS_AXIS: 155
OS_SPHERE: +0.50
OD_SPHERE: -0.25
OS_CYLINDER: -0.50
OD_CYLINDER: -0.25

## 2025-07-17 ASSESSMENT — KERATOMETRY
OS_K1POWER_DIOPTERS: 42.75
OD_AXISANGLE_DEGREES: 098
OD_K1POWER_DIOPTERS: 42.75
OS_K2POWER_DIOPTERS: 43.50
OS_AXISANGLE_DEGREES: 071
OD_K2POWER_DIOPTERS: 43.50

## 2025-07-17 ASSESSMENT — SUPERFICIAL PUNCTATE KERATITIS (SPK)
OS_SPK: T
OD_SPK: T

## 2025-07-17 ASSESSMENT — TONOMETRY
OD_IOP_MMHG: 12
OS_IOP_MMHG: 13

## 2025-07-17 ASSESSMENT — VISUAL ACUITY
OD_BCVA: 20/20-
OS_BCVA: 20/30+2

## 2025-07-17 ASSESSMENT — CONFRONTATIONAL VISUAL FIELD TEST (CVF)
OD_FINDINGS: FULL
OS_FINDINGS: FULL

## 2025-07-25 ENCOUNTER — APPOINTMENT (OUTPATIENT)
Dept: OTOLARYNGOLOGY | Facility: CLINIC | Age: 80
End: 2025-07-25